# Patient Record
Sex: MALE | Race: WHITE
[De-identification: names, ages, dates, MRNs, and addresses within clinical notes are randomized per-mention and may not be internally consistent; named-entity substitution may affect disease eponyms.]

---

## 2018-04-25 LAB
ANION GAP SERPL CALC-SCNC: 12.7 MMOL/L (ref 8–16)
BUN SERPL-MCNC: 8 MG/DL (ref 7–26)
BUN/CREAT SERPL: 9 (ref 6–25)
CALCIUM SERPL-MCNC: 9.6 MG/DL (ref 8.4–10.2)
CHLORIDE SERPL-SCNC: 102 MMOL/L (ref 98–107)
CO2 SERPL-SCNC: 29 MMOL/L (ref 22–29)
EGFRCR SERPLBLD CKD-EPI 2021: > 60 ML/MIN (ref 60–?)
GLUCOSE SERPLBLD-MCNC: 248 MG/DL (ref 74–118)
POTASSIUM SERPL-SCNC: 4.7 MMOL/L (ref 3.5–5.1)
SODIUM SERPL-SCNC: 139 MMOL/L (ref 136–145)

## 2018-04-26 ENCOUNTER — HOSPITAL ENCOUNTER (OUTPATIENT)
Dept: HOSPITAL 88 - OR | Age: 48
Discharge: HOME | End: 2018-04-26
Attending: SPECIALIST
Payer: COMMERCIAL

## 2018-04-26 DIAGNOSIS — F32.9: ICD-10-CM

## 2018-04-26 DIAGNOSIS — B19.10: ICD-10-CM

## 2018-04-26 DIAGNOSIS — S46.021A: Primary | ICD-10-CM

## 2018-04-26 DIAGNOSIS — M75.21: ICD-10-CM
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
## 2018-04-26 DIAGNOSIS — I10: ICD-10-CM

## 2018-04-26 DIAGNOSIS — Z79.4: ICD-10-CM

## 2018-04-26 DIAGNOSIS — M54.9: ICD-10-CM

## 2018-04-26 DIAGNOSIS — Z01.812: ICD-10-CM

## 2018-04-26 DIAGNOSIS — I49.9: ICD-10-CM

## 2018-04-26 DIAGNOSIS — Z01.810: ICD-10-CM

## 2018-04-26 DIAGNOSIS — N20.0: ICD-10-CM
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
## 2018-04-26 DIAGNOSIS — E11.9: ICD-10-CM

## 2018-04-26 DIAGNOSIS — F41.9: ICD-10-CM

## 2018-04-26 PROCEDURE — 0LQ14ZZ REPAIR RIGHT SHOULDER TENDON, PERCUTANEOUS ENDOSCOPIC APPROACH: ICD-10-PCS | Performed by: SPECIALIST

## 2018-04-26 PROCEDURE — 29827 SHO ARTHRS SRG RT8TR CUF RPR: CPT

## 2018-04-26 PROCEDURE — 93005 ELECTROCARDIOGRAM TRACING: CPT

## 2018-04-26 PROCEDURE — 80048 BASIC METABOLIC PNL TOTAL CA: CPT

## 2018-04-26 PROCEDURE — 36415 COLL VENOUS BLD VENIPUNCTURE: CPT

## 2018-04-26 PROCEDURE — 82948 REAGENT STRIP/BLOOD GLUCOSE: CPT

## 2018-04-26 NOTE — OPERATIVE REPORT
DATE OF PROCEDURE:  April 26, 2018 



ASSISTANT:   Montez Bailey PA-C



The patient was brought to the operating room for induction of anesthesia.  

Throughout this case, my PA's assistance was necessary for retraction of 

soft tissue and positioning of the extremity.  This allows for efficient 

and technically successful execution of the operation and is considered 

medically necessary.



PREOPERATIVE DIAGNOSIS:  Right shoulder rotator cuff tear with biceps 

tendinitis.



PROCEDURE:  Right shoulder arthroscopy subacromial decompression 

compression, release and debridement of biceps tendon, rotator cuff repair.



INDICATIONS:  The patient is a 48-year-old gentleman with a near 30 year 

history of right shoulder pain.  He attributes this to an injury in high 

school  Clinic exam and MRI findings are consistent with a biceps 

tendonitis and a rotator cuff tear.  The findings and options have been 

discussed.  The patient's past history is significant for multiple back and 

neck surgeries and he is on chronic high-dose opioids and is on disability. 

 He understands the added challenges this will present in his postoperative 

recovery and pain management.  He states he understands and wishes to 

proceed.



DESCRIPTION OF PROCEDURE:  The patient was brought to the operating room 

and placed under general anesthetic.  He received a regional block and 

prophylactic antibiotics in the holding area.  He was positioned in the 

beach chair position on the shoulder table.  His right upper extremity was 

prepped and draped in a sterile manner.  A preoperative time out was 

performed.  The shoulder was noted to have full range of motion while 

prepping.  A standard arthroscopy portal was established in the posterior 

soft spot.  The shoulder was insufflated with sterile saline and 

systematically inspected.  The glenohumeral surfaces and labrum were well 

preserved.  The biceps tendon was torn at about 95%.  There was minimal 

fibers exiting the shoulder joint.  This was completely released and 

debrided back down to a stable rim at the supraglenoid tubercle.  The 

supraspinatus and infraspinatus were noted to be completely torn.  The 

scope was placed into the subacromial space.  A lateral working portal had 

been established after needle localization.  A subacromial bursectomy was 

performed with a 4.5 mm electro blade.  A subacromial bone decompression 

was also performed.  This opened up the visibility of the subacromial space 

to allow debridement of the rotator cuff back to more healthy tissue.  The 

greater tuberosity was gently decorticated.  An Arthrex double row speed 

bridge construct was used to repair the tendon down to bleeding cancellous 

bone.  Two bioabsorbable suture anchors pre-loaded with fiber tape were 

placed into a  holes at the articular margin.  There was approximately 

a 2 cm gap between the two suture anchors in the anterior to posterior 

plane.  These were passed through healthy portions of the tendon using an 

Arthrex Scorpion suture passer.  An anterior shuttle portal was 

established.  The fiber tapes were then used to repair the tendon down to 

bleeding cancellous bone using an anterior to posterior and posterior to 

anterior double row construct.  Nice secure fixation was obtained.  The 

arthroscopic instruments were removed.  The portal incisions were closed 

with nylon stitches.  A sterile bandage and an ultra sling was applied.  

The patient was extubated and transported to the recovery room in stable 

condition.



  



DD:  04/26/2018 12:01

DT:  04/26/2018 13:57

Job#:  [id number]

## 2018-05-10 ENCOUNTER — HOSPITAL ENCOUNTER (OUTPATIENT)
Dept: HOSPITAL 88 - RAD | Age: 48
End: 2018-05-10
Attending: UROLOGY
Payer: COMMERCIAL

## 2018-05-10 DIAGNOSIS — N20.0: Primary | ICD-10-CM

## 2018-05-10 PROCEDURE — 74018 RADEX ABDOMEN 1 VIEW: CPT

## 2018-05-10 NOTE — DIAGNOSTIC IMAGING REPORT
PROCEDURE:X-RAY ABDOMEN - KUB

 

COMPARISON:None.

 

INDICATIONS:CALCULUS OF KIDNEY

 

FINDINGS:

There is a non-obstructed bowel-gas pattern. Moderate amount of stool. 

Left upper quadrant surgical clips and bowel sutures. Several punctate 

radiodensity overlying the left renal shadow but adjacent to these 

surgical clips. There are no acute osseous abnormalities. The lung 

bases are clear.

 

CONCLUSION:

Several punctate radiodensity overlying the left renal shadow. However, 

this is adjacent to the surgical clips. This is indeterminate and may 

represent renal stones versus prior postsurgical changes. 

 

Dictated by:  Fawad Heath M.D. on 5/10/2018 at 12:59     

Electronically approved by:  Fawad Heath M.D. on 5/10/2018 at 12:59

## 2018-09-14 ENCOUNTER — HOSPITAL ENCOUNTER (OUTPATIENT)
Dept: HOSPITAL 88 - RAD | Age: 48
End: 2018-09-14
Attending: UROLOGY
Payer: COMMERCIAL

## 2018-09-14 DIAGNOSIS — N20.0: Primary | ICD-10-CM

## 2018-09-14 PROCEDURE — 74018 RADEX ABDOMEN 1 VIEW: CPT

## 2018-09-14 NOTE — DIAGNOSTIC IMAGING REPORT
EXAM: Abdomen 1  Views

INDICATION:     

\S\RENAL CALCULUS

COMPARISON: None



FINDINGS:

The diaphragms and more superior aspect of the abdomen are not included in the

exam.

Mild to moderate amount of stool in the colon. Air-filled loops of bowel

overlying the kidneys.

No dilated loops of small bowel.



No renal calculi. Few tiny radiopacities overlying the right upper quadrant may

represent an artifact.



No abnormal soft tissue masses.



Mild degenerative changes in the lumbar spine and pelvis.



IMPRESSION:

1.  Suboptimal evaluation of the abdomen and pelvis due to overlying bowel gas

and stools.

2.  No abnormal calcifications overlying the visualized renal shadows and

pelvis. If clinical concern for renal stone, consider CT abdomen and pelvis

without contrast renal stone protocol.



Signed by: Dr. Anitra Schmidt M.D. on 9/14/2018 2:30 PM

## 2020-05-16 ENCOUNTER — HOSPITAL ENCOUNTER (EMERGENCY)
Dept: HOSPITAL 88 - ER | Age: 50
LOS: 1 days | Discharge: HOME | End: 2020-05-17
Payer: COMMERCIAL

## 2020-05-16 VITALS — HEIGHT: 71 IN | WEIGHT: 205 LBS | BODY MASS INDEX: 28.7 KG/M2

## 2020-05-16 DIAGNOSIS — Z98.84: ICD-10-CM

## 2020-05-16 DIAGNOSIS — E11.65: ICD-10-CM

## 2020-05-16 DIAGNOSIS — E11.40: ICD-10-CM

## 2020-05-16 DIAGNOSIS — I95.2: Primary | ICD-10-CM

## 2020-05-16 LAB
BASOPHILS # BLD AUTO: 0 10*3/UL (ref 0–0.1)
BASOPHILS NFR BLD AUTO: 0.6 % (ref 0–1)
DEPRECATED NEUTROPHILS # BLD AUTO: 2.3 10*3/UL (ref 2.1–6.9)
EOSINOPHIL # BLD AUTO: 0.1 10*3/UL (ref 0–0.4)
EOSINOPHIL NFR BLD AUTO: 2.7 % (ref 0–6)
ERYTHROCYTE [DISTWIDTH] IN CORD BLOOD: 14.2 % (ref 11.7–14.4)
HCT VFR BLD AUTO: 34.5 % (ref 38.2–49.6)
HGB BLD-MCNC: 11.1 G/DL (ref 14–18)
LYMPHOCYTES # BLD: 2.4 10*3/UL (ref 1–3.2)
LYMPHOCYTES NFR BLD AUTO: 45.2 % (ref 18–39.1)
MCH RBC QN AUTO: 27 PG (ref 28–32)
MCHC RBC AUTO-ENTMCNC: 32.2 G/DL (ref 31–35)
MCV RBC AUTO: 83.9 FL (ref 81–99)
MONOCYTES # BLD AUTO: 0.4 10*3/UL (ref 0.2–0.8)
MONOCYTES NFR BLD AUTO: 6.7 % (ref 4.4–11.3)
NEUTS SEG NFR BLD AUTO: 44.6 % (ref 38.7–80)
PLATELET # BLD AUTO: 192 X10E3/UL (ref 140–360)
RBC # BLD AUTO: 4.11 X10E6/UL (ref 4.3–5.7)

## 2020-05-16 PROCEDURE — 81001 URINALYSIS AUTO W/SCOPE: CPT

## 2020-05-16 PROCEDURE — 99283 EMERGENCY DEPT VISIT LOW MDM: CPT

## 2020-05-16 PROCEDURE — 80307 DRUG TEST PRSMV CHEM ANLYZR: CPT

## 2020-05-16 PROCEDURE — 80053 COMPREHEN METABOLIC PANEL: CPT

## 2020-05-16 PROCEDURE — 85025 COMPLETE CBC W/AUTO DIFF WBC: CPT

## 2020-05-16 PROCEDURE — 82553 CREATINE MB FRACTION: CPT

## 2020-05-16 PROCEDURE — 93005 ELECTROCARDIOGRAM TRACING: CPT

## 2020-05-16 PROCEDURE — 84484 ASSAY OF TROPONIN QUANT: CPT

## 2020-05-16 PROCEDURE — 82550 ASSAY OF CK (CPK): CPT

## 2020-05-16 PROCEDURE — 36415 COLL VENOUS BLD VENIPUNCTURE: CPT

## 2020-05-16 PROCEDURE — 71045 X-RAY EXAM CHEST 1 VIEW: CPT

## 2020-05-16 NOTE — XMS REPORT
Continuity of Care Document

                             Created on: 2020



SHANNA CRUMP JR

External Reference #: [id number]

: 1970

Sex: Male



Demographics





                          Address                   [address] [place] [address] [id number]

[place]  [address]

 

                          Home Phone                [phone]

 

                          Preferred Language        English

 

                          Marital Status            Unknown

 

                          Protestant Affiliation     Unknown

 

                          Race                      Unknown

 

                          Ethnic Group              Unknown





Author





                          Author                    Diane Watters [employer]

 SHANNA Mcclain              [employer]

 Exchange

 

                          Address                   Unknown

 

                          Phone                     Unavailable







Care Team Providers





                    Care Team Member Name Role                Phone

 

                    [facility] [employer] Unavailable         Un

available



                                    



Problems

                    



                    Problem                         Status                      

   Onset Date       

                          Classification                         Date Reported  

         

                          Comments                         Source               

     

 

                          STENOSIS, SPONYLOSIS, DISC DEGENERATION               

          Active          

                    2017                                                  

      

                                                    [facility]     

         

     

 

                          LUM STENOSIS, LUM SPONDYLOSIS, LUM DISC               

          Active          

                    2015                                                  

      

                                                    [facility]     

         

     

 

                    Anxiety (finding)                         Active            

                    

                          Problem                         06/15/2019            

         

                                                    [facility]

[facility] [place]            

       

 

                    Backache (finding)                         Active           

                    

                          Problem                         06/15/2019            

        

                                                    [facility]

[facility] [place]           

        

 

                          Diabetes mellitus (disorder)                         A

ctive                     

                                             Problem                         06/

15/2019          

                                                    [facility]

[facility] [place] 

                  

 

                          Depressive disorder (disorder)                        

 Active                   

                                             Problem                         06/

15/2019        

                                                    [facility]

PID 

[place]                    

 

                          Obstructive sleep apnea syndrome (disorder)           

              Active      

                                             Problem                         

06/15/2019                                                   [facility] [place]                    

 

                    Pain (finding)                         Active               

                    

                          Problem                         06/15/2019            

            

                                                    [facility]

[facility] [place]               

    

 

                          Spinal stenosis in cervical region (disorder)         

                Active    

                                             Problem                         

06/15/2019                                                   [facility] [place]                    

 

                    Final:                                                      

                    

                                             2015                         

                    

                                        [facility]                 

   

 

                    SPIN STEN,LUMBR W YAAKOV                         Active      

                    

                                                                                

                                        [facility]                 

   

 

                    LUMBOSACRAL SPONDYLOSIS                         Active      

                    

                                                                                

                                        [facility]                 

   



                                                                                
                                                                                
                                                                                
     



Medications

                    



                    Medication                         Details                  

       Route        

                          Status                         Patient Instructions   

          

                          Ordering Provider                         Order Date  

               

                                        Source                    

 

                          Lisinopril                         Notes: (Same as: Pr

inivil, Zestril)          

                                             No Longer Active                   

      

                                                    2017                  

   

                                        [facility]                 

   

 

                          lisinopril 20 mg oral tablet                         2

0 mg = 1 tab, PO, Daily, 0

Refill(s)                                                   Active              

 

                                                                      2017

           

                                        [facility]                 

   

 

                                        pneumococcal capsular polysaccharide typ

e 1 vaccine / pneumococcal capsular 

polysaccharide type 10A vaccine / pneumococcal capsular polysaccharide type 11A 
vaccine / pneumococcal capsular polysaccharide type 12F vaccine / pneumococcal 
capsular polysacchar                         Notes: (Same as: Pneumovax 23)  

Refrigerate                                                   Inactive          

 

                                                                      2017

       

                                        [facility]                 

   

 

                          Magnesium Oxide                         Notes: (Same a

s: Mag-Ox 400) Magnesium 

oxide [id number] elemental magnesium Dose=____mg magnesium oxide (___mg 
elemental magnesium)                                                   No Longer

 

Active                                                                          

 

                          2017                         [facility]

nter                    

 

                                        NPH Insulin, Human 70 UNT/ML / Regular I

nsulin, Human 30 UNT/ML Injectable 

Suspension                              Notes: Roll in palms of hands gently; Do

 not 

shake. (Same as: NovoLIN Mix 70/30) Do not hold insulin without contacting 
prescriber  WASTE: F/P - Black; E - Municipal Trash Bin                         

                          No Longer Active                                      

  

                                             2017                         

[facility]                    

 

                          Cefazolin                         Notes: (Same As: Anc

ef, Kefzol)    *** 

MEDICATION WASTE *** Product Size:  1000 mg Product Wasted:  ___ mg             
                                             No Longer Active                   

         

                                                    2017                  

      

                                        [facility]                 

   

 

                          Insulin regular                           60 units)  W

ASTE: F/P - Black; E - 

Municipal Trash Bin  Stable for 28 days at room temperature Expires in ______ 
days from ______________Date                                                   N

o

Longer Active                                                                   

 

                          2017                         [facility]

nter               

    

 

                          Glucagon                         1 mg, Route: IM, Drug

 form: PDR/INJ, PRN, 

Dosing Weight 97.727, kg, PRN Blood Glucose Results, Start date: 17 
16:02:00 CDT, Duration: 30 day, Stop date: 17 16:01:00 CDT                
                                             No Longer Active                   

            

                                             2017                         

[facility]                    

 

                          Dextrose 50% Syringe                         25 gm, 50

 mL, Route: IVP, Drug 

Form: INJ, Dosing Weight 97.727, kg, PRN, PRN Blood Glucose Results, Start date:
17 16:02:00 CDT, Duration: 30 day, Stop date: 17 16:01:00 CDT       
                                                    No Longer Active            

          

                                                                      2017

                  

                                        [facility]                 

   

 

                          gabapentin                         Notes: (Same as: Ne

urontin)                  

                                             No Longer Active                   

              

                                             2017                         

[facility]                    

 

                          Acetaminophen                         Notes: Max aceta

minophen 4000 mg/day (4 

gm/day).  (Same as: Tylenol Extra Strength)                                     

                    No Longer Active                                            

        

                          2017                         [facility]

nter

                   

 

                          Oxycontin                         Notes: Do not crush 

or chew. (Same as: 

OxyContin)                                                   No Longer Active   

 

                                                                      2017

                                        [facility]                 

   

 

                          Oxycodone Hydrochloride 5 MG Oral Tablet              

           Notes: (Same 

as: Roxicodone)                                                   No Longer 

Active                                                                          

 

                          2017                         [facility]

nt                    

 

                          ketOROLAC 30 mg/mL injectable solution                

          4 days.         

                                                    No Longer Active            

            

                                                                      2017

                    

                                        [facility]                 

   

 

                                        24 HR tramadol hydrochloride 100 MG Exte

nded Release Tablet                     

                          Notes: Not to exceed 400mg/day. (Same As: Ultram)     

                       

                     Inactive                                                   

                          2017                         [facility]                    

 

                          tramadol hydrochloride 50 MG Oral Tablet              

           50 mg, Route: 

PO, Drug form: TAB, Q6H, Dosing Weight 97.727, kg, PRN Pain Score 1-3, Start 
date: 17 11:01:00 CDT, Duration: 30 day, Stop date: 17 11:00:00 CDT 
                                                    Inactive                    

    

                                                                      2017

                    

                                        [facility]                 

   

 

                          Hydromorphone                         Notes: Same as: 

Dilaudid                  

                                             Inactive                           

              

                                             2017                         

[facility]                    

 

                          Oxycodone                         Notes: (Same as: Winifred

icodone)                  

                                             Inactive                           

              

                                             2017                         

[facility]                    

 

                          Ondansetron                         Notes: (Same as: JEFE berry)   *** MEDICATION 

WASTE *** Product Size:  4 mg Product Wasted:  ___ mg                           

                      Inactive                                                  

                          2017                         [facility]                    

 

                          neostigmine (ANES)                         Route: IV, 

Drug form: INJ, ONCE, Stop

date: 17 10:53:00 CDT                                                   

Inactive                                                                        

 

                          2017                         [facility]

nter                    

 

                          glycopyrrolate (ANES)                         Route: I

V, Drug form: INJ, ONCE, 

Stop date: 17 10:53:00 CDT                                                

                    Inactive                                                    

                   

                          2017                         [facility]

nter                   



 

                          ondansetron (ANES)                         Route: IV, 

Drug form: INJ, ONCE, Stop

date: 17 10:53:00 CDT                                                   

Inactive                                                                        

 

                          2017                         [facility]

nter                    

 

                    ketOROLAC (ANES)                         IV, ONCE           

                    

                    Inactive                                                    

  

                          2017                         [facility]

nter  

                 

 

                          LR 1000 mL INJ (ANES)                         Route: I

V, Total Volume: 1,000, 

Start date: 17 10:08:00 CDT, Stop date: 17 11:08:00 CDT             
                                             Inactive                           

         

                                             2017                         

[facility]                    

 

                          phenylephrine (ANES)                         Route: IV

, Drug form: INJ, ONCE, 

Stop date: 17 10:07:00 CDT                                                

                    Inactive                                                    

                   

                          2017                         [facility]

nter                   



 

                          dexamethasone (ANES)                         Route: IV

, Drug form: INJ, ONCE, 

Stop date: 17 9:57:00 CDT                                                 

                    Inactive                                                    

                    

                          2017                         [facility]

nter                    

 

                          rocuronium (ANES)                         Route: IV, D

rug form: INJ, ONCE, Stop 

date: 17 9:42:00 CDT                                                   

Inactive                                                                        

 

                          2017                         [facility]

nter                    

 

                          lidocaine (ANES)                         Route: IV, Dr

ug form: INJ, ONCE, Stop 

date: 17 9:42:00 CDT                                                   

Inactive                                                                        

 

                          2017                         [facility]

nter                    

 

                          propofol (ANES)                         Route: IV, Matty

g form: INJ, ONCE, Stop 

date: 17 9:42:00 CDT                                                   

Inactive                                                                        

 

                          2017                         [facility]

nt                    

 

                          fentaNYL (ANES)                         Route: IV, Matty

g form: INJ, ONCE, Stop 

date: 17 9:42:00 CDT                                                   

Inactive                                                                        

 

                          2017                         [facility]

nt                    

 

                          ceFAZolin (ANES)                         Route: IV, Dr

ug form: INJ, ONCE, Stop 

date: 17 9:42:00 CDT                                                   

Inactive                                                                        

 

                          2017                         [facility]

nter                    

 

                          midazolam (ANES)                         Route: IV, Dr

ug form: SOLN, ONCE, Stop 

date: 17 9:37:00 CDT                                                   

Inactive                                                                        

 

                          2017                         [facility]

nter                    

 

                          Protonix                         Notes: For IV push re

constitute with 10 ml 0.9%

sodium chloride and push over 2 minutes. (Same as: Protonix)                    
                                             No Longer Active                   

                

                                             2017                         

[facility]                    

 

                          SUFentanil (ANES) (ANES)                         Route

: IV, Drug form: INJ, 

Start date: 17 9:00:00 CDT, Stop date: 17 10:00:00 CDT              
                                             Inactive                           

          

                                             2017                         

[facility]                    

 

                                        sodium chloride 0.9% 100 ml INJ (ANES) +

 ketAMINE (ANES) (ANES)                 

                                        Route: IV, Drug form: INJ, Start date: 17 9:00:00 CDT, Stop date: 

17 10:00:00 CDT                                                   Inactive

 

                                                                         

2017                              [facility]                 

   

 

                                        sodium chloride 0.9% 100 ml INJ (ANES) +

 magnesium sulfate (ANES) (ANES)        

                                        Route: IV, Drug form: INJ, Start date: 17 9:00:00 CDT, 

Stop date: 17 10:00:00 CDT                                                

                    Inactive                                                    

                   

                          2017                         [facility]

nter                   



 

                                        sodium chloride 0.9% 100 ml INJ (ANES) +

 lidocaine (ANES) (ANES)                

                                        Route: IV, Drug form: INJ, Start date: 17 9:00:00 CDT, Stop date: 

17 10:00:00 CDT                                                   Inactive

 

                                                                         

2017                              [facility]                 

   

 

                          Oxycontin                         40 mg, 2 tab, Route:

 PO, Drug form: ERTAB, 

Q12H, Dosing Weight 97.727, kg, Start date: 17 9:00:00 CDT, Duration: 30 
day, Stop date: 17 21:00:00 CDT                                           

                    Inactive                                                    

              

                          2017                         [facility]

nter              

     

 

                          Dexamethasone                         Notes: Concentra

tion: 4mg/ml              

                                             No Longer Active                   

          

                                             2017                         

[facility]                    

 

                          Docusate Sodium 100 MG Oral Capsule [Colace]          

               Notes: 

(Same as: Colace) (Do Not Crush)                                                

                    No Longer Active                                            

                  

                          2017                         [facility]

nter         

           

 

                          Metformin hydrochloride 500 MG Oral Tablet            

             Notes: (Same 

as: Glucophage)  Take with meal                                                 

                    No Longer Active                                            

                   

                          2017                         [facility]

nter          

          

 

                          ReliOn/NovoLIN 70/30                         30 unit, 

Route: SUB-Q, Daily, 

Dosing Weight 97.727, kg, Start date: 17 9:00:00 CDT, Duration: 30 day, 
Stop date: 17 9:00:00 CDT                                                 

                    Inactive                                                    

                   

                          2017                         [facility]

nter                  

  

 

                          gabapentin 600 MG Oral Tablet                         

Notes: (Same as: 

Neurontin)                                                   Inactive           

 

                                                                      2017

       

                                        [facility]                 

   

 

                          Escitalopram                         Notes: (Same as: 

Lexapro)                  

                                             No Longer Active                   

             

                                             2017                         

[facility]                    

 

                          Zofran                         Notes: (Same as: Zofran

)   *** MEDICATION WASTE 

*** Product Size:  4 mg Product Wasted:  ___ mg                                 

                          No Longer Active                                      

         

                                             2017                         

[facility]                    

 

                          tramadol hydrochloride 50 MG Oral Tablet              

           Notes: Not to 

exceed 400mg/day. (Same As: Ultram)                                             

                    No Longer Active                                            

               

                          2017                         [facility] [place] Ce

nter      

              

 

                          tizanidine                         Notes: (Same As: Za

naflex)                   

                                             No Longer Active                   

              

                                             2017                         

[facility]                    

 

                          sennosides, USP                         Notes: (Same a

s: Senokot)               

                                             No Longer Active                   

          

                                                    2017                  

      

                                        [facility]                 

   

 

                          Phenergan                         Notes: Do not give I

V push.  (Same as: 

Phenergan)                                                   No Longer Active   

 

                                                                       

2017                              [facility]                 

   

 

                          sodium chloride 0.9% 1000 ml INJ 1,000 mL             

            1,000 mL, 

Rate: 75 ml/hr, Infuse over: 13.3 hr, Route: IV, Dosing Weight 97.727 kg, Total 
Volume: 1,000, Start date: 17 8:42:00 CDT, Duration: 30 day, Stop date: 
17 8:41:00 CDT                                                   No Longer

 

Active                                                                          

 

                          2017                         [facility] [place] [place] Ce

nter                    

 

                          Morphine                         Notes: (Same as:MORPh

ine Sulfate)              

                                             Inactive                           

         

                                             2017                         

[facility]                    

 

                          Diphenhydramine                         Notes: (Same a

s: Benadryl)              

                                             No Longer Active                   

         

                                                    2017                  

     

                                        [facility]                 

   

 

                          cyclobenzaprine                         Notes: (Same A

s: Flexeril)              

                                             Inactive                           

         

                                             2017                         

[facility]                    

 

                                        Benzocaine 15 MG / Menthol 3.6 MG Lozeng

e [Cepacol Sore Throat Pain Relief 

15/3.6]                                 Notes: Cepacol lozenges  Dispense 1 box 

= 16 

lozenges  (Same As: Cepacol Lozenges)                                           

                    No Longer Active                                            

              

                          2017                         [facility] [place] Ce

nter      

             

 

                    Ambien                         Notes: (Same As: Ambien)     

                    

                          No Longer Active                                      

  

                                             2017                         

[facility]                    

 

                          Lorazepam                         Notes: (Same as: Christopher villegas)                      

                                             No Longer Active                   

                  

                                             2017                         

[facility]                    

 

                          ReliOn/NovoLIN R                         10 unit, Rout

e: SUB-Q, Drug form: SOLN,

PRN, Dosing Weight 97.727, kg, PRN Blood Glucose Results, Start date: 17 
8:39:00 CDT, Duration: 30 day, Stop date: 17 8:38:00 CDT                  
                                             Inactive                           

              

                                             2017                         

[facility]                    

 

                          Insulin regular                         6 unit, Route:

 SUB-Q, ONCE, Dosing 

Weight 97.727, kg, Start date: 17 8:01:00 CDT, Stop date: 17 8:01:00
CDT                                                   Inactive                  

 

                                                                      2017

               

                                        [facility]                 

   

 

                                        Acetaminophen 325 MG / Hydrocodone Yesenia

trate 10 MG Oral Tablet [Norco 10/325]  

                                        Notes: Do not exceed 4gm/day of acetamin

ophen.  (Same as: 

Norco 325/10)                                                   Inactive        

 

                                                                      2017

     

                                        [facility]                 

   

 

                          gabapentin 300 MG Oral Capsule                        

 Notes: (Same as: 

Neurontin)                                                   Inactive           

 

                                                                      2017

        

                                        [facility]                 

   

 

                          tizanidine 4 mg oral capsule                         4

 mg = 1 cap, PO, TID, As 

needed                                                   Active                 

 

                                                                      2017

              

                                        [facility]                 

   

 

                          Metformin hydrochloride 500 MG Oral Tablet            

             500 mg = 1 

tab, PO, BID                                                   Active           

 

                                                                      2017

        

                                        [facility]                 

   

 

                          gabapentin 600 MG Oral Tablet                         

600 mg = 1 tab, PO, BID   

                                              Active                            

                                                    2017                  

      

                                        [facility]                 

   

 

                          Lorazepam 1 MG Oral Tablet                         1 m

g = 1 tab, PO, PRN        

                                             Active                             

    

                                             2017                         

[facility]                    

 

                                        12 HR Oxycodone Hydrochloride 40 MG Exte

nded Release Tablet [Oxycontin]         

                          40 mg = 1 tab, PO, Q12H                               

          

                    Active                                                      

           

                          2017                         [facility] Ce

nter            

        

 

                          Diclofenac Sodium 0.01 MG/MG Topical Gel              

           TOP            

                                             No Longer Active                   

       

                                                    2017                  

   

                                        [facility]                 

   

 

                                        Regular Insulin, Human 100 UNT/ML Inject

able Solution [Novolin R]               

                    10 unit, SUB-Q, PRN                                         

         No

Longer Active                                                                   

 

                          2017                         [facility] Ce

nter               

    

 

                          ReliOn/NovoLIN 70/30                         30 unit, 

SUB-Q, Daily              

                                             No Longer Active                   

          

                                             2017                         

[facility]                    

 

                          escitalopram 20 mg oral tablet                        

 20 mg = 1 tab, PO, Daily 

                                                Active                          

                                                    2017                  

    

                                        [facility]                 

   

 

                          Lidocaine Hydrochloride 0.03 MG/MG Topical Gel        

                 1 appl, 

TOP                                                   No Longer Active          

 

                                                                      2017

       

                                        [facility]                 

   

 

                          Oxycontin                         Notes: (Same as: Oxy

Contin)                   

                                             No Longer Active                   

               

                                             2015                         

[facility]                    

 

                          Dexamethasone                         Notes: Concentra

tion: 4mg/ml              

                                             No Longer Active                   

          

                                             2015                         

[facility]                    

 

                          Vitamin B-12 500 mcg oral tablet                      

   500 microgram = 1 tab, 

PO, Daily                                                   Active              

 

                                                                      2015

           

                                        [facility]                 

   

 

                          Calcium 600 +D oral tablet                         1 t

ab, PO, Daily             

                                             Active                             

         

                                             2015                         

[facility]                    

 

                    multivitamin                         1 tab, PO, Daily       

                    

                      Active                                                    

                          2015                         [facility] [place] [place] Ce

nter

                   

 

                          ferrous sulfate 325 MG Oral Tablet                    

     325 mg = 1 tab, PO, 

Daily                                                   Active                  

 

                                                                      2015

               

                                        [facility]                 

   

 

                          lisinopril 2.5 mg oral tablet                         

2.5 mg = 1 tab, PO, Daily,

# 30 tab                                                   Active               

 

                                                                      2015

            

                                        [facility]                 

   

 

                          NovoLIN 70/30                         20 - 30 units, S

UB-Q, TID-Before Meals    

                                             Active                             

                                             2015                         

[facility]                    

 

                          ceFAZolin (SCIP) + Sodium Chloride 0.9%  mL     

                    Notes:

(Same As: Ancef, Kefzol)  Cefazolin **FOR IV SET ONLY**   *** MEDICATION WASTE 
*** Product Size:  1000 mg Product Wasted:  ___ mg                              

                          No Longer Active                                      

       

                                             2015                         

[facility]                    

 

                          Ofirmev                         Notes: Infuse over 15 

minutes  Do not exceed 

4gm/day of acetaminophen    *** MEDICATION WASTE *** Product Size:  1000 mg 
Product Wasted:  _0__ mg                                                   No 

Longer Active                                                                   

 

                          2015                         [facility]

nter               

    

 

                          ketOROLAC 30 mg/mL injectable solution                

          4 days   *** 

MEDICATION WASTE *** Product Size:  30 mg Product Wasted:  __0_ mg              
                                             No Longer Active                   

          

                                             2015                         

[facility]                    

 

                          Docusate Sodium 100 MG Oral Capsule [Colace]          

               Notes: 

(Same as: Colace) (Do Not Crush)                                                

                    No Longer Active                                            

                  

                          2015                         [facility]

nter         

           

 

                          benzocaine-menthol topical                         Not

es: Cepacol lozenges  

Dispense 1 box = 16 lozenges  (Same As: Cepacol Lozenges)                       
                                             No Longer Active                   

                  

                                             2015                         

[facility]                    

 

                          Ondansetron                         Notes: (Same as: JEFE berry)   *** MEDICATION 

WASTE *** Product Size:  4 mg Product Wasted:  ___ mg                           

                          Inactive                                              

   

                                             2015                         

[facility]                    

 

                          Naloxone                         Notes: (Same as: Narc

an)                       

                                             Inactive                           

                  

                                             2015                         

[facility]                    

 

                          Flumazenil                         Notes: (Same as: Ro

mazicon)                  

                                             Inactive                           

             

                                             2015                         

[facility]                    

 

                          Meperidine                         Notes: (Same as: Kevin thompson)  "Use Precaution in

 Elderly, Seizure disorders, and Renal impairment"                              

                    Inactive                                                    

                          2015                         [facility]                    

 

                          Hydromorphone                         Notes: Same as: 

Dilaudid                  

                                             Inactive                           

             

                                             2015                         

[facility]                    

 

                          Labetalol                         10 mg, 2 mL, Route: 

IVP, Drug form: INJ, 

Q5Min, Dosing Weight 88.636, kg, PRN Elevated BP, Start date: 04/16/15 13:56:00,
 Duration: 5 doses or times, Stop date: Limited # of times                      
                                             Inactive                           

                 

                                             2015                         

[facility]                    

 

                          Insulin regular                           60 units)  S

table for 28 days at room 

temperature Expires in ______ days from ______________Date                      
                                             No Longer Active                   

                 

                                             2015                         

[facility]                    

 

                          Dextrose 50% Syringe                         12.5 gm, 

25 mL, Route: IVP, Drug 

Form: INJ, Dosing Weight 88.636, kg, PRN, PRN Blood Glucose Results, Start date:
 04/16/15 13:20:00, Duration: 30 day, Stop date: 05/16/15 13:19:00              
                                             No Longer Active                   

         

                                                    2015                  

     

                                        [facility]                 

   

 

                          Glucagon                         1 mg, Route: IM, Drug

 form: PDR/INJ, PRN, 

Dosing Weight 88.636, kg, PRN Blood Glucose Results, Start date: 04/16/15 
13:20:00, Duration: 30 day, Stop date: 05/16/15 13:19:00                        
                                             No Longer Active                   

                   

                                             2015                         

[facility]                    

 

                          Dilaudid                         Notes: Same as: Dilau

did                       

                                             No Longer Active                   

                  

                                             2015                         

[facility]                    

 

                          Dilaudid                         Notes: Same as: Dilau

did                       

                                             No Longer Active                   

                  

                                             2015                         

[facility]                    

 

                          Oxycodone Hydrochloride 5 MG Oral Tablet              

           Notes: (Same 

as: Roxicodone)                                                   No Longer 

Active                                                                          

 

                          2015                         [facility]

nter                    

 

                          Zofran                         Notes: (Same as: Zofran

)   *** MEDICATION WASTE 

*** Product Size:  4 mg Product Wasted:  ___ mg                                 

                          No Longer Active                                      

         

                                             2015                         

[facility]                    

 

                          normal saline 0.9% IV 1,000 mL                        

 1,000 mL, Rate: 75 ml/hr,

 Infuse over: 13.3 hr, Route: IV, Dosing Weight 88.636 kg, Total Volume: 1,000, 
Start date: 04/16/15 13:15:00, Duration: 30 day, Stop date: 05/16/15 13:14:00   
                                                    No Longer Active            

     

                                                                      2015

            

                                        [facility]                 

   

 

                          Phenergan                         Notes: Do not give I

V push.  (Same as: 

Phenergan)                                                   No Longer Active   

 

                                                                       

2015                              [facility]                 

   

 

                    Ambien                         Notes: (Same As: Ambien)     

                    

                          No Longer Active                                      

 

                                             2015                         

[facility]                    

 

                          Cepacol Lozenge                         1 lozenge, Rou

te: MUCOUS MEM, Q2H, Drug 

form: ALEXIS, PRN Sore Throat, Start date: 04/16/15 13:15:00, Duration: 30 day, 
Stop date: 05/16/15 13:14:00                                                   

Inactive                                                                        

 

                          2015                         [facility]

nter                   

 

 

                          Ancef                         2 gm, Route: IVPB, Drug 

form: INJ, ONCE, Dosing 

Weight 88.636, kg, Start date: 04/16/15 13:12:00, Duration: 1 doses or times, 
Stop date: 04/16/15 13:12:00                                                   

Inactive                                                                        

 

                          2015                         [facility]

nter                   

 

 

                          ceFAZolin                         2 gm, 50 mL, Route: 

IVPB, Drug form: INJ, PRE 

OP, Start date: 04/16/15 7:00:00, Duration: 1 day, Stop date: 04/17/15 6:59:00  
                                                    No Longer Active            

    

                                                                      2015

           

                                        [facility]                 

   

 

                          NovoLIN 70/30                         SUB-Q, TID-Befor

e Meals, 0 Refill(s)      

                                                    No Longer Active            

        

                                                                      2015

               

                                        [facility]                 

   

 

                                        Acetaminophen 325 MG / Hydrocodone Yesenia

trate 10 MG Oral Tablet [Norco 10/325]  

                                        1-2 tab, PO, Q4-6H, PRN Pain, # 30 tab, 

0 Refill(s)       

                                             Active                             

   

                                             2015                         

[facility]                    

 

                          Flexeril                         5 mg, PO, TID, PRN Mu

scle Spasm, # 30 tab, 0 

Refill(s)                                                   Active              

 

                                                                      2015

           

                                        [facility]                 

   



                                                                                
                                                                                
                                                                                
                                                                                
                                                                                
                                                                                
                                                                                
                                                                                
                                                                                
                                                                                
                                                                                
                                                                                
                                                                                
                                                                                
                                                                                
                                                                                
                                                                                
                                                                                
                                                                                
                                                                                
                                                                                
                                               



Allergies, Adverse Reactions, Alerts

                    



                    Substance                         Category                  

       Reaction     

                          Severity                         Reaction type        

       

                    Status                         Date Reported                

         

Comments                                Source                    

 

                          No Known Medication Allergies                         

Assertion                 

                                                                      Drug aller

gy           

                                                                                

       

                                         [facility]                    



                                                        



Immunizations

                    



                    Immunization                         Date Given             

            Site    

                          Status                         Last Updated           

      

                          Comments                         Source               

     

 

                          pneumococcal 23-valent vaccine                        

 2017               

                    Left deltoid                         completed              

           

[place]                                                   [facility], [facility]                    



                                            



Results





                    Order Name                         Results                  

       Value        

                          Reference Range                         Date          

          

                    Interpretation                         Comments             

            

Source                    

 

                CHEM PANEL                         Calcium Lvl     6.0          

               8.5 -

10.5                         2017                                         

                                        Result Comment: Critical Result(s) cabral

d to Suzanneapril Ruth at 2017 

06:05 byJw.  Read back OK.                         [facility]      

             

 

                CHEM PANEL                         eGFR            137          

                           

                    2017                                                  

Result 

Comment: The eGFR is calculated using the CKD-EPI formula. In most young, 
healthy individuals the eGFR will be >90 mL/min/1.73m2. The eGFR declines with 
age. An eGFR of 60-89 may be normal in some populations, particularly the 
elderly, for whom the CKD-EPI formula has not been extensively validated. Use of
the eGFR is not recommended in the following populations:<br/><br/>Individuals 
with unstable creatinine concentrations, including pregnant patients and those 
with serious co-morbid conditions.<br/><br/>Patients with extremes in muscle 
mass or diet. <br/><br/>The data above are obtained from the National Kidney 
Disease Education Program (NKDEP) which additionally recommends that when the 
eGFR is used in patients with extremes of body mass index for purposes of drug 
dosing, the eGFR should be multiplied by the estimated BMI.                     
                                        [facility]                 

   

 

                CHEM PANEL                         BUN             12           

              7 - 22        

                    2017                                                  

    

                                        [facility]                 

   

 

                CHEM PANEL                         Glucose Lvl     205          

               70 - 

99                         2017                                           

                                                    [facility]     

               

 

                CHEM PANEL                         Creatinine Lvl  0.43         

                

0.50 - 1.40                         2017                                  

                                                    [facility]     

            

  

 

                CHEM PANEL                         Sodium Lvl      145          

               135 - 

145                         2017                                          

                                                    [facility]     

               

 

                CHEM PANEL                         Potassium Lvl   3.7          

               3.5

- 5.1                         2017                                        

                                                    [facility]     

               

 

                CHEM PANEL                         Chloride Lvl    118          

               95 -

109                         2017                                          

                                                    [facility]     

               

 

                CHEM PANEL                         CO2             21           

              24 - 32       

                    2017                                                  

   

                                        [facility]                 

   

 

                CHEM PANEL                         AGAP            9.7          

               10.0 - 20.0 

                          2017                                            

   

                                                    [facility]     

               

 

                HEMATOLOGY                         Hct             23.8         

                42.0 - 54.0 

                          2017                                            

   

                                                    [facility]     

               

 

                HEMATOLOGY                         Hgb             8.2          

               14.0 - 18.0  

                          2017                                            

    

                                                    [facility]     

               

 

                LIPIDS                         VLDL            17               

                           

                    2017                                                  

             

                                        [facility]                 

   

 

                LIPIDS                         HDL             35               

          >=61 mg/dL        

                    2017                                                  

    

                                        [facility]                 

   

 

                LIPIDS                         Trig            86               

          <=149 mg/dL      

                    2017                                                  

  

                                        [facility]                 

   

 

                LIPIDS                         Chol            118              

           <=199 mg/dL     

                    2017                                                  

 

                                        [facility]                 

   

 

                LIPIDS                         CHD Risk        3.37             

            4.00 - 7.30

                          2017                                            

  

                                                    [facility]     

               

 

                LIPIDS                         LDL (Calculated) 66              

           <=99 

mg/dL                         2017                                        

                                                    [facility]     

               

 

                CHEM PANEL                         Magnesium Lvl   2.1          

               1.8

- 2.4                         2017                                        

                                                    [facility]     

               

 

                ELECTROLYTES                         AGAP            10.1       

                  10.0 - 

20.0                         2017                                         

                                                    [facility]     

               

 

                ELECTROLYTES                         eGFR            109        

                           

                    2017                                                  

Result

Comment: The eGFR is calculated using the CKD-EPI formula. In most young, 
healthy individuals the eGFR will be >90 mL/min/1.73m2. The eGFR declines with 
age. An eGFR of 60-89 may be normal in some populations, particularly the 
elderly, for whom the CKD-EPI formula has not been extensively validated. Use of
the eGFR is not recommended in the following populations:<br/><br/>Individuals 
with unstable creatinine concentrations, including pregnant patients and those 
with serious co-morbid conditions.<br/><br/>Patients with extremes in muscle 
mass or diet. <br/><br/>The data above are obtained from the National Kidney 
Disease Education Program (NKDEP) which additionally recommends that when the 
eGFR is used in patients with extremes of body mass index for purposes of drug 
dosing, the eGFR should be multiplied by the estimated BMI.                     
                                        [facility]                 

   

 

                ELECTROLYTES                         Calcium Lvl     8.8        

                 8.5

- 10.5                         2017                                       

                                                    [facility]     

               

 

                ELECTROLYTES                         Glucose Lvl     95         

                70 -

99                         2017                                           

                                                    [facility]     

               

 

                ELECTROLYTES                         Potassium Lvl   4.1        

                 

3.5 - 5.1                         2017                                    

                                                    [facility]     

              



 

                ELECTROLYTES                         CO2             31         

                24 - 32     

                    2017                                                  

 

                                        [facility]                 

   

 

                ELECTROLYTES                         Chloride Lvl    101        

                 95

- 109                         2017                                        

                                                    [facility]     

               

 

                ELECTROLYTES                         BUN             13         

                7 - 22      

                    2017                                                  

  

                                        [facility]                 

   

 

                ELECTROLYTES                         Sodium Lvl      138        

                 135 

- 145                         2017                                        

                                                    [facility]     

               

 

                    ELECTROLYTES                         Creatinine Lvl      0.7

6                        

                    0.50 - 1.40                         2017              

                    

                                                    [facility]     

            

  

 

                HEMATOLOGY                         Lymphocytes     35.1         

                20.0

- 40.0                         2017                                       

                                                    [facility]     

               

 

                HEMATOLOGY                         Monocytes       7.0          

               2.0 - 

12.0                         2017                                         

                                                    [facility]     

               

 

                HEMATOLOGY                         Eosinophils     1.6          

               0.0 -

4.0                         2017                                          

                                                    [facility]     

               

 

                HEMATOLOGY                         Eosinophils #   0.1          

               0.0

- 0.5                         2017                                        

                                                    [facility]     

               

 

                HEMATOLOGY                         Monocytes #     0.5          

               0.0 -

0.8                         2017                                          

                                                    [facility]     

               

 

                HEMATOLOGY                         Basophils       0.6          

               0.0 - 

1.0                         2017                                          

                                                    [facility]     

               

 

                HEMATOLOGY                         Segs-Bands #    3.6          

               1.5 

- 8.1                         2017                                        

                                                    [facility]     

               

 

                HEMATOLOGY                         Lymphocytes #   2.3          

               1.0

- 5.5                         2017                                        

                                                    [facility]     

               

 

                HEMATOLOGY                         Segs            55.7         

                45.0 - 75.0

                          2017                                            

  

                                                    [facility]     

               

 

                HEMATOLOGY                         MPV             9.2          

               7.4 - 10.4   

                          2017                                            

     

                                                    [facility]     

               

 

                HEMATOLOGY                         RDW             13.4         

                11.5 - 14.5 

                          2017                                            

   

                                                    [facility]     

               

 

                HEMATOLOGY                         Platelet        205          

               133 - 

450                         2017                                          

                                                    [facility]     

               

 

                HEMATOLOGY                         MCH             30.4         

                27.0 - 31.0 

                          2017                                            

   

                                                    [facility]     

               

 

                HEMATOLOGY                         MCHC            34.3         

                32.0 - 36.0

                          2017                                            

  

                                                    [facility]     

               

 

                HEMATOLOGY                         Hct             38.3         

                42.0 - 54.0 

                          2017                                            

   

                                                    [facility]     

               

 

                HEMATOLOGY                         MCV             88.6         

                80.0 - 94.0 

                          2017                                            

   

                                                    [facility]     

               

 

                HEMATOLOGY                         Hgb             13.1         

                14.0 - 18.0 

                          2017                                            

   

                                                    [facility]     

               

 

                HEMATOLOGY                         RBC             4.32         

                4.70 - 6.10 

                          2017                                            

   

                                                    [facility]     

               

 

                HEMATOLOGY                         WBC             6.5          

               3.7 - 10.4   

                          2017                                            

     

                                                    [facility]     

               

 

                SPECIAL CHEMISTRY                         Hgb A1C         9.4   

                      

<=5.6 %                         2017                                      

                                                    [facility]     

               

 

                    BLOOD BANK RESULTS                         Antibody Scrn    

   Negative 

                    (4/16/15 12:00 PM)                                          

        2015  

                                                                        [facility]                    

 

                BLOOD BANK RESULTS                         ABO/Rh          O POS

                         

                          2015                                            

  

                                                    [facility]     

               

 

                CHEM PANEL                         eGFR            115          

                           

                    2015                                                  

<sup>1</sup>Result Comment: The eGFR is calculated using the CKD-EPI formula. In
most young, healthy individuals the eGFR will be >90 mL/min/1.73m2. The eGFR 
declines with age. An eGFR of 60-89 may be normal in some populations, 
particularly the elderly, for whom the CKD-EPI formula has not been extensively 
validated. Use of the eGFR is not recommended in the following populations:&
lt;br/><br/>Individuals with unstable creatinine concentrations, including 
pregnant patients and those with serious co-morbid conditions.<br/><br/>Patients
with extremes in muscle mass or diet. <br/><br/>The data above are obtained from
the National Kidney Disease Education Program (NKDEP) which additionally 
recommends that when the eGFR is used in patients with extremes of body mass 
index for purposes of drug dosing, the eGFR should be multiplied by the 
estimated BMI.                          [facility]                 

 

 

 

                CHEM PANEL                         Chloride Lvl    101          

               95 -

109                         2015                                          

                                                    [facility]     

               

 

                CHEM PANEL                         Calcium Lvl     9.2          

               8.5 -

10.5                         2015                                         

                                                    [facility]     

               

 

                CHEM PANEL                         CO2             30           

              24 - 32       

                    2015                                                  

   

                                        [facility]                 

   

 

                CHEM PANEL                         Potassium Lvl   3.9          

               3.5

- 5.1                         2015                                        

                                                    [facility]     

               

 

                CHEM PANEL                         BUN             15           

              7 - 22        

                    2015                                                  

    

                                        [facility]                 

   

 

                CHEM PANEL                         Creatinine Lvl  0.7          

               

0.5 - 1.4                         2015                                    

                                                    [facility]     

              



 

                CHEM PANEL                         Sodium Lvl      139          

               135 - 

145                         2015                                          

                                                    [facility]     

               

 

                CHEM PANEL                         Glucose Lvl     146          

               70 - 

99                         2015                                           

                                        <sup>2</sup>Interpretive Data: Adult ref

erence range values reflect the 

clinical guidelines<br/>of the [employer].                   
                                        [facility]                 

   

 

                CHEM PANEL                         AGAP            11.9         

                10.0 - 20.0

                          2015                                            

  

                                                    [facility]     

               

 

                HEMATOLOGY                         Monocytes #     0.6          

               0.0 -

0.8                         2015                                          

                                                    [facility]     

               

 

                HEMATOLOGY                         Eosinophils #   0.2          

               0.0

- 0.5                         2015                                        

                                                    [facility]     

               

 

                HEMATOLOGY                         Lymphocytes #   2.2          

               1.0

- 5.5                         2015                                        

                                                    [facility]     

               

 

                HEMATOLOGY                         Eosinophils     2.2          

               0.0 -

4.0                         2015                                          

                                                    [facility]     

               

 

                HEMATOLOGY                         Segs-Bands #    5.1          

               1.5 

- 8.1                         2015                                        

                                                    [facility]     

               

 

                HEMATOLOGY                         Segs            62.7         

                45.0 - 75.0

                          2015                                            

  

                                                    [facility]     

               

 

                HEMATOLOGY                         Lymphocytes     27.4         

                20.0

- 40.0                         2015                                       

                                                    [facility]     

               

 

                HEMATOLOGY                         Monocytes       7.3          

               2.0 - 

12.0                         2015                                         

                                                    [facility]     

               

 

                HEMATOLOGY                         Basophils       0.4          

               0.0 - 

1.0                         2015                                          

                                                    [facility]     

               

 

                HEMATOLOGY                         MPV             9.8          

               7.4 - 10.4   

                          2015                                            

     

                                                    [facility]     

               

 

                HEMATOLOGY                         RBC             4.54         

                4.70 - 6.10 

                          2015                                            

   

                                                    [facility]     

               

 

                HEMATOLOGY                         WBC             8.1          

               3.7 - 10.4   

                          2015                                            

     

                                                    [facility]     

               

 

                HEMATOLOGY                         Hgb             14.1         

                14.0 - 18.0 

                          2015                                            

   

                                                    [facility]     

               

 

                HEMATOLOGY                         Hct             40.7         

                42.0 - 54.0 

                          2015                                            

   

                                                    [facility]     

               

 

                HEMATOLOGY                         MCV             89.7         

                80.0 - 94.0 

                          2015                                            

   

                                                    [facility]     

               

 

                HEMATOLOGY                         Platelet        142          

               133 - 

450                         2015                                          

                                                    [facility]     

               

 

                HEMATOLOGY                         MCHC            34.5         

                32.0 - 36.0

                          2015                                            

  

                                                    [facility]     

               

 

                HEMATOLOGY                         RDW             12.8         

                11.5 - 14.5 

                          2015                                            

   

                                                    [facility]     

               

 

                HEMATOLOGY                         MCH             31.0         

                27.0 - 31.0 

                          2015                                            

   

                                                    [facility]     

               

 

                HEMATOLOGY                         INR             1.02         

                0.85 - 1.17 

                          2015                                            

   

                                        <sup>3</sup>Interpretive Data: RECOMMEND

ED RANGES FOR PROTIME INR:<br/>   2.0-

3.0 for most medical and surgical thromboembolic states.<br/>   2.5-3.5 for 
artificial heart valves and recurrent embolism.<br/><br/>INR SHOULD BE USED ONLY
FOR PATIENTS ON STABLE ANTICOAGULANT THERAPY.                         [facility]                    

 

                HEMATOLOGY                         PTT             37.1         

                22.9 - 35.8 

                          2015                                            

   

                                        <sup>4</sup>Interpretive Data: Heparin T

herapeutic Range:  57 - 92 Seconds    

                                        [facility]                 

   

 

                HEMATOLOGY                         PT              13.4         

                12.0 - 14.7  

                          2015                                            

    

                                                    [facility]     

               



                                                                                
                                                                                
                                                                                
                                                                                
                                                                                
                                                                                
                                                                                
                                                                                
                                                                                
                              



Pathology Reports





                                        No Data Provided for This Section       

             



                                                



Diagnostic Reports

                    



                    Report                         Value                        

 Date               

                                        Source                    

 

                          Abdomen AP DX                         EXAM: Abdomen AP

 DX

DATE: 2019 14:10 CDT.

INDICATION: N20.0 Calculus of kidney.

COMPARISON: None available.

TECHNIQUE: AP view of the abdomen. A total of 2 images were obtained.

FINDINGS:

Lines, Tubes and Hardware: Multiple surgical clips project over the left upper 
quadrant.

Lower Thorax: Unremarkable where visible.

Abdomen and Bowel: Nonobstructive bowel gas pattern. A moderate to large amount 
of formed stool is seen from the right colon to the rectum.

Calcifications: There is a questionable ovoid density which projects at the 
expected level of the lower pole of the right kidney. It measures approximately 
1.4 cm craniocaudal.

Bones and Soft Tissues: No acute abnormality. 

IMPRESSION:

                                        1.  1.4 cm calcification projects at the

 expected level of the lower pole of the

RIGHT kidney. The exact location is unclear. This could be within the transverse
colon at the hepatic flexure and could represent ingested, calcified material. 
Alternatively, this could represent a renal stone. Consider further evaluation 
with a renal US and/or renal stone CT.

                                        2.  Nonobstructive bowel gas pattern.

                                        3.  Moderate to large stool burden.

                          2019                          GABRIELA Restrepo   

 

               



                                                                    



Consultation Notes

                    



                                        No Data Provided for This Section       

             



                                                            



Discharge Summaries

                    



                                        No Data Provided for This Section       

             



                                                            



History and Physicals

                    



                                        No Data Provided for This Section       

             



                                                                



Vital Signs

                     



                    Vital Sign                         Value                    

     Date           

                          Comments                         Source               

     

 

                    Temperature Oral (F)                         98.0 F         

                

2017                                                   [facility]                    

 

                    Respitory Rate                         17                   

       2017   

                                                    [facility]     

      

        

 

                    Heart Rate                         86                       

   2017       

                                                    [facility]     

          

    

 

                    Systolic (mm Hg)                         108                

          2017

                                                    [facility]     

   

           

 

                    Diastolic (mm Hg)                         64                

          2017

                                                    [facility]     

   

           

 

                    Temperature Oral (F)                         98.1 F         

                

2017                                                   [facility]                    

 

                    Heart Rate                         109                      

    2017      

                                                    [facility]     

         

     

 

                    Temperature Oral (F)                         98.3 F         

                

2017                                                   [facility]                    

 

                    Systolic (mm Hg)                         168                

          2017

                                                    [facility]     

   

           

 

                    Diastolic (mm Hg)                         77                

          2017

                                                    [facility]     

   

           

 

                    Respitory Rate                         18                   

       2017   

                                                    [facility]     

      

        

 

                    Heart Rate                         84                       

   2017       

                                                    [facility]     

          

    

 

                    Systolic (mm Hg)                         143                

          2017

                                                    [facility] [place] [place] [facility]     

   

           

 

                    Diastolic (mm Hg)                         78                

          2017

                                                    [facility] [place] [place] [facility]     

   

           

 

                    Respitory Rate                         17                   

       2017   

                                                    [facility]     

      

        

 

                    Height                         177.8 cm                     

    2017      

                                                    [facility]     

         

     

 

                    Weight                         97.727                       

   2017       

                                                    [facility]     

          

    

 

                    BMI Calculated                         30.91                

          2017

                                                    [facility]     

   

           

 

                    BMI Calculated                         31.06                

          2017

                                                    [facility]     

   

           

 

                    Weight                         98.182                       

   2017       

                                                    [facility]     

          

    

 

                    Height                         177.8 cm                     

    2017      

                                                    [facility]     

         

     

 

                    Respitory Rate                         20                   

       2015   

                                                    [facility]     

      

        

 

                    Heart Rate                         76                       

   2015       

                                                    [facility]     

          

    

 

                    Temperature Oral (F)                         98.1 F         

                

2015                                                   [facility] [place] [place] 

[facility]                    

 

                    Systolic (mm Hg)                         105                

          2015

                                                    [facility] [place] [place] [facility]     

   

           

 

                    Diastolic (mm Hg)                         67                

          2015

                                                    [facility]     

   

           

 

                    Respitory Rate                         18                   

       2015   

                                                    [facility]     

      

        

 

                    Heart Rate                         77                       

   2015       

                                                    [facility]     

          

    

 

                    Temperature Oral (F)                         98.4 F         

                

2015                                                   [facility] [place] [place] 

[facility]                    

 

                    Systolic (mm Hg)                         105                

          2015

                                                    [facility] [place] [place] [facility]     

   

           

 

                    Diastolic (mm Hg)                         68                

          2015

                                                    [facility] [place] [place] [facility]     

   

           

 

                    Respitory Rate                         18                   

       2015   

                                                    [facility]     

      

        

 

                    Temperature Oral (F)                         98.2 F         

                

2015                                                   [facility] [place] [place] 

[facility]                    

 

                    Systolic (mm Hg)                         123                

          2015

                                                    [facility] [place] [place] [facility]     

   

           

 

                    Diastolic (mm Hg)                         72                

          2015

                                                    [facility]     

   

           

 

                    Heart Rate                         89                       

   2015       

                                                    [facility]     

          

    

 

                    Weight                         88.636                       

   2015       

                                                    [facility]     

          

    

 

                    BMI Calculated                         28.04                

          2015

                                                    [facility]     

   

           

 

                    Weight                         88.636                       

   2015       

                                                    [facility]     

          

    

 

                    Height                         177.8 cm                     

    2015      

                                                    [facility]     

         

     

 

                    Weight                         90.909                       

   2015       

                                                    [facility]     

          

    

 

                    BMI Calculated                         28.76                

          2015

                                                    [facility]     

   

           

 

                    Height                         177.8 cm                     

    2015      

                                                    [facility]     

         

     



                                                                                
                                                                                
                                                                                
                                                                                
                                                                                
                                                                                
                                                                                
                                                                                
                                                                                
                               



Encounters

                    



                    Location                         Location Details           

              

Encounter Type                         Encounter Number                         

Reason For Visit                         Attending Provider                     

                    ADM Date                         DC Date                    

     Status      

                                        Source                    

 

                    [facility]                                   

               

Inpatient                         [id number]                                  

                          Jose Watt                          2015                                                  

[facility]                                   

               

Bedded Outpatient                         [id number]                          

                          Jose Watt                          2017                                                  

[facility] [place]                    

                          

                          Outpt Diag Services                         [id number]

00                     

                                             Beatrice Eubanks                     

     2019                                            

    

                                         [facility] [place]                    



                                                                                
               



Procedures

                    



                    Procedure                         Code                      

   Date             

                    Perfomer                         Comments                   

      

Source                    

 

                    Gastric bypass                         [id number]            

                    

                                                                      [facility], [facility] [place]                    

 

                    Operation                         [id number]                 

                    

                                                                      [facility], [facility] [place]                    

 

                          Tonsillectomy and adenoidectomy                       

  [id number]                

                                                                                

            

                                        [facility], [facility] [place]

                    



                                                                                
               



Assessment and Plan

                    



                    Assessment and Plan                         Date            

             Source 

                  

 

                                        Extracted from:Title: Clinical Document

Author: Matt Valdes PA-C

Date: 17

Discharge Summary



Patient Name: Shanna Crump

FIN: [id number]

Admission Date: 17

Discharge Date: 17

Attending Physician: Jose Watt DO/NAYA

Diagnosis:  cervical spinal stenosis

HPI/Hospital Course: Patient admitted for an elective posterior cervical 
decompression.  Surgery was without complication.  Pain management and 
hospitalist followed patient throughout admission.  POD#1 he met all discharge 
criteria and was discharged to home.

Procedures: 17- Dr. Watt- bilateral posterior C3-C7 decompression

Discharge Medications: see med rec

Discharge Condition: good

Patient/Family Discharge Instructions: discharged to home on carb controlled 
diet with written activity and wound care instructions.

Follow up Appointments: Dr. Watt- 2 weeks



Extracted from:Title: Clinical Document

Author: Matt Valdes PA-C

Date: 17

NSERVIN

POD#1 posterior cervical decompression

doing well.  pain controlled.  ready for discharge.

strength 5/5 throughout

incision c/d/i

d/c drain

d/c home



Extracted from:Title: [facility] Consult Note

Author: Carroll Hodgson DO

Date: 17



Patient:   SHANNA CRUMP JR            MRN: [id number]            FIN: 
[id number]

Age:   47 years     Sex:  Male     :  1970

Associated Diagnoses:   None

Author:   Carroll Hodgson DO

Basic Information

Referral source:  Jose Watt DO.

Reason for consultation: Complex Acute Pain

Chief Complaint

post op pain control

History of Present Illness

          The patient presents with Location: cervical area

Quality: sharp, throbbing

Severity: 9/10

Timing: since surgery

Modifying factors: pain med with mild relief, moving makes it worse

 .

                                        48 yo M with pmhx of chronic cervical sp

inal stenosis from multiple previous 

traumas, chronic low back pain, DM, depression, NOEMI who received B/L C3-C7 
laminotomy on . [facility] has been consulted for post op acute pain control. The 
patient reports that the medications he has received post operatively in PACU 
have not been helping him to alleviate his pain. The pain is described as sharp 
and throbbing and graded 9/10. Due to his chronic spine pain, he takes oxycontin
40mg bid.



Histories

Past Medical History:

Active

Diabetes mellitus (123555878)

Back pain (0899571036)

Cervical stenosis of spine (362106033)

Anxiety (6828222040)

Depression (8540681232)

NOEMI (obstructive sleep apnea) (770927253)

Family History:

Cancer

Grandparent

Lung

Mother

Type 2 diabetes mellitus

Grandparent

Procedure history:

Operation (2905367520).

Gastric bypass ([id number]).

Operation ([id number]).

Tonsillectomy and adenoidectomy (083932731).

Social History

Social and Psychosocial Habits

Tobacco

                                        2017  Use: Never smoker

  Exposure to Tobacco Smoke None

  Cigarette Smoking Last 365 Days No

  Reg Smoking Cessation Counseling No

                                        .

Health Status

Allergies:

Allergic Reactions (Selected)

Severity Not Documented

NKDA- No reactions were documented.,

Allergies (1) Active        Reaction

NKDA        None Documented

Current medications:  (Selected)

Inpatient Medications

Ordered

Ambien: 10 mg, 2 tab, PO, Bedtime, PRN: Insomnia

Cepacol Sore Throat Cherry Sugar Free 15 mg-3.6 mg mucous membrane lozenge: 1 
lozenge, PO, Q2H, PRN: as needed for sore throat

Colace 100 mg oral capsule: 100 mg, 1 cap, PO, BID

Dextrose 50% Syringe: 12.5 gm, 25 mL, IVP, PRN, PRN: Blood Glucose Results

Dextrose 50% Syringe: 25 gm, 50 mL, IVP, PRN, PRN: Blood Glucose Results

Insulin regular: 10 unit, 0.1 mL, SUB-Q, TID-Before Meals, PRN: Blood Glucose 
Results

Insulin regular: 2 unit, 0.02 mL, SUB-Q, TID-Before Meals, PRN: Blood Glucose 
Results

Insulin regular: 4 unit, 0.04 mL, SUB-Q, TID-Before Meals, PRN: Blood Glucose 
Results

Insulin regular: 6 unit, 0.06 mL, SUB-Q, TID-Before Meals, PRN: Blood Glucose 
Results

Insulin regular: 8 unit, 0.08 mL, SUB-Q, TID-Before Meals, PRN: Blood Glucose 
Results

LORazepam: 1 mg, 1 tab, PO, PRN, PRN: as needed for anxiety

Phenergan: 12.5 mg, 0.5 mL, IVPB, Q4H, PRN: Nausea and Vomiting

Protonix: 40 mg, IVP, Daily

Zofran: 4 mg, 2 mL, IV, Q4H, PRN: Nausea

acetaminophen: 1,000 mg, 2 tab, PO, Q6Hnow

ceFAZolin (SCIP) + sodium chloride 0.9%  mL: 2 gm, 200 ml/hr, IVPB, 
ABXQ8H

dexamethasone: 4 mg, 1 mL, IVP, Q12H

diphenhydrAMINE: 25 mg, 1 cap, PO, TID, PRN: Itching

escitalopram: 20 mg, 2 tab, PO, Daily

gabapentin: 600 mg, 2 cap, PO, Q8Hnow

glucagon: 1 mg, IM, PRN, PRN: Blood Glucose Results

insulin isophane-insulin regular 70/30: 20 unit, 0.2 mL, SUB-Q, QAM and PM

ketOROLAC 30 mg/mL injectable solution: 15 mg, 1 mL, IV, Q6H

magnesium oxide: 400 mg, 1 tab, PO, Daily

metFORMIN 500 mg oral tablet: 500 mg, 1 tab, PO, BID

oxyCODONE 5 mg immediate release: 10 mg, 2 tab, PO, Q6H, PRN: Pain Score 7-10

oxyCODONE 5 mg immediate release: 5 mg, 1 tab, PO, Q6H, PRN: Pain Score 4-6

oxyCONTIN: 40 mg, 2 tab, PO, [id number]

pneumococcal 23-valent vaccine: 0.5 mL, IM, Daily

senna: 8.6 mg, 1 tab, PO, Daily, PRN: Constipation

sodium chloride 0.9% 1000 ml INJ 1,000 mL: 75 ml/hr, IV, Stop: 17 8:41:00 
CDT

tizanidine: 4 mg, 1 tab, PO, Q8H, PRN: as needed for muscle spasm

tramadol 50 mg oral tablet: 50 mg, 1 tab, PO, Q4H, PRN: Pain Score 1-3

Documented Medications

Suspended

Calcium 600 +D oral tablet: 1 tab, PO, Daily

LORazepam 1 mg oral tablet: 1 mg, 1 tab, PO, PRN

Norco 10/325 oral tablet: 1 tab, PO, TID, PRN: Pain, 0 Refill(s)

diclofenac topical 1% gel: TOP

escitalopram 20 mg oral tablet: 20 mg, 1 tab, PO, Daily

gabapentin 600 mg oral tablet: 600 mg, 1 tab, PO, BID

lidocaine 3% topical gel: 1 appl, TOP

metFORMIN 500 mg oral tablet: 500 mg, 1 tab, PO, BID

multivitamin: 1 tab, PO, Daily

oxyCONTIN 40 mg oral tablet, extended release: 40 mg, 1 tab, PO, Q12H

tizanidine 4 mg oral capsule: 4 mg, 1 cap, PO, TID, As needed,

Medications (33) Active

Scheduled: (13)

acetaminophen 500 mg TAB  1,000 mg 2 tab, PO, Q6Hnow

ceFAZolin 1 gm VL INJ + sodium chloride 0.9%  mL  2 gm, IVPB, ABXQ8H

dexamethasone 4mg/ml INJ  4 mg 1 mL, IVP, Q12H

docusate sodium 100 mg CAP  100 mg 1 cap, PO, BID

escitalopram 10 mg TAB  20 mg 2 tab, PO, Daily

gabapentin 300 mg CAP  600 mg 2 cap, PO, Q8Hnow

insulin NPH-REG (mix) 70/30 INJ 10ml  20 unit 0.2 mL, SUB-Q, QAM and PM

ketOROLAC 15 mg/1 ml INJ VL  15 mg 1 mL, IV, Q6H

magnesium oxide (242 mg elemental) tab  400 mg 1 tab, PO, Daily

metFORMIN 500 mg TAB  500 mg 1 tab, PO, BID

oxyCODONE 20 mg ERT  40 mg 2 tab, PO, [id number]

pantoprazole 40 mg INJ  40 mg, IVP, Daily

pneumococcal 23-valent vaccine  0.5 mL, IM, Daily

Continuous: (1)

sodium chloride 0.9% 1000 ml INJ 1,000 mL  1,000 mL, IV, 75 ml/hr

PRN: (19)

benzocaine-menthol 15 mg-3.6 mg Lozenges 16's  1 lozenge, PO, Q2H

Dextrose 50% 50 ml INJ syringe  12.5 gm 25 mL, IVP, PRN

Dextrose 50% 50 ml INJ syringe  25 gm 50 mL, IVP, PRN

diphenhydrAMINE 25 mg CAP  25 mg 1 cap, PO, TID

glucagon recombinant 1 mg PDR  1 mg, IM, PRN

insulin reg human rec 100 unit/ml INJ 3 ml Vial  2 unit 0.02 mL, SUB-Q, TID-
Before Meals

insulin reg human rec 100 unit/ml INJ 3 ml Vial  4 unit 0.04 mL, SUB-Q, TID-
Before Meals

insulin reg human rec 100 unit/ml INJ 3 ml Vial  6 unit 0.06 mL, SUB-Q, TID-
Before Meals

insulin reg human rec 100 unit/ml INJ 3 ml Vial  8 unit 0.08 mL, SUB-Q, TID-
Before Meals

insulin reg human rec 100 unit/ml INJ 3 ml Vial  10 unit 0.1 mL, SUB-Q, TID-
Before Meals

LORazepam 1 mg TAB  1 mg 1 tab, PO, PRN

ondansetron 4 mg/2ml INJ VL  4 mg 2 mL, IV, Q4H

oxyCODONE IR 5 mg TAB  5 mg 1 tab, PO, Q6H

oxyCODONE IR 5 mg TAB  10 mg 2 tab, PO, Q6H

promethazine 25 mg/1 ml INJ  12.5 mg 0.5 mL, IVPB, Q4H

senna 8.6 mg TAB  8.6 mg 1 tab, PO, Daily

tizanidine 4 mg TAB  4 mg 1 tab, PO, Q8H

traMADol 50 mg TAB  50 mg 1 tab, PO, Q4H

zolpidem 5 mg TAB  10 mg 2 tab, PO, Bedtime

Problem list:

Active Problems (7)

Anxiety 

Back pain 

Cervical stenosis of spine 

Depression 

Diabetes mellitus 

NOEMI (obstructive sleep apnea) 

Pain 



Review of Systems

Constitutional:  Negative.

Respiratory:  Negative.

Cardiovascular:  Negative.

Abdomen: Negative

Musculoskeletal: back pain, muscle pain

Neurologic:  Negative.

All other systems are negative



Physical Examination

VS/Measurements

Measurements from flowsheet : Measurements

                                        2017 07:18

Heparin Dosing Weight (kg)

                                        82.89

                                        2017 07:18

Height

                                        177.8 cm

Height Collection Method

Stated

Weight

                                        97.727 kg

Dosing Weight Difference Percent

                                        -0.463 %

Dosing Weight Collection Method

Estimated

Body Surface Area

                                        2.197 m2

Body Mass Index

                                        30.91 m2

,

Vital Signs (last 24 hrs)_____        Last Charted___________

Temp Oral        97.6 DegF  (:)

Heart Rate Peripheral        79 bpm  (:)

Resp Rate            16 BRMIN  ()

SBP        H 147mmHg  ()

DBP        87 mmHg  ()

SpO2        94 %  ()

Weight        97.727 kg  ()

Height        177.8 cm  ()

BMI        30.91  ()

PE

General:  Alert and oriented, moderate distress with pain

Respiratory:  Respirations are non-labored.

Cardiovascular:  RRR

Abdomen: soft, ND, NT

Integumentary:  Warm.

Neurologic:  Alert

Cognition and Speech:  Oriented.

Psychiatric:  Cooperative.



Review / Management

Results review:

Labs (Last four charted values)

WBC                     6.5    ()

Hgb                     L 13.1    ()

Hct                     L 38.3    ()

Plt                     205    ()

Na                      138    ()

K                       4.1    ()

CO2                     31    ()

Cl                      101    ()

Cr                      0.76    ()

BUN                     13    ()

Glucose Random          95    ()

Mg                      2.1    ()

Ca                      8.8    ()    .

Impression and Plan

Diagnosis

Orders

                                        48 yo M with pmhx of chronic cervical sp

inal stenosis from multiple previous 

traumas, chronic low back pain, DM, depression, NOEMI who received B/L C3-C7 
laminotomy on . [facility] has been consulted for post op acute pain control.

                                        - Resuming his home med of oxycontin 40m

g q12h

                                        - Adding oxycodone 5/10mg q6h prn for br

eak-through

                                        - Adding gabapentin 600mg q8h and 1g ace

taminophone q6h

                                        - Mg level at 2.1. adding Magnesium oxid

e 400mg qD for next 3 days

                                        - Thank you for the opportunity to parti

cipate in this patient's care. [facility] will

continue to follow

Education and Follow-up:       Counseled: Patient, Regarding treatment, 
Regarding medications.

Approximate patient care time: 45 min

Greater than 50% of this time was spent on counselling and coordination of care 
on the matters mentioned above in assessment and recommendations

Addendum by Toni Villalobos MD on 2017 13:37

TEACHING PHYSICIAN ADDENDUM: I saw and personally examined this patient and  
discussed the plan of care with this resident. I have reviewed the note below 
and agree with the history, examination findings and the plan of care.



Extracted from:Title: Surgical procedure note

Author: Jose Watt DO

Date: 17

Procedure:

                                        1) Bilateral C3-C4 laminotomy, partial f

acetectomy, with facet undercutting and 

C4 root foraminotomies (01170-02)

                                        2) Bilateral C4-C5 laminotomy, partial f

ectectomy, with facet undercutting and 

C5 root foraminotomies (51392-79)

                                        3) Bilateral C5-C6 laminotomy, partial f

acetectomy, with facet undercutting and 

C6 root foraminotomies (29802-05)

                                        4) Bilateral C6-C7 laminotomy, partial f

acetectomy, with facet undercutting and 

C7 root foraminotomies (86032-55)



Preop Diagnosis:

                                        1) Spinal stenosis, cervical region (M48

.02)

                                        2) Other cervical disc degeneration, hig

h cervical region (M50.31)

                                        3) Other cervical disc degeneration, C4-

C5 interspace level (M50.321)

                                        4) Other cervical disc degeneration, C5-

C6 interspace level (M50.322)

                                        5) Other cervical disc degeneration, C6-

C7 interspace level (M50.323)

                                        6) Other spondylosis with myelopathy, ce

rvical region  (M47.12)

Postop Diagnosis:

                                        1) Spinal stenosis, cervical region (M48

.02)

                                        2) Other cervical disc degeneration, hig

h cervical region (M50.31)

                                        3) Other cervical disc degeneration, C4-

C5 interspace level (M50.321)

                                        4) Other cervical disc degeneration, C5-

C6 interspace level (M50.322)

                                        5) Other cervical disc degeneration, C6-

C7 interspace level (M50.323)

                                        6) Other spondylosis with myelopathy, ce

rvical region  (M47.12)

Surgeon:

Jose Watt DO, FACOS

Assistant:

Matt Valdes PA-C

EBL:

                                        150 cc

Blood given:

None

Drain:

                                        10 French Hemovac drain placed

Complications:

None

Dosposition:

Recovery in stable condition

Procedure:

Following obtainment of informed consent the following procedure was performed. 
The patient was brought to the operating room on the transport cart in the 
supine position.  Smooth induction of anesthesia was achieved with adequate 
venous line access secured.  The patient was then turned prone upon a Alfie 
frame with the arms tucked and appropriate points padded.

                                        10 Minute Betadine soap solution scrub p

rep was carried out directed at the 

posterior cervical region.  Sterile draping was then applied.  A midline 
incision was made centered from C3 thru C7 posteriorly.  Elevation of the fascia
and musculature tissues off the osseus spine was carried out to expose the 
spinous processes and laminae of C3 thru C7 bilaterally.

Micro drill in combination with 1 mm., 2 mm., and 3 mm. Kerrison punches were 
used to create laminotomies beginning on the left side.  Facet undercutting 
along with expansion of the foramina using micro curette to complete 
foraminotomies were carried on.  Confirmation of root freedom was achieved using
micro nerve hook probe.  Hemostasis was achieved.

With this completed on the left side, in a similar fashion, laminotomies, 
partial facetectomies, and foraminotomies were achieved in sequence on the right
side.  Again, confirmation of root freedom was achieved on the right side as 
well.  Hemostasis was achieved and confirmed.

Copious amounts of antibiotic irrigation was used within the wound.  Hemostasis 
was confirmed.  A 10 French Hemovac Drain was placed deep within the wound and 
brought out through a separate stab point.

The wound was closed using 0 Neurolon on the deep fascial layers in an 
interrupted fashion.  2-0 Vicryl was used on the superficial fascial layers and 
staples used on the skin surface.

The final sponge and needles counts were noted to be correct times two.  There 
were no identified intraoperative anesthetic or surgical complications.

Jose Watt DO, FACOS

                          2017                         [facility]                    

 

                                        Extracted from:Title: POD #1

Author: Lourdes Hawthorne

Date: 4/17/15

Doing well, resting comfortably in bed. Pain minimal, well controlled. Up, 
ambulating. No HA, N/V, dizziness. Voiding, no BM.

A&Ox3

VSS, afebrile

Wound flat, no EED, steri strips intact, cleansed/redressed

Motor 4+/5 left ankle dorsiflexion, otherwise 5/5

Pedal pulses 2+ b/l

A/P

D/W Shukri

Elevated BGM overnight 400's - due to dextrose in abx fluid, corrected with 
sliding scale

Home today

Disussed activity/wound care

He has his postop RX

Lourdes Hawthorne PA-C

                          2015                         [facility]                    



                                                             



Plan of Care





                                        No Data Provided for This Section       

             



                                                                



Social History

                    



                    Social History                         Date                 

        Source      

             

 

                                        Social History TypeResponse

Smoking Status

Never smoker; Exposure to Tobacco Smoke None; Cigarette Smoking Last 365 Days 
No; Reg Smoking Cessation Counseling No

entered on: 2017                          [facility]   

 

               

 

                                        Social History TypeResponse

Smoking Status

Never smoker; Exposure to Tobacco Smoke None; Cigarette Smoking Last 365 Days 
No; Reg Smoking Cessation Counseling No

                          2017                         [facility]                    



                                                                                
   



Family History

                    



                                        No Data Provided for This Section       

             



                                                            



Advance Directives

                    



                                        No Data Provided for This Section       

             



                                                            



Functional Status

                    



                                        No Data Provided for This Section

## 2020-05-16 NOTE — XMS REPORT
Clinical Summary

                             Created on: 2020



Norman Epperson Jr.

External Reference #: [id number]

: 1970

Sex: Male



Demographics





                          Address                   [address] [place] [address] [address]

[place]  [address]

 

                          Home Phone                [phone]

 

                          Preferred Language        English

 

                          Marital Status            

 

                          Sikhism Affiliation     Unknown

 

                          Race                      White

 

                          Ethnic Group              Non-





Author





                          Author                    [facility]

 

                          Organization              [facility]

 

                          Address                   Unknown

 

                          Phone                     Unavailable







Support





                Name            Relationship    Address         Phone

 

                Amy Epperson     ECON            Unknown         [phone]







Care Team Providers





                    Care Team Member Name Role                Phone

 

                    Jayson Caal DO PCP                 [phone]







Allergies

No Known Allergies



Medications





                          End Date                  Status



              Medication   Sig          Dispensed    Refills      Start  



                                         Date  

 

                                                    Active



                 escitalopram (LEXAPRO) 20  TK 2 TS PO      2               

3/201  



                     MG tablet           QAM                 8  

 

                                                    Active



                 oxyCODone (OxyCONTIN) 40  TAKE ONE (1)    0                 



                     mg tablet,oral      TABLET(S) BY        9  



                           only,ext.rel.12 hr ER     MOUTH TWICE A     



                           tablet                    DAY.     

 

                                                    Active



                     HYDROcodone-acetaminophen    0                    

 



                           (NORCO)  mg per     9  



                                         tablet      

 

                                                    Active



                     gabapentin (NEURONTIN)    0                     



                           600 mg tablet             9  

 

                                                    Active



                     TiZANidine (ZANAFLEX) 4    1                   12/10/201  



                           MG capsule                8  

 

                                                    Active



                     hydroCHLOROthiazide    1                     



                           (HYDRODIURIL) 25 MG       8  



                                         tablet      

 

                                                    Active



                     lisinopril          1                     



                           (PRINIVIL,ZESTRIL) 20 mg     8  



                                         tablet      

 

                                                    Active



                 zolpidem (AMBIEN) 10 mg  TK 1 T PO       2               01/10/

201  



                     tablet              QHS FOR             9  



                                         INSOMNIA     

 

                                                    Active



                     NOVOLIN 70/30 U-100    1                     



                           INSULIN 100 unit/mL       8  



                                         (70-30) injection      

 

                                                    Active



                     cyanocobalamin (VITAMIN  Take 1,000          0   



                           B-12) 1000 MCG tablet     mcg by mouth     



                                         daily.     

 

                                                    Active



                     multivit-minerals/ferrous  Take by             0   



                           fum (MULTI VITAMIN ORAL)  mouth.     

 

                                                    Active



                     potassium gluconate 595  Take by             0   



                           mg (99 mg) tablet         mouth.     

 

                                                    Active



                     calcium carb/vit    Take by             0   



                           D3/minerals               mouth.     



                                         (CALCIUM-VITAMIN D ORAL)      

 

                                                    Active



                     cholecalciferol, vitamin  Take 2,000          0   



                           D3, (VITAMIN D3) 2,000    Units by     



                           unit capsule capsule      mouth daily.     

 

                                                    Active



                     ginkgo biloba (GINKGO  Take by             0   



                           ORAL)                     mouth.     

 

                          2020                



              acetaminophen-codeine  Take 1-2     15 tablet    0              



                     (TYLENOL WITH CODEINE #3)  tablets by          0  



                           300-30 mg per             mouth every 6     



                           tabletIndications: acute  (six) hours     



                           pain                      as needed for     



                                         moderate pain     



                                         for up to 15     



                                         days .acute     



                                         pain.     







Active Problems





Not on file



Encounters





                          Care Team                 Description



                     Date                Type                Specialty  

 

                                        



Radames Del Cid MD                    Lumbar radiculopathy (Primary Dx); 

Lumbar sprain, initial encounter



                     2020          Emergency           Emergency Medicine 

 



after 2019



Family History





   



                 Medical History  Relation        Name            Comments

 

   



                           Hypertension              Father  

 

   



                           Kidney disease            Father  

 

   



                           Scoliosis                 Father  

 

   



                           Hypertension              Mother  

 

   



                           Lung cancer               Mother  

 

   



                           Thyroid disease           Mother  







   



                 Relation        Name            Status          Comments

 

   



                                         Father   

 

   



                                         Mother   







Social History





                                        Date



                 Tobacco Use     Types           Packs/Day       Years Used 

 

                                         



                                         Never Smoker    

 

    



                                         Smokeless Tobacco: Never   



                                         Used   







                    Drinks/Week         oz/Week             Comments



                                         Alcohol Use   

 

                                                             



                                         No   







  



                     Alcohol Habits      Answer              Date Recorded

 

  



                     How often do you have a drink containing alcohol?  Never   

            2019

 

  



                           How many drinks containing alcohol do you have on  No

t asked 



                                         a typical day when you are drinking?  

 

  



                           How often do you have six or more drinks on one  Not 

asked 



                                         occasion?  







 



                           Sex Assigned at Birth     Date Recorded

 

 



                                         Not on file 







                                        Industry



                           Job Start Date            Occupation 

 

                                        Not on file



                           Not on file               Not on file 







                                        Travel End



                           Travel History            Travel Start 

 





                                         No recent travel history available.







Last Filed Vital Signs





                    Reading             Time Taken          Comments



                                         Vital Sign   

 

                    145/88              2020  6:15 PM CST  



                                         Blood Pressure   

 

                    60                  2020  6:15 PM CST  



                                         Pulse   

 

                    37 C (98.6 F)   2020  2:52 PM CST  



                                         Temperature   

 

                    18                  2020  6:15 PM CST  



                                         Respiratory Rate   

 

                    99%                 2020  6:15 PM CST  



                                         Oxygen Saturation   

 

                    -                   -                    



                                         Inhaled Oxygen   



                                         Concentration   

 

                    83.9 kg (185 lb)    2020  2:52 PM CST  



                                         Weight   

 

                    177.8 cm (5' 10")   2020  2:52 PM CST  



                                         Height   

 

                    26.54               2020  2:52 PM CST  



                                         Body Mass Index   







Plan of Treatment





   



                 Health Maintenance  Due Date        Last Done       Comments

 

   



                           DIABETIC RETINAL EYE EXAM  1970  

 

   



                           DIABETIC FOOT EXAM        04/15/1980  

 

   



                           URINE MICROALBUMIN        04/15/1980  

 

   



                           COLONOSCOPY SCREENING     04/15/2020  

 

   



                           SHINGLES VACCINES (#1)    04/15/2020  

 

   



                           INFLUENZA VACCINE         2020  







Procedures





                                        Comments



                 Procedure Name  Priority        Date/Time       Associated Diag

nosis 

 

                                        



Results for this procedure are in the results section.



                     XR HIP 2-3 VIEWS LEFT  STAT                2020  



                                         5:14 PM CST  

 

                                        



Results for this procedure are in the results section.



                     CT LUMBAR SPINE WO  STAT                2020  



                           CONTRAST                  4:11 PM CST  



after 2019



Results

* XR Hip 2-3 View Left (2020  5:14 PM CST)







                                         Specimen

 









 



                           Narrative                 Performed At

 

 



                           EXAMINATION: XR HIP 2-3 VIEWS LEFT  [facility]



                                         CLINICAL HISTORY: s p fall 



                                         TECHNIQUE:  3 views of left hip o

btained. 



                                         COMPARISON: None. 



                                         IMPRESSION: 



                                         Mild to moderate OA. An osseous bump at

 the femoral head neck junction; 

clinical 



                                         evidence of femoral acetabular impingem

ent. No acute fracture or dislocation 



                                         identified. 



                                         [id number] 







                                        Procedure Note

 

                                        



Hm Interface, Radiology Results Incoming - 2020  5:19 PM CST



EXAMINATION:  XR HIP 2-3 VIEWS LEFT



CLINICAL HISTORY:  s p fall



TECHNIQUE:   3  views of left hip  obtained.



COMPARISON:  None.



IMPRESSION:

Mild to moderate OA. An osseous bump at the femoral head neck junction; clinical
evidence of femoral acetabular impingement. No acute fracture or dislocation 
identified. 







[facility]-[id number]











   



                 Performing Organization  Address         City/State/[facility]  Ph

one Number

 

   



                     [facility]          [address] [place] [address] 





* CT Lumbar Spine Wo Contrast (2020  4:11 PM CST)







                                         Specimen

 









 



                           Narrative                 Performed At

 

 



                           EXAMINATION: CT LUMBAR SPINE WO CONTRAST  [facility]

T



                                         CLINICAL HISTORY: s p fall, 



                                         COMPARISON: None. 



                                         Technique: Multiple axial CT images of 

the lumbar spine are obtained without 

the 



                                         use of intravenous contrast. Coronal an

d sagittal 3-D reconstructions are 



                                         obtained. 



                                         CT scans are performed using radiation 

dose reduction techniques. Technical 



                                         factors are evaluated and adjusted to e

nsure appropriate moderation of 

exposure. 



                                         Automated dose management technology 

is applied to adjust radiation exposure 



                                         while achieving a 



                                         diagnostic quality image. 



                                         FINDINGS: 



                                         The visualized portions of the retroper

itoneum are unremarkable. The abdominal 



                                         aorta has no aneurysmal dilatation. The

re is no free fluid seen within the 



                                         pelvis. 



                                         The vertebral bodies maintain normal he

ight and alignment. There is no acute 



                                         fracture or subluxation. 



                                         Degenerative changes are present within

 the cervical spine. There are small 



                                         osteophytes seen posteriorly at level L

4-L5. There is minimal annular bulging 



                                         seen at level L4-L5. There is no narrow

ing of the spinal canal. 



                                         The SI joints are unremarkable. The sac

rum demonstrates no abnormality. 



                                         IMPRESSION: 



                                         1. There is no acute fracture or sublux

ation. 



                                         2. There are minimal degenerative lara

es present consistent with 



                                         osteoarthritis. 



                                         3. There are no osseous lesions present

. 







                                        Procedure Note

 

                                        



 Interface, Radiology Results Incoming - 2020  4:20 PM CST



EXAMINATION:  CT LUMBAR SPINE WO CONTRAST



CLINICAL HISTORY:  s p fall, 



COMPARISON:  None.



Technique: Multiple axial CT images of the lumbar spine are obtained without the
use of intravenous contrast. Coronal and sagittal 3-D reconstructions are 
obtained.



CT scans are performed using radiation dose reduction techniques.  Technical 
factors are evaluated and adjusted to ensure appropriate moderation of exposure.
 Automated dose management technology is applied to adjust radiation exposure 
while achieving a 

diagnostic quality image.









FINDINGS:



The visualized portions of the retroperitoneum are unremarkable. The abdominal 
aorta has no aneurysmal dilatation. There is no free fluid seen within the 
pelvis.



The vertebral bodies maintain normal height and alignment. There is no acute 
fracture or subluxation. 



Degenerative changes are present within the cervical spine. There are small 
osteophytes seen posteriorly at level L4-L5. There is minimal annular bulging 
seen at level L4-L5. There is no narrowing of the spinal canal.



The SI joints are unremarkable. The sacrum demonstrates no abnormality.





IMPRESSION:



                                        1. There is no acute fracture or subluxa

tion.

                                        2. There are minimal degenerative change

s present consistent with 

osteoarthritis.

                                        3. There are no osseous lesions present.









   



                 Performing Organization  Address         City/State[facility]  Ph

one Number

 

   



                      [facility]          [address] [place] [address] 





after 2019



Insurance





                                        Type



            Payer      Benefit    Subscriber ID  Effective  Phone      Address 



                           Plan /                    Dates   



                                         Group     

 

                                        Medicare



              MEDICARE     MEDICARE     xxxxxxxxxxx  3/1/2014-P   [place] 



                     [place]          [place]              [place] 



                                         B     

 

                                        PPO



                 BCBS            BCBS            xxxxxxxxxxxx    2018-P   



                           CHOICE                    resent   



                                         PPO/[employer]IE JAMES PPO     







     



            Guarantor Name  Account    Relation to  Date of    Phone      Oanh sanders Address



                     Type                Patient             Birth  

 

     



            Norman Epperson DANI Cesar.  Personal/F  Self       1970  [phone] [id number]



                     amily               (Home)              [place] [address]







Advance Directives





For more information, please contact: [phone]







                          Patient Representative    Explanation



                           Type                      Date Recorded  

 

                                                     



                           Advance Directives,       2020  6:26 PM  



                                         Living Will and   



                                         Medical Power of

## 2020-05-16 NOTE — XMS REPORT
Summary of Care: 17 - 17

                             Created on: 10/08/2049



SHANNA CRUMP JR

External Reference #: [id number]

: 1970

Sex: Male



Demographics





                          Address                   [address] [place] [address]  [id number]

 

                          Home Phone                [phone]

 

                          Preferred Language        English

 

                          Marital Status            

 

                          Buddhist Affiliation     None

 

                          Race                      White/

 

                          Ethnic Group              Unknown





Author





                          Author                    [facility]

 

                          Organization              [facility]

 

                          Address                   Unknown

 

                          Phone                     Unavailable







Encounter





GLENN Renee(FIN) [id number] Date(s): 17 - 17

[facility] [address] [facility] provided by         
  [employer]       [place] [place] [address]-    
[phone]

Discharge Disposition: Home or Self Care

Attending Physician: Jose Watt DO

Referring Physician: Jose Watt DO





Vital Signs





                    1                   2                   3



                                         Most recent to   



                                         oldest [Reference   



                                         Range]:   

 

                                        177.8 cm 

                          (17 7:18 AM)          177.8 cm 

                          (17 12:30 PM)         



                                         Height   

 

                                        98.0 DegF 

                          (17 4:55 PM)          98.1 DegF 

                          (17 4:21 PM)          98.3 DegF 

                                        (17 1:54 PM)



                                         Temperature Oral   



                                         [96.4-99.1 DegF]   

 

                                        108/64 mmHg 

                          (17 4:21 PM)          168/77 mmHg 

*HI*

                          (17 1:54 PM)          143/78 mmHg 

*HI*

                                        (17 8:02 AM)



                                         Blood Pressure   



                                         [/60-90 mmHg]   

 

                                        17 BRMIN 

                          (17 4:21 PM)          18 BRMIN 

                          (17 1:54 PM)          17 BRMIN 

                                        (17 8:02 AM)



                                         Respiratory Rate   



                                         [14-20 BRMIN]   

 

                                        86 bpm 

                          (17 4:21 PM)          109 bpm 

*HI*

                          (17 1:54 PM)          84 bpm 

                                        (17 8:02 AM)



                                         Peripheral Pulse   



                                         Rate [ bpm]   

 

                                        97.727 kg 

                          (17 7:18 AM)          98.182 kg 

                          (17 12:30 PM)         



                                         Weight   

 

                                        30.91 m2 

                          (17 7:18 AM)          31.06 m2 

                          (17 12:30 PM)         



                                         Body Mass Index   







Problem List





    



              Condition    Effective Dates  Status       Health Status  Informan

t

 

    



                           Anxiety(Confirmed)        Active  

 

    



                           Back pain(Confirmed)      Active  

 

    



                           Diabetes                  Active  



                                         mellitus(Confirmed)    

 

    



                           Depression(Confirmed      Active  



                                         )    

 

    



                           NOEMI (obstructive          Active  



                                         sleep    



                                         apnea)(Confirmed)    

 

    



                           Pain(Confirmed)           Active  

 

    



                           Cervical stenosis of      Active  



                                         spine(Confirmed)    







Allergies, Adverse Reactions, Alerts





   



                 Substance       Reaction        Severity        Status

 

   



                           NKDA                      Active







Medications





acetaminophen

1,000 mg, 2 tab, Route: PO, Drug form: TAB, Q6Hnow, Dosing Weight 97.727, kg, St
art date: 17 14:00:00 CDT, Duration: 30 day, Stop date: 17 8:00:00 C
DT

Notes: Max acetaminophen 4000 mg/day (4 gm/day).  (Same as: Tylenol Extra Streng
th)

Start Date: 17

Stop Date: 17

Status: Discontinued



Ambien

10 mg, 2 tab, Route: PO, Drug form: TAB, Bedtime, Dosing Weight 97.727, kg, PRN 
Insomnia, Start date: 17 8:41:00 CDT, Duration: 30 day, Stop date: 
7 8:40:00 CDT

Notes: (Same As: Ambien)

Start Date: 17

Stop Date: 17

Status: Discontinued



ANES HYDROmorphone

0.5 mg, 0.25 mL, Route: IVP, Drug form: INJ, Q5Min, Dosing Weight 97.727, kg, VT
N Pain Score 7-10, Start date: 17 11:00:00 CDT, Duration: 4 doses or times
, Stop date: Limited # of times

Notes: Same as: Dilaudid

Start Date: 17

Stop Date: 17

Status: Completed



ANES ondansetron

4 mg, 2 mL, Route: IVP, Drug form: INJ, ONCE, Dosing Weight 97.727, kg, PRN Naus
ea & Vomiting, Start date: 17 11:00:00 CDT

Notes: (Same as: Zofran)  *** MEDICATION WASTE ***Product Size:  4 mgProduct Was
annie:  ___ mg

Start Date: 17

Stop Date: 17

Status: Discontinued



ANES oxyCODONE

10 mg, 2 tab, Route: PO, Drug form: TAB, Q4H, Dosing Weight 97.727, kg, PRN Pain
Score 7-10, Start date: 17 11:00:00 CDT, Duration: 30 day, Stop date:  10:59:00 CDT

Notes: (Same as: Roxicodone)

Start Date: 17

Stop Date: 17

Status: Discontinued



ceFAZolin (ANES)

Route: IV, Drug form: INJ, ONCE, Stop date: 17 9:42:00 CDT

Start Date: 17

Stop Date: 17

Status: Completed



ceFAZolin (SCIP) + sodium chloride 0.9%  mL

2 gm, Route: IVPB, ABXQ8H, Dosing Weight 97.727, kg, Start date: 17 17:00:
00 CDT, Duration: 24 hr, Stop date: 17 9:00:00 CDT, ABX Indication: Surgic
al Prophylaxis

Notes: (Same As: Jackie Pillai)  *** MEDICATION WASTE ***Product Size:  1000 mgP
roduct Wasted:  ___ mg

Start Date: 17

Stop Date: 17

Status: Completed



Cepacol Sore Throat Cherry Sugar Free 15 mg-3.6 mg mucous membrane lozenge

1 lozenge, Route: PO, Drug Form: ALEXIS, Dosing Weight 97.727, kg, Q2H, PRN as need
ed for sore throat, Start date: 17 8:41:00 CDT, Duration: 30 day, Stop franklin
e: 17 8:40:00 CDT

Notes: Cepacol lozengesDispense 1 box = 16 lozenges  (Same As: Cepacol Lozenges)

Start Date: 17

Stop Date: 17

Status: Discontinued



Colace 100 mg oral capsule

100 mg, 1 cap, Route: PO, Drug form: CAP, BID, Dosing Weight 97.727, kg, Start d
ate: 17 9:00:00 CDT, Duration: 30 day, Stop date: 17 17:00:00 CDT

Notes: (Same as: Colace) (Do Not Crush)

Start Date: 17

Stop Date: 17

Status: Discontinued



cyclobenzaprine

10 mg, 1 tab, Route: PO, Drug form: TAB, TID, Dosing Weight 97.727, kg, PRN Spas
m, Start date: 17 8:41:00 CDT, Duration: 30 day, Stop date: 17 8:40:
00 CDT

Notes: (Same As: Flexeril)

Start Date: 17

Stop Date: 17

Status: Discontinued



dexamethasone

4 mg, 1 mL, Route: IVP, Drug form: INJ, Q12H, Dosing Weight 97.727, kg, Start da
te: 17 9:00:00 CDT, Duration: 30 day, Stop date: 17 21:00:00 CDT

Notes: Concentration: 4mg/ml

Start Date: 17

Stop Date: 17

Status: Discontinued



dexamethasone (ANES)

Route: IV, Drug form: INJ, ONCE, Stop date: 17 9:57:00 CDT

Start Date: 17

Stop Date: 17

Status: Completed



Dextrose 50% Syringe

25 gm, 50 mL, Route: IVP, Drug Form: INJ, Dosing Weight 97.727, kg, PRN, PRN Blo
od Glucose Results, Start date: 17 16:02:00 CDT, Duration: 30 day, Stop da
te: 17 16:01:00 CDT

Start Date: 17

Stop Date: 17

Status: Discontinued



Dextrose 50% Syringe

12.5 gm, 25 mL, Route: IVP, Drug Form: INJ, Dosing Weight 97.727, kg, PRN, PRN B
lood Glucose Results, Start date: 17 16:02:00 CDT, Duration: 30 day, Stop 
date: 17 16:01:00 CDT

Start Date: 17

Stop Date: 17

Status: Discontinued



diclofenac topical 1% gel

TOP

Start Date: 17

Stop Date: 17

Status: Discontinued



diphenhydrAMINE

25 mg, 1 cap, Route: PO, Drug form: CAP, TID, Dosing Weight 97.727, kg, PRN Itch
ing, Start date: 17 8:41:00 CDT, Duration: 30 day, Stop date: 17 8:4
0:00 CDT

Notes: (Same as: Benadryl)

Start Date: 17

Stop Date: 17

Status: Discontinued



escitalopram

20 mg, 2 tab, Route: PO, Drug form: TAB, Daily, Dosing Weight 97.727, kg, Start 
date: 17 9:00:00 CDT, Duration: 30 day, Stop date: 17 9:00:00 CDT

Notes: (Same as: Lexapro)

Start Date: 17

Stop Date: 17

Status: Discontinued



escitalopram 20 mg oral tablet

20 mg = 1 tab, PO, Daily

Start Date: 17

Status: Ordered



fentaNYL (ANES)

Route: IV, Drug form: INJ, ONCE, Stop date: 17 9:42:00 CDT

Start Date: 17

Stop Date: 17

Status: Completed



gabapentin

600 mg, 2 cap, Route: PO, Drug form: CAP, Q8Hnow, Dosing Weight 97.727, kg, Star
t date: 17 14:00:00 CDT, Duration: 30 day, Stop date: 17 6:00:00 CDT

Notes: (Same as: Neurontin)

Start Date: 17

Stop Date: 17

Status: Discontinued



gabapentin

600 mg, 2 cap, Route: PO, Drug form: CAP, BID, Dosing Weight 97.727, kg, Start d
ate: 17 9:00:00 CDT, Duration: 30 day, Stop date: 17 17:00:00 CDT

Notes: (Same as: Neurontin)

Start Date: 17

Stop Date: 17

Status: Discontinued



gabapentin 300 mg oral capsule

300 mg, 1 cap, Route: PO, Drug form: CAP, ONCE, Dosing Weight 98.182, kg, Priori
ty: STAT, Start date: 17 7:03:00 CDT, Stop date: 17 7:03:00 CDT

Notes: (Same as: Neurontin)

Start Date: 17

Stop Date: 17

Status: Completed



gabapentin 600 mg oral tablet

600 mg = 1 tab, PO, BID

Start Date: 17

Status: Ordered



glucagon

1 mg, Route: IM, Drug form: PDR/INJ, PRN, Dosing Weight 97.727, kg, PRN Blood Gl
ucose Results, Start date: 17 16:02:00 CDT, Duration: 30 day, Stop date: 0
17 16:01:00 CDT

Start Date: 17

Stop Date: 17

Status: Discontinued



glycopyrrolate (ANES)

Route: IV, Drug form: INJ, ONCE, Stop date: 17 10:53:00 CDT

Start Date: 17

Stop Date: 17

Status: Completed



insulin isophane-insulin regular 70/30

20 unit, 0.2 mL, Route: SUB-Q, Drug form: INJ, QAM & PM, Dosing Weight 97.727, 
kg, Start date: 17 18:00:00 CDT, Duration: 30 day, Stop date: 17 
17:00:00 CDT

Notes: Roll in palms of hands gently; Do not shake. (Same as: NovoLIN Mix 70/30)
Do not hold insulin without contacting prescriberWASTE: F/P - Black; E - Municip
al Trash Bin

Start Date: 17

Stop Date: 17

Status: Discontinued



Insulin regular

2 unit, 0.02 mL, Route: SUB-Q, Drug form: SOLN, TID-Before Meals, Dosing Weight 
97.727, kg, PRN Blood Glucose Results, Start date: 17 16:02:00 CDT, Durati
on: 30 day, Stop date: 17 16:01:00 CDT

Notes: (Same as: Humulin R) Roll in palms of hands gently;  Do not shake vigorou
sly. "single patient use only"(Restricted to patients requiring a dose >  60 
units)WASTE: F/P - Black; E - Municipal Trash Bin  Stable for 28 days at room 
temperatureExpires in ______ days from ______________Date

Start Date: 17

Stop Date: 17

Status: Discontinued



Insulin regular

8 unit, 0.08 mL, Route: SUB-Q, Drug form: SOLN, TID-Before Meals, Dosing Weight 
97.727, kg, PRN Blood Glucose Results, Start date: 17 16:02:00 CDT, Durati
on: 30 day, Stop date: 17 16:01:00 CDT

Notes: (Same as: Humulin R) Roll in palms of hands gently;  Do not shake vigorou
sly. "single patient use only"(Restricted to patients requiring a dose >  60 
units)WASTE: F/P - Black; E - Municipal Trash Bin  Stable for 28 days at room 
temperatureExpires in ______ days from ______________Date

Start Date: 17

Stop Date: 17

Status: Discontinued



Insulin regular

4 unit, 0.04 mL, Route: SUB-Q, Drug form: SOLN, TID-Before Meals, Dosing Weight 
97.727, kg, PRN Blood Glucose Results, Start date: 17 16:02:00 CDT, Durati
on: 30 day, Stop date: 17 16:01:00 CDT

Notes: (Same as: Humulin R) Roll in palms of hands gently;  Do not shake vigorou
sly. "single patient use only"(Restricted to patients requiring a dose >  60 
units)WASTE: F/P - Black; E - Municipal Trash Bin  Stable for 28 days at room 
temperatureExpires in ______ days from ______________Date

Start Date: 17

Stop Date: 17

Status: Discontinued



Insulin regular

6 unit, 0.06 mL, Route: SUB-Q, Drug form: SOLN, TID-Before Meals, Dosing Weight 
97.727, kg, PRN Blood Glucose Results, Start date: 17 16:02:00 CDT, Durati
on: 30 day, Stop date: 17 16:01:00 CDT

Notes: (Same as: Humulin R) Roll in palms of hands gently;  Do not shake vigorou
sly. "single patient use only"(Restricted to patients requiring a dose >  60 
units)WASTE: F/P - Black; E - Municipal Trash Bin  Stable for 28 days at room 
temperatureExpires in ______ days from ______________Date

Start Date: 17

Stop Date: 17

Status: Discontinued



Insulin regular

10 unit, 0.1 mL, Route: SUB-Q, Drug form: SOLN, TID-Before Meals, Dosing Weight 
97.727, kg, PRN Blood Glucose Results, Start date: 17 16:02:00 CDT, Durati
on: 30 day, Stop date: 17 16:01:00 CDT

Notes: (Same as: Humulin R) Roll in palms of hands gently;  Do not shake vigorou
sly. "single patient use only"(Restricted to patients requiring a dose >  60 
units)WASTE: F/P - Black; E - Municipal Trash Bin  Stable for 28 days at room 
temperatureExpires in ______ days from ______________Date

Start Date: 17

Stop Date: 17

Status: Discontinued



Insulin regular

6 unit, Route: SUB-Q, ONCE, Dosing Weight 97.727, kg, Start date: 17 8:01:
00 CDT, Stop date: 17 8:01:00 CDT

Start Date: 17

Stop Date: 17

Status: Completed



ketOROLAC (ANES)

IV, ONCE

Start Date: 17

Stop Date: 17

Status: Completed



ketOROLAC 30 mg/mL injectable solution

15 mg, 1 mL, Route: IV, Drug form: INJ, Q6H, Dosing Weight 97.727, kg, Start franklin
e: 17 12:00:00 CDT, Duration: 1 day, Stop date: 17 6:00:00 CDT

Notes: (Same as:Toradol)  IV bolus must be given >15 seconds. Give IM 
administration slowly and deeply into the muscle.  Not for use > 4 days.

Start Date: 17

Stop Date: 17

Status: Completed



lidocaine (ANES)

Route: IV, Drug form: INJ, ONCE, Stop date: 17 9:42:00 CDT

Start Date: 17

Stop Date: 17

Status: Completed



lidocaine 3% topical gel

1 appl, TOP

Start Date: 17

Stop Date: 17

Status: Discontinued



lisinopril

20 mg, 1 tab, Route: PO, Drug form: TAB, Daily, Dosing Weight 97.727, kg, Start 
date: 17 9:00:00 CDT, Duration: 30 day, Stop date: 17 9:00:00 CDT

Notes: (Same as: Prinivil, Zestril)

Start Date: 17

Stop Date: 17

Status: Canceled



lisinopril 20 mg oral tablet

20 mg = 1 tab, PO, Daily, 0 Refill(s)

Start Date: 17

Status: Ordered



LORazepam

1 mg, 1 tab, Route: PO, Drug form: TAB, PRN, Dosing Weight 97.727, kg, PRN as ne
eded for anxiety, Start date: 17 8:39:00 CDT, Duration: 30 day, Stop date:
17 8:38:00 CDT

Notes: (Same as: Ativan)

Start Date: 17

Stop Date: 17

Status: Discontinued



LORazepam 1 mg oral tablet

1 mg = 1 tab, PO, PRN

Start Date: 17

Status: Ordered



LR 1000 mL INJ (ANES)

Route: IV, Total Volume: 1,000, Start date: 17 10:08:00 CDT, Stop date:  11:08:00 CDT

Start Date: 17

Stop Date: 17

Status: Completed



magnesium oxide

400 mg, 1 tab, Route: PO, Drug form: TAB, Daily, Dosing Weight 97.727, kg, Start
date: 17 19:14:00 CDT, Duration: 3 day, Stop date: 17 9:00:00 CDT

Notes: (Same as: Mag-Ox 400)Magnesium oxide [id number] elemental magnesiumDose=
____mg magnesium oxide (___mg elemental magnesium)

Start Date: 17

Stop Date: 17

Status: Discontinued



metFORMIN 500 mg oral tablet

500 mg = 1 tab, PO, BID

Start Date: 17

Status: Ordered



metFORMIN 500 mg oral tablet

500 mg, 1 tab, Route: PO, Drug form: TAB, BID, Dosing Weight 97.727, kg, Start d
ate: 17 9:00:00 CDT, Duration: 30 day, Stop date: 17 17:00:00 CDT

Notes: (Same as: Glucophage)  Take with meal

Start Date: 17

Stop Date: 17

Status: Discontinued



midazolam (ANES)

Route: IV, Drug form: SOLN, ONCE, Stop date: 17 9:37:00 CDT

Start Date: 17

Stop Date: 17

Status: Completed



morphine Sulfate

2 mg, 1 mL, Route: IVP, Drug form: INJ, Q4H, Dosing Weight 97.727, kg, PRN Pain 
Score 4-6, Start date: 17 8:42:00 CDT, Duration: 30 day, Stop date:  8:41:00 CDT

Notes: (Same as:MORPhine Sulfate)

Start Date: 17

Stop Date: 17

Status: Discontinued



morphine Sulfate

4 mg, 1 mL, Route: IVP, Drug form: INJ, Q4H, Dosing Weight 97.727, kg, PRN Pain 
Score 7-10, Start date: 17 8:42:00 CDT, Duration: 30 day, Stop date:  8:41:00 CDT

Notes: (Same as:MORPhine Sulfate)

Start Date: 17

Stop Date: 17

Status: Discontinued



neostigmine (ANES)

Route: IV, Drug form: INJ, ONCE, Stop date: 17 10:53:00 CDT

Start Date: 17

Stop Date: 17

Status: Completed



Norco 10/325 oral tablet

1 tab, Route: PO, Drug Form: TAB, Dosing Weight 98.182, kg, ONCE, STAT, Start da
te: 17 7:03:00 CDT, Stop date: 17 7:03:00 CDT

Notes: Do not exceed 4gm/day of acetaminophen.  (Same as: Norco 325/10)

Start Date: 17

Stop Date: 17

Status: Completed



ondansetron (ANES)

Route: IV, Drug form: INJ, ONCE, Stop date: 17 10:53:00 CDT

Start Date: 17

Stop Date: 17

Status: Completed



oxyCODONE 5 mg immediate release

5 mg, 1 tab, Route: PO, Drug form: TAB, Q6H, Dosing Weight 97.727, kg, PRN Pain 
Score 4-6, Start date: 17 13:30:00 CDT, Duration: 30 day, Stop date:  13:29:00 CDT

Notes: (Same as: Roxicodone)

Start Date: 17

Stop Date: 17

Status: Discontinued



oxyCODONE 5 mg immediate release

10 mg, 2 tab, Route: PO, Drug form: TAB, Q6H, Dosing Weight 97.727, kg, PRN Pain
Score 7-10, Start date: 17 13:30:00 CDT, Duration: 30 day, Stop date:  13:29:00 CDT

Notes: (Same as: Roxicodone)

Start Date: 17

Stop Date: 17

Status: Discontinued



oxyCONTIN

40 mg, 2 tab, Route: PO, Drug form: ERTAB, X86Tdmr, Dosing Weight 97.727, kg, St
art date: 17 14:00:00 CDT, Duration: 30 day, Stop date: 17 2:00:00 C
DT

Notes: Do not crush or chew.(Same as: OxyContin)

Start Date: 17

Stop Date: 17

Status: Discontinued



oxyCONTIN

40 mg, 2 tab, Route: PO, Drug form: ERTAB, Q12H, Dosing Weight 97.727, kg, Start
date: 17 9:00:00 CDT, Duration: 30 day, Stop date: 17 21:00:00 CDT

Start Date: 17

Stop Date: 17

Status: Discontinued



oxyCONTIN 40 mg oral tablet, extended release

40 mg = 1 tab, PO, Q12H

Start Date: 17

Status: Ordered



Phenergan

12.5 mg, 0.5 mL, Route: IVPB, Drug form: INJ, Q4H, Dosing Weight 97.727, kg, PRN
Nausea & Vomiting, Start date: 17 8:42:00 CDT, Duration: 30 day, Stop 
date: 17 8:41:00 CDT

Notes: Do not give IV push.  (Same as: Phenergan)

Start Date: 17

Stop Date: 17

Status: Discontinued



phenylephrine (ANES)

Route: IV, Drug form: INJ, ONCE, Stop date: 17 10:07:00 CDT

Start Date: 17

Stop Date: 17

Status: Completed



pneumococcal 23-valent vaccine

0.5 mL, Route: IM, Drug Form: INJ, Daily, Start date: 17 9:00:00 CDT, Dura
tion: 1 doses or times, Stop date: 17 9:00:00 CDT

Notes: (Same as: Pneumovax 23)  Refrigerate

Start Date: 17

Stop Date: 17

Status: Completed



propofol (ANES)

Route: IV, Drug form: INJ, ONCE, Stop date: 17 9:42:00 CDT

Start Date: 17

Stop Date: 17

Status: Completed



Protonix

40 mg, Route: IVP, Drug form: INJ, Daily, Dosing Weight 97.727, kg, Start date: 
17 9:00:00 CDT, Duration: 30 day, Stop date: 17 9:00:00 CDT

Notes: For IV push reconstitute with 10 ml 0.9% sodium chloride and push over 2 
minutes. (Same as: Protonix)

Start Date: 17

Stop Date: 17

Status: Discontinued



ReliOn/NovoLIN 70/30

30 unit, Route: SUB-Q, Daily, Dosing Weight 97.727, kg, Start date: 17 9:0
0:00 CDT, Duration: 30 day, Stop date: 17 9:00:00 CDT

Start Date: 17

Stop Date: 17

Status: Discontinued



ReliOn/NovoLIN 70/30

30 unit, SUB-Q, Daily

Start Date: 17

Stop Date: 17

Status: Discontinued



ReliOn/NovoLIN R

10 unit, Route: SUB-Q, Drug form: SOLN, PRN, Dosing Weight 97.727, kg, PRN Blood
Glucose Results, Start date: 17 8:39:00 CDT, Duration: 30 day, Stop date: 
17 8:38:00 CDT

Start Date: 17

Stop Date: 17

Status: Discontinued



ReliOn/NovoLIN R 100 units/mL injectable solution

10 unit, SUB-Q, PRN

Start Date: 17

Stop Date: 17

Status: Discontinued



rocuronium (ANES)

Route: IV, Drug form: INJ, ONCE, Stop date: 17 9:42:00 CDT

Start Date: 17

Stop Date: 17

Status: Completed



senna

8.6 mg, 1 tab, Route: PO, Drug Form: TAB, Dosing Weight 97.727, kg, Daily, PRN C
onstipation, Start date: 17 8:42:00 CDT, Duration: 30 day, Stop date:  8:41:00 CDT

Notes: (Same as: Senokot)

Start Date: 17

Stop Date: 17

Status: Discontinued



sodium chloride 0.9% 100 ml INJ (ANES) + ketAMINE (ANES) (ANES)

Route: IV, Drug form: INJ, Start date: 17 9:00:00 CDT, Stop date: 17
10:00:00 CDT

Start Date: 17

Stop Date: 17

Status: Completed



sodium chloride 0.9% 100 ml INJ (ANES) + lidocaine (ANES) (ANES)

Route: IV, Drug form: INJ, Start date: 17 9:00:00 CDT, Stop date: 17
10:00:00 CDT

Start Date: 17

Stop Date: 17

Status: Completed



sodium chloride 0.9% 100 ml INJ (ANES) + magnesium sulfate (ANES) (ANES)

Route: IV, Drug form: INJ, Start date: 17 9:00:00 CDT, Stop date: 17
10:00:00 CDT

Start Date: 17

Stop Date: 17

Status: Completed



sodium chloride 0.9% 1000 ml INJ 1,000 mL

1,000 mL, Rate: 75 ml/hr, Infuse over: 13.3 hr, Route: IV, Dosing Weight 97.727 
kg, Total Volume: 1,000, Start date: 17 8:42:00 CDT, Duration: 30 day, Sto
p date: 17 8:41:00 CDT

Start Date: 17

Stop Date: 17

Status: Discontinued



SUFentanil (ANES) (ANES)

Route: IV, Drug form: INJ, Start date: 17 9:00:00 CDT, Stop date: 17
10:00:00 CDT

Start Date: 17

Stop Date: 17

Status: Completed



tizanidine

4 mg, 1 tab, Route: PO, Drug form: TAB, Q8H, Dosing Weight 97.727, kg, PRN as ne
eded for muscle spasm, Start date: 17 8:42:00 CDT, Duration: 30 day, Stop 
date: 17 8:41:00 CDT

Notes: (Same As: Zanaflex)

Start Date: 17

Stop Date: 17

Status: Discontinued



tizanidine 4 mg oral capsule

4 mg = 1 cap, PO, TID, As needed

Start Date: 17

Status: Ordered



tramadol 100 mg oral tablet, extended release

100 mg, 2 tab, Route: PO, Drug form: TAB, ONCE, Start date: 17 11:05:00 CD
T, Stop date: 17 11:05:00 CDT

Notes: Not to exceed 400mg/day. (Same As: Ultram)

Start Date: 17

Stop Date: 17

Status: Completed



tramadol 50 mg oral tablet

50 mg, Route: PO, Drug form: TAB, Q6H, Dosing Weight 97.727, kg, PRN Pain Score 
1-3, Start date: 17 11:01:00 CDT, Duration: 30 day, Stop date: 17 11
:00:00 CDT

Start Date: 17

Stop Date: 17

Status: Discontinued



tramadol 50 mg oral tablet

50 mg, 1 tab, Route: PO, Drug form: TAB, Q4H, Dosing Weight 97.727, kg, PRN Pain
Score 1-3, Start date: 17 8:42:00 CDT, Duration: 30 day, Stop date:  8:41:00 CDT

Notes: Not to exceed 400mg/day. (Same As: Ultram)

Start Date: 17

Stop Date: 17

Status: Discontinued



Zofran

4 mg, 2 mL, Route: IV, Drug form: INJ, Q4H, Dosing Weight 97.727, kg, PRN Nausea
, Start date: 17 8:42:00 CDT, Duration: 30 day, Stop date: 17 8:41:0
0 CDT

Notes: (Same as: Zofran)  *** MEDICATION WASTE ***Product Size:  4 mgProduct Was
annie:  ___ mg

Start Date: 17

Stop Date: 17

Status: Discontinued



Results





ELECTROLYTES



  



                     Most recent to      1                   2



                                         oldest [Reference  



                                         Range]:  

 

  



                     Sodium Lvl [135-145  145 mEq/L           138 mEq/L



                     mEq/L]              (17 4:43 AM)    (17 4:05 PM)

 

  



                     Potassium Lvl       3.7 mEq/L           4.1 mEq/L



                     [3.5-5.1 mEq/L]     (17 4:43 AM)    (17 4:05 PM)

 

  



                     Chloride Lvl [  118 mEq/L           101 mEq/L



                     mEq/L]              *HI*                (17 4:05 PM)



                                         (17 4:43 AM) 

 

  



                     CO2 [24-32 mEq/L]   21 mEq/L            31 mEq/L



                           *LOW*                     (17 4:05 PM)



                                         (17 4:43 AM) 

 

  



                     AGAP [10.0-20.0     9.7 mEq/L           10.1 mEq/L



                     mEq/L]              *LOW*               (17 4:05 PM)



                                         (17 4:43 AM) 







CHEM PANEL



  



                     Most recent to      1                   2



                                         oldest [Reference  



                                         Range]:  

 

  



                     Creatinine Lvl      0.43 mg/dL          0.76 mg/dL



                     [0.50-1.40 mg/dL]   *LOW*               (17 4:05 PM)



                                         (17 4:43 AM) 

 

  



                     eGFR                137 mL/min/1.73m2 1  109 mL/min/1.73m2 

2



                           *NA*                      *NA*



                           (17 4:43 AM)          (17 4:05 PM)

 

  



                     BUN [7-22 mg/dL]    12 mg/dL            13 mg/dL



                           (17 4:43 AM)          (17 4:05 PM)

 

  



                     Glucose Lvl [70-99  205 mg/dL           95 mg/dL



                     mg/dL]              *HI*                (17 4:05 PM)



                                         (17 4:43 AM) 

 

  



                     Calcium Lvl         6.0 mg/dL 3         8.8 mg/dL



                     [8.5-10.5 mg/dL]    *CRIT*              (17 4:05 PM)



                                         (17 4:43 AM) 

 

  



                           Magnesium Lvl             2.1 mg/dL 



                           [1.8-2.4 mg/dL]           (17 2:22 PM) 







1Result Comment: The eGFR is calculated using the CKD-EPI formula. In most 
young, healthy individuals the eGFR will be >90 mL/min/1.73m2. The eGFR declines
with age. An eGFR of 60-89 may be normal in some populations, particularly the 
elderly, for whom the CKD-EPI formula has not been extensively validated. Use of
the eGFR is not recommended in the following populations:



Individuals with unstable creatinine concentrations, including pregnant patients
and those with serious co-morbid conditions.



Patients with extremes in muscle mass or diet. 



The data above are obtained from the National Kidney Disease Education Program (
NKDEP) which additionally recommends that when the eGFR is used in patients with
extremes of body mass index for purposes of drug dosing, the eGFR should be mul
tiplied by the estimated BMI.



2Result Comment: The eGFR is calculated using the CKD-EPI formula. In most 
young, healthy individuals the eGFR will be >90 mL/min/1.73m2. The eGFR declines
with age. An eGFR of 60-89 may be normal in some populations, particularly the 
elderly, for whom the CKD-EPI formula has not been extensively validated. Use of
the eGFR is not recommended in the following populations:



Individuals with unstable creatinine concentrations, including pregnant patients
and those with serious co-morbid conditions.



Patients with extremes in muscle mass or diet. 



The data above are obtained from the National Kidney Disease Education Program (
NKDEP) which additionally recommends that when the eGFR is used in patients with
extremes of body mass index for purposes of drug dosing, the eGFR should be mul
tiplied by the estimated BMI.



3Result Comment: Critical Result(s) called to Sveta Ruth at 2017 06:05 
byJw.  Read back OK.



LIPIDS



  



                     Most recent to      1                   2



                                         oldest [Reference  



                                         Range]:  

 

  



                           CHD Risk [4.00-7.30]      3.37 



                                         *LOW* 



                                         (17 4:43 AM) 

 

  



                           Chol [<=199 mg/dL]        118 mg/dL 



                                         (17 4:43 AM) 

 

  



                           Trig [<=149 mg/dL]        86 mg/dL 



                                         (17 4:43 AM) 

 

  



                           HDL [>=61 mg/dL]          35 mg/dL 



                                         *LOW* 



                                         (17 4:43 AM) 

 

  



                           LDL (Calculated)          66 mg/dL 



                           [<=99 mg/dL]              (17 4:43 AM) 

 

  



                           VLDL                      17 



                                         *NA* 



                                         (17 4:43 AM) 







SPECIAL CHEMISTRY



  



                     Most recent to      1                   2



                                         oldest [Reference  



                                         Range]:  

 

  



                           Hgb A1C [<=5.6 %]         9.4 % 



                                         *HI* 



                                         (17 4:05 PM) 







HEMATOLOGY



  



                     Most recent to      1                   2



                                         oldest [Reference  



                                         Range]:  

 

  



                           WBC [3.7-10.4 K/CMM]      6.5 K/CMM 



                                         (17 4:05 PM) 

 

  



                           RBC [4.70-6.10            4.32 M/CMM 



                           M/CMM]                    *LOW* 



                                         (17 4:05 PM) 

 

  



                     Hgb [14.0-18.0 g/dL]  8.2 g/dL            13.1 g/dL



                           *LOW*                     *LOW*



                           (17 4:43 AM)          (17 4:05 PM)

 

  



                     Hct [42.0-54.0 %]   23.8 %              38.3 %



                           *LOW*                     *LOW*



                           (17 4:43 AM)          (17 4:05 PM)

 

  



                           MCV [80.0-94.0 fL]        88.6 fL 



                                         (17 4:05 PM) 

 

  



                           MCH [27.0-31.0 pg]        30.4 pg 



                                         (17 4:05 PM) 

 

  



                           MCHC [32.0-36.0           34.3 g/dL 



                           g/dL]                     (17 4:05 PM) 

 

  



                           RDW [11.5-14.5 %]         13.4 % 



                                         (17 4:05 PM) 

 

  



                           Platelet [133-450         205 K/CMM 



                           K/CMM]                    (17 4:05 PM) 

 

  



                           MPV [7.4-10.4 fL]         9.2 fL 



                                         (17 4:05 PM) 

 

  



                           Segs [45.0-75.0 %]        55.7 % 



                                         (17 4:05 PM) 

 

  



                           Lymphocytes               35.1 % 



                           [20.0-40.0 %]             (17 4:05 PM) 

 

  



                           Monocytes [2.0-12.0       7.0 % 



                           %]                        (17 4:05 PM) 

 

  



                           Eosinophils [0.0-4.0      1.6 % 



                           %]                        (17 4:05 PM) 

 

  



                           Basophils [0.0-1.0        0.6 % 



                           %]                        (17 4:05 PM) 

 

  



                           Segs-Bands #              3.6 K/CMM 



                           [1.5-8.1 K/CMM]           (17 4:05 PM) 

 

  



                           Lymphocytes #             2.3 K/CMM 



                           [1.0-5.5 K/CMM]           (17 4:05 PM) 

 

  



                           Monocytes # [0.0-0.8      0.5 K/CMM 



                           K/CMM]                    (17 4:05 PM) 

 

  



                           Eosinophils #             0.1 K/CMM 



                           [0.0-0.5 K/CMM]           (17 4:05 PM) 







Immunizations





Given and Recorded



   



                 Vaccine         Date            Status          Refusal Reason

 

   



                     pneumococcal 23-valent vaccine  17              Given 







Procedures





   



                 Procedure       Date            Related Diagnosis  Body Site

 

   



                                         Gastric bypass   

 

   



                                         Operation   

 

   



                                         Operation   

 

   



                                         Tonsillectomy and adenoidectomy   







Social History





 



                           Social History Type       Response

 

 



                           Smoking Status            Never smoker; Exposure to T

obacco Smoke None; Cigarette Smoking

Last 365



                                         Days No; Reg Smoking Cessation Counseli

ng No







Assessment and Plan

Extracted from:



  



                     Title: Clinical Document  Author: Matt Valdes PA-C

e: 17







                                        Discharge Summary







Patient Name: Shanna Crump

FIN: [id number]

Admission Date: 17

Discharge Date: 17

Attending Physician: Jose Watt DO/NAYA

Diagnosis:  cervical spinal stenosis

HPI/Hospital Course: Patient admitted for an elective posterior cervical 
decompression.  Surgery was without complication.  Pain management and 
hospitalist followed patient throughout admission.  POD#1 he met all discharge 
criteria and was discharged to home.

Procedures: 17- Dr. Watt- bilateral posterior C3-C7 decompression

Discharge Medications: see med rec

Discharge Condition: good

Patient/Family Discharge Instructions: discharged to home on carb controlled 
diet with written activity and wound care instructions.

Follow up Appointments: Dr. Watt- 2 weeks





Extracted from:



  



                     Title: Clinical Document  Author: Matt Valdes PA-C

e: 17







                                        NSGY

POD#1 posterior cervical decompression



doing well.  pain controlled.  ready for discharge.



strength 5/5 throughout

incision c/d/i



d/c drain

d/c home





Extracted from:



  



                     Title: [facility] Consult Note  Author: Carroll Hodgson DO  Date:

 17









Patient:   SHANNA CRUMP JR            MRN: [id number]            FIN: 
[id number]

Age:   47 years     Sex:  Male     :  1970

Associated Diagnoses:   None

Author:   Carroll Hodgson DO



Basic Information

Referral source:  Jose Watt DO.

Reason for consultation: Complex Acute Pain



Chief Complaint

post op pain control



History of Present Illness

          The patient presents with Location: cervical area

Quality: sharp, throbbing

Severity: 9/10

Timing: since surgery

Modifying factors: pain med with mild relief, moving makes it worse

 .

                                        48 yo M with pmhx of chronic cervical sp

inal stenosis from multiple previous 

traumas, chronic low back pain, DM, depression, NOEMI who received B/L C3-C7 
laminotomy on . [facility] has been consulted for post op acute pain control. The 
patient reports that the medications he has received post operatively in PACU 
have not been helping him to alleviate his pain. The pain is described as sharp 
and throbbing and graded 9/10. Due to his chronic spine pain, he takes oxycontin
40mg bid.





Histories

Past Medical History:

Active

Diabetes mellitus (960943285)

Back pain (1627355528)

Cervical stenosis of spine (328425019)

Anxiety ([id number])

Depression (9020771414)

NOEMI (obstructive sleep apnea) ([id number])

Family History:

Cancer

Grandparent

Lung

Mother

Type 2 diabetes mellitus

Grandparent



Procedure history:

Operation ([id number]).

Gastric bypass ([id number]).

Operation ([id number]).

Tonsillectomy and adenoidectomy ([id number]).

Social History



Social & Psychosocial Habits



Tobacco

                                        2017  Use: Never smoker

  Exposure to Tobacco Smoke None

  Cigarette Smoking Last 365 Days No

  Reg Smoking Cessation Counseling No

                                        .



Health Status

Allergies:

Allergic Reactions (Selected)

Severity Not Documented

NKDA- No reactions were documented.,

Allergies (1) ActiveReaction

NKDANone Documented



Current medications:  (Selected)

Inpatient Medications

Ordered

Ambien: 10 mg, 2 tab, PO, Bedtime, PRN: Insomnia

Cepacol Sore Throat Cherry Sugar Free 15 mg-3.6 mg mucous membrane lozenge: 1 
lozenge, PO, Q2H, PRN: as needed for sore throat

Colace 100 mg oral capsule: 100 mg, 1 cap, PO, BID

Dextrose 50% Syringe: 12.5 gm, 25 mL, IVP, PRN, PRN: Blood Glucose Results

Dextrose 50% Syringe: 25 gm, 50 mL, IVP, PRN, PRN: Blood Glucose Results

Insulin regular: 10 unit, 0.1 mL, SUB-Q, TID-Before Meals, PRN: Blood Glucose 
Results

Insulin regular: 2 unit, 0.02 mL, SUB-Q, TID-Before Meals, PRN: Blood Glucose 
Results

Insulin regular: 4 unit, 0.04 mL, SUB-Q, TID-Before Meals, PRN: Blood Glucose 
Results

Insulin regular: 6 unit, 0.06 mL, SUB-Q, TID-Before Meals, PRN: Blood Glucose 
Results

Insulin regular: 8 unit, 0.08 mL, SUB-Q, TID-Before Meals, PRN: Blood Glucose 
Results

LORazepam: 1 mg, 1 tab, PO, PRN, PRN: as needed for anxiety

Phenergan: 12.5 mg, 0.5 mL, IVPB, Q4H, PRN: Nausea & Vomiting

Protonix: 40 mg, IVP, Daily

Zofran: 4 mg, 2 mL, IV, Q4H, PRN: Nausea

acetaminophen: 1,000 mg, 2 tab, PO, Q6Hnow

ceFAZolin (SCIP) + sodium chloride 0.9%  mL: 2 gm, 200 ml/hr, IVPB, 
ABXQ8H

dexamethasone: 4 mg, 1 mL, IVP, Q12H

diphenhydrAMINE: 25 mg, 1 cap, PO, TID, PRN: Itching

escitalopram: 20 mg, 2 tab, PO, Daily

gabapentin: 600 mg, 2 cap, PO, Q8Hnow

glucagon: 1 mg, IM, PRN, PRN: Blood Glucose Results

insulin isophane-insulin regular 70/30: 20 unit, 0.2 mL, SUB-Q, QAM & PM

ketOROLAC 30 mg/mL injectable solution: 15 mg, 1 mL, IV, Q6H

magnesium oxide: 400 mg, 1 tab, PO, Daily

metFORMIN 500 mg oral tablet: 500 mg, 1 tab, PO, BID

oxyCODONE 5 mg immediate release: 10 mg, 2 tab, PO, Q6H, PRN: Pain Score 7-10

oxyCODONE 5 mg immediate release: 5 mg, 1 tab, PO, Q6H, PRN: Pain Score 4-6

oxyCONTIN: 40 mg, 2 tab, PO, [id number]

pneumococcal 23-valent vaccine: 0.5 mL, IM, Daily

senna: 8.6 mg, 1 tab, PO, Daily, PRN: Constipation

sodium chloride 0.9% 1000 ml INJ 1,000 mL: 75 ml/hr, IV, Stop: 17 8:41:00 
CDT

tizanidine: 4 mg, 1 tab, PO, Q8H, PRN: as needed for muscle spasm

tramadol 50 mg oral tablet: 50 mg, 1 tab, PO, Q4H, PRN: Pain Score 1-3

Documented Medications

Suspended

Calcium 600 +D oral tablet: 1 tab, PO, Daily

LORazepam 1 mg oral tablet: 1 mg, 1 tab, PO, PRN

Norco 10/325 oral tablet: 1 tab, PO, TID, PRN: Pain, 0 Refill(s)

diclofenac topical 1% gel: TOP

escitalopram 20 mg oral tablet: 20 mg, 1 tab, PO, Daily

gabapentin 600 mg oral tablet: 600 mg, 1 tab, PO, BID

lidocaine 3% topical gel: 1 appl, TOP

metFORMIN 500 mg oral tablet: 500 mg, 1 tab, PO, BID

multivitamin: 1 tab, PO, Daily

oxyCONTIN 40 mg oral tablet, extended release: 40 mg, 1 tab, PO, Q12H

tizanidine 4 mg oral capsule: 4 mg, 1 cap, PO, TID, As needed,

Medications (33) Active

Scheduled: (13)

acetaminophen 500 mg TAB  1,000 mg 2 tab, PO, Q6Hnow

ceFAZolin 1 gm VL INJ + sodium chloride 0.9%  mL  2 gm, IVPB, ABXQ8H

dexamethasone 4mg/ml INJ  4 mg 1 mL, IVP, Q12H

docusate sodium 100 mg CAP  100 mg 1 cap, PO, BID

escitalopram 10 mg TAB  20 mg 2 tab, PO, Daily

gabapentin 300 mg CAP  600 mg 2 cap, PO, Q8Hnow

insulin NPH-REG (mix) 70/30 INJ 10ml  20 unit 0.2 mL, SUB-Q, QAM & PM

ketOROLAC 15 mg/1 ml INJ VL  15 mg 1 mL, IV, Q6H

magnesium oxide (242 mg elemental) tab  400 mg 1 tab, PO, Daily

metFORMIN 500 mg TAB  500 mg 1 tab, PO, BID

oxyCODONE 20 mg ERT  40 mg 2 tab, PO, [id number]

pantoprazole 40 mg INJ  40 mg, IVP, Daily

pneumococcal 23-valent vaccine  0.5 mL, IM, Daily

Continuous: (1)

sodium chloride 0.9% 1000 ml INJ 1,000 mL  1,000 mL, IV, 75 ml/hr

PRN: (19)

benzocaine-menthol 15 mg-3.6 mg Lozenges 16's  1 lozenge, PO, Q2H

Dextrose 50% 50 ml INJ syringe  12.5 gm 25 mL, IVP, PRN

Dextrose 50% 50 ml INJ syringe  25 gm 50 mL, IVP, PRN

diphenhydrAMINE 25 mg CAP  25 mg 1 cap, PO, TID

glucagon recombinant 1 mg PDR  1 mg, IM, PRN

insulin reg human rec 100 unit/ml INJ 3 ml Vial  2 unit 0.02 mL, SUB-Q, TID-
Before Meals

insulin reg human rec 100 unit/ml INJ 3 ml Vial  4 unit 0.04 mL, SUB-Q, TID-
Before Meals

insulin reg human rec 100 unit/ml INJ 3 ml Vial  6 unit 0.06 mL, SUB-Q, TID-
Before Meals

insulin reg human rec 100 unit/ml INJ 3 ml Vial  8 unit 0.08 mL, SUB-Q, TID-
Before Meals

insulin reg human rec 100 unit/ml INJ 3 ml Vial  10 unit 0.1 mL, SUB-Q, TID-
Before Meals

LORazepam 1 mg TAB  1 mg 1 tab, PO, PRN

ondansetron 4 mg/2ml INJ VL  4 mg 2 mL, IV, Q4H

oxyCODONE IR 5 mg TAB  5 mg 1 tab, PO, Q6H

oxyCODONE IR 5 mg TAB  10 mg 2 tab, PO, Q6H

promethazine 25 mg/1 ml INJ  12.5 mg 0.5 mL, IVPB, Q4H

senna 8.6 mg TAB  8.6 mg 1 tab, PO, Daily

tizanidine 4 mg TAB  4 mg 1 tab, PO, Q8H

traMADol 50 mg TAB  50 mg 1 tab, PO, Q4H

zolpidem 5 mg TAB  10 mg 2 tab, PO, Bedtime



Problem list:

Active Problems (7)

Anxiety 

Back pain 

Cervical stenosis of spine 

Depression 

Diabetes mellitus 

NOEMI (obstructive sleep apnea) 

Pain 





Review of Systems

Constitutional:  Negative.

Respiratory:  Negative.

Cardiovascular:  Negative.

Abdomen: Negative

Musculoskeletal: back pain, muscle pain

Neurologic:  Negative.

All other systems are negative





Physical Examination

VS/Measurements

Measurements from flowsheet : Measurements

                                        2017 07:18

Heparin Dosing Weight (kg)

                                        82.89

                                        2017 07:18

Height

                                        177.8 cm



Height Collection Method

Stated



Weight

                                        97.727 kg



Dosing Weight Difference Percent

                                        -0.463 %



Dosing Weight Collection Method

Estimated



Body Surface Area

                                        2.197 m2



Body Mass Index

                                        30.91 m2



,

Vital Signs (last 24 hrs)_____Last Charted___________

Temp Oral97.6 DegF  (:)

Heart Rate Ojtkknthkx49 bpm  ()

Resp Rate    16 BRMIN  ()

SBPH 147mmHg  ()

DBP87 mmHg  (:)

UyF233 %  ()

Wpekdh48.727 kg  ()

Upewqz517.8 cm  ()

BMI30.91  ()



PE

General:  Alert and oriented, moderate distress with pain

Respiratory:  Respirations are non-labored.

Cardiovascular:  RRR

Abdomen: soft, ND, NT

Integumentary:  Warm.

Neurologic:  Alert

Cognition and Speech:  Oriented.

Psychiatric:  Cooperative.





Review / Management

Results review:

Labs (Last four charted values)

WBC                 6.5()

Hgb                 L 13.1()

Hct                 L 38.3()

Plt                 205()

Na                  138()

K                   4.1()

CO2                 31()

Cl                  101()

Cr                  0.76()

BUN                 13()

Glucose Random      95()

Mg                  2.1()

Ca                  8.8().



Impression and Plan

Diagnosis

Orders



                                        48 yo M with pmhx of chronic cervical sp

inal stenosis from multiple previous 

traumas, chronic low back pain, DM, depression, NOEMI who received B/L C3-C7 
laminotomy on . [facility] has been consulted for post op acute pain control.



                                        - Resuming his home med of oxycontin 40m

g q12h

                                        - Adding oxycodone 5/10mg q6h prn for br

eak-through

                                        - Adding gabapentin 600mg q8h and 1g ace

taminophone q6h

                                        - Mg level at 2.1. adding Magnesium oxid

e 400mg qD for next 3 days

                                        - Thank you for the opportunity to parti

cipate in this patient's care. [facility] will

continue to follow



Education and Follow-up:       Counseled: Patient, Regarding treatment, 
Regarding medications.



Approximate patient care time: 45 min



Greater than 50% of this time was spent on counselling and coordination of care 
on the matters mentioned above in assessment and recommendations







 



                           Addendum                  TEACHING PHYSICIAN ADDENDUM

: I saw and personally examined this 

patient and  discussed the



                           by                        plan of care with this resi

dent. I have reviewed the note below and agree 

with the



                           [place],                history, examination findin

gs and the plan of care.



                                         Toni GONZALEZ MD 



                                         on 



                                         2017 



                                         13:37 





Extracted from:



  



                     Title: Surgical procedure note  Author: Jose Watt DO  Date: 17







                                        Procedure:

                                        1) Bilateral C3-C4 laminotomy, partial f

acetectomy, with facet undercutting and 

C4 root foraminotomies (11041-93)

                                        2) Bilateral C4-C5 laminotomy, partial f

ectectomy, with facet undercutting and 

C5 root foraminotomies (29545-98)

                                        3) Bilateral C5-C6 laminotomy, partial f

acetectomy, with facet undercutting and 

C6 root foraminotomies (79193-82)

                                        4) Bilateral C6-C7 laminotomy, partial f

acetectomy, with facet undercutting and 

C7 root foraminotomies (61958-00)







Preop Diagnosis:



                                        1) Spinal stenosis, cervical region (M48

.02)

                                        2) Other cervical disc degeneration, hig

h cervical region (M50.31)

                                        3) Other cervical disc degeneration, C4-

C5 interspace level (M50.321)

                                        4) Other cervical disc degeneration, C5-

C6 interspace level (M50.322)

                                        5) Other cervical disc degeneration, C6-

C7 interspace level (M50.323)

                                        6) Other spondylosis with myelopathy, ce

rvical region  (M47.12)



Postop Diagnosis:



                                        1) Spinal stenosis, cervical region (M48

.02)

                                        2) Other cervical disc degeneration, hig

h cervical region (M50.31)

                                        3) Other cervical disc degeneration, C4-

C5 interspace level (M50.321)

                                        4) Other cervical disc degeneration, C5-

C6 interspace level (M50.322)

                                        5) Other cervical disc degeneration, C6-

C7 interspace level (M50.323)

                                        6) Other spondylosis with myelopathy, ce

rvical region  (M47.12)



Surgeon:

Jose Watt DO, FACOS



Assistant:

Matt Valdes PA-C



EBL:

                                        150 cc



Blood given:

None



Drain:

                                        10 French Hemovac drain placed



Complications:

None



Dosposition:

Recovery in stable condition



Procedure:



Following obtainment of informed consent the following procedure was performed. 
The patient was brought to the operating room on the transport cart in the 
supine position.  Smooth induction of anesthesia was achieved with adequate 
venous line access secured.  The patient was then turned prone upon a Alfie 
frame with the arms tucked and appropriate points padded.



                                        10 Minute Betadine soap solution scrub p

rep was carried out directed at the 

posterior cervical region.  Sterile draping was then applied.  A midline 
incision was made centered from C3 thru C7 posteriorly.  Elevation of the fascia
and musculature tissues off the osseus spine was carried out to expose the 
spinous processes and laminae of C3 thru C7 bilaterally.



Micro drill in combination with 1 mm., 2 mm., and 3 mm. Kerrison punches were 
used to create laminotomies beginning on the left side.  Facet undercutting 
along with expansion of the foramina using micro curette to complete 
foraminotomies were carried on.  Confirmation of root freedom was achieved using
micro nerve hook probe.  Hemostasis was achieved.



With this completed on the left side, in a similar fashion, laminotomies, 
partial facetectomies, and foraminotomies were achieved in sequence on the right
side.  Again, confirmation of root freedom was achieved on the right side as 
well.  Hemostasis was achieved and confirmed.



Copious amounts of antibiotic irrigation was used within the wound.  Hemostasis 
was confirmed.  A 10 French Hemovac Drain was placed deep within the wound and 
brought out through a separate stab point.



The wound was closed using 0 Neurolon on the deep fascial layers in an 
interrupted fashion.  2-0 Vicryl was used on the superficial fascial layers and 
staples used on the skin surface.



The final sponge and needles counts were noted to be correct times two.  There 
were no identified intraoperative anesthetic or surgical complications.



Jose Watt DO, FACOS

## 2020-05-16 NOTE — XMS REPORT
Patient Summary Document

                             Created on: 2020



SHANNA CRUMP JR

External Reference #: [id number]

: 1970

Sex: Male



Demographics





                          Address                   [address] 

[place]  [address]

 

                          Home Phone                [phone]

 

                          Preferred Language        English

 

                          Marital Status            Unknown

 

                          Mormonism Affiliation     Unknown

 

                          Race                      Unknown

 

                                        Additional Race(s) 

White

White/





 

                          Ethnic Group              Non-





Author





                          Author                    [facility]

t

 

                          Organization              [facility]

 

                          Address                   [address]

[place]  [address]



 

                          Phone                     Unavailable







Support





                Name            Relationship    Address         Phone

 

                    SHANNA CRUMP      PRS                 UNKNOWN

[place], [place]  [id number] [phone]

 

                    AFSANEH CRUMP       ECON                [address] [facility]

[id number]

[place], [place]  [id number] [phone]







Care Team Providers





                    Care Team Member Name Role                Phone

 

                    Jayson Caal DO PCP                 [phone]

 

                    Nehemias MULTANI,  Radames Attphys             [phone]

 

                    Hampel,  Nehemia    Attphys             [phone]

 

                    HAMPEL,  NEHEMIA    Attphys             Unavailable

 

                    HAMPEL,  PEDRO        Attphys             Unavailable

 

                    Keagan Watt Attphys             [phone]

 

                    Keagan Watt Admphys             [phone]







Payers





           Payer Name Policy Type Policy Number Effective Date Expiration Date S

mervin

 

                    MEDICAREMEDICARE PART A AND [id number]

[facility] [place]                     

xxxxxxxxxxx         2014 00:00:00                     Abdi Palmer

 

                    BCBSBCBS CHOICE PPO/FEDERAL EMPL PPOxxxxxxxxxxxx1/2018-Pre

sentPPO                     

xxxxxxxxxxxx        2018 00:00:00                     Abdi Palmer







Problems

This patient has no known problems.



Allergies, Adverse Reactions, Alerts





        Allergy Name Allergy Type Status  Severity Reaction(s) Onset Date Inacti

ve Date 

Treating Clinician        Comments                  Source

 

       No Known Allergies DA     Active U             2017 00:00:00       

               [facility]







Family History





           Family Member Diagnosis  Comments   Start Date Stop Date  Source

 

           Natural father Hypertension                                  Abdi Palmer

 

           Natural father Kidney disease                                  [place]

n Mormon

 

           Natural father Scoliosis                                   Patton Me

thodist

 

           Natural mother Hypertension                                  Abdi Palmer

 

           Natural mother Lung cancer                                  Abdi GU

ethodist

 

           Natural mother Thyroid disease                                  Roddy

on Mormon







Social History





           Social Habit Start Date Stop Date  Quantity   Comments   Source

 

           History SDOH Alcohol Std Drinks                                      

       Abdi Palmer

 

           History SDOH Alcohol Binge                                           

  Abdi Palmer

 

           Sex Assigned At Birth                                             Reahn yates Mormon

 

                Alcohol intake  2020 00:00:00 2020 00:00:00 Current 

non-drinker of 

alcohol (finding)                                   Abdi Palmer

 

           History SDOH Alcohol Frequency 2019 00:00:00 2019 00:00:0

0 1                     

Abdi Palmer







                Smoking Status  Start Date      Stop Date       Source

 

                Never smoker                                    Abdi Fernandez

t







Medications





             Ordered Medication Name Filled Medication Name Start Date   Stop Da

te    Current 

Medication? Ordering Clinician Indication Dosage     Frequency  Signature (SIG) 

Comments                  Components                Source

 

                    acetaminophen-codeine (TYLENOL WITH CODEINE #3) 300-30 mg pe

r tablet                     

2020 00:00:00 2020 05:59:00 No                  [address]19     1{tbl}    

Q6H       Take 1-2 tablets 

by mouth every 6 (six) hours as needed for moderate pain for up to 15 days 
.acute pain.                                                Abdi Palmer

 

        cyanocobalamin (VITAMIN B-12) 1000 MCG tablet         2019 16:58:4

7         Yes                     

1000ug       QD           Take 1,000 mcg by mouth daily.                        

   Abdi Palmer

 

           multivit-minerals/ferrous fum (MULTI VITAMIN ORAL)            2019 16:58:47            Yes         

                                        Take by mouth.                     Roddy Palmer

 

       potassium gluconate 595 mg (99 mg) tablet        2019 16:58:47     

   Yes                                

Take by mouth.                                              Abdi Palmer

 

             calcium carb/vit D3/minerals (CALCIUM-VITAMIN D ORAL)              

2019 16:58:47              

Yes                                     Take by mouth.                 Abdi berg

 

                    cholecalciferol, vitamin D3, (VITAMIN D3) 2,000 unit capsule

 capsule                     

2019 16:58:47        Yes                  2000U  QD     Take 2,000 Units b

y mouth daily.               

Abdi Palmer

 

      ginkgo biloba (GINKGO ORAL)       2019 16:58:47       Yes           

                Take by mouth.  

                                                    Abdi Palmer

 

                    oxyCODone (OxyCONTIN) 40 mg tablet,oral only,ext.rel.12 hr E

R tablet                     

2019 00:00:00         Yes                                     TAKE ONE (1)

 TABLET(S) BY MOUTH TWICE A DAY.  

                                                    Abdi Palmer

 

       zolpidem (AMBIEN) 10 mg tablet        2019-01-10 00:00:00        Yes     

                           TK 1 T PO  

QHS FOR INSOMNIA                                            Abdi Palmer

 

             HYDROcodone-acetaminophen (NORCO)  mg per tablet             

 2019 00:00:00              

Yes                                                             Abdi manuel

 

     gabapentin (NEURONTIN) 600 mg tablet      2019 00:00:00      Yes     

                                

Abdi Palmer

 

                NOVOLIN 70/30 U-100 INSULIN 100 unit/mL (70-30) injection       

          2018 00:00:00 

        Yes                                                             Abdi Palmer

 

     TiZANidine (ZANAFLEX) 4 MG capsule      2018-12-10 00:00:00      Yes       

                              

Abdi Palmer

 

        hydroCHLOROthiazide (HYDRODIURIL) 25 MG tablet         2018 00:00:

00         Yes                      

                                                                 Abdi Laughlin

st

 

       lisinopril (PRINIVIL,ZESTRIL) 20 mg tablet        2018 00:00:00    

    Yes                                 

                                                            Abdi Palmer

 

       escitalopram (LEXAPRO) 20 MG tablet        2018 00:00:00        Yes

                                TK 2 TS 

PO QAM                                                      Abdi Palmer







Vital Signs





             Vital Name   Observation Time Observation Value Comments     Source

 

             Systolic blood pressure 2020 00:15:00 145 mm[Hg]             

   Abdi Palmer

 

             Diastolic blood pressure 2020 00:15:00 88 mm[Hg]             

    Abdi Palmer

 

             Heart rate   2020 00:15:00 60 /min                   Abdi Palmer

 

             Respiratory rate 2020 00:15:00 18 /min                   Eduin Palmer

 

                    Oxygen saturation in Arterial blood by Pulse oximetry  00:15:00 99 

/min                                                Abdi Palmer

 

             Body temperature 2020 20:52:00 37 Adrienne                    Eduin Palmer

 

             Body height  2020 20:52:00 177.8 cm                  Abdi Palmer

 

             Body weight  2020 20:52:00 83.915 kg                 Abdi Palmer

 

             BMI          2020 20:52:00 26.54 kg/m2               Abdi Palmer







Procedures





                Procedure       Date / Time Performed Performing Clinician Sour

e

 

                XR HIP 2-3 VIEWS LEFT 2020 23:14:07 Radames Del Cid

 

                CT LUMBAR SPINE WO CONTRAST 2020 22:11:06 Radames Del Cid







Plan of Care





             Planned Activity Planned Date Details      Comments     Source

 

                    Future Scheduled Test 2020 00:00:00 INFLUENZA VACCINE 

[code = INFLUENZA 

VACCINE]                                            [facility]

 

                    Future Scheduled Test 2020-04-15 00:00:00 COLONOSCOPY SCREEN

ING [code = 

COLONOSCOPY SCREENING]                              [facility]

 

                    Future Scheduled Test 2020-04-15 00:00:00 SHINGLES VACCINES 

(#1) [code = 

SHINGLES VACCINES (#1)]                             [facility]

 

                    Future Scheduled Test 1980-04-15 00:00:00 DIABETIC FOOT EXAM

 [code = DIABETIC 

FOOT EXAM]                                          [facility]

 

                    Future Scheduled Test 1980-04-15 00:00:00 URINE MICROALBUMIN

 [code = URINE 

MICROALBUMIN]                                       [facility]

 

                    Future Scheduled Test 1970 00:00:00 DIABETIC RETINAL E

YE EXAM [code = 

DIABETIC RETINAL EYE EXAM]                           [facility]







Encounters





             Start Date/Time End Date/Time Encounter Type Admission Type Attendi

[facility]   Care Department Encounter ID    Source

 

        2020 00:00:00 2020 00:00:00 Emergency                 [place] [address]     [id number] 

[facility]

 

          2019 13:12:00 2019 23:59:00 Outpatient           Hampel, N

ehemia [facility]                    [id number]              [facility] Outpatient 

[facility] [place]

 

           2017 11:05:00 2017 17:55:00 Outpatient            Jose Carolina 

[facility]               [id number]        [facility]

 

           2015 13:52:00 2015 11:25:00 Outpatient            Jose Carolina 

[facility]             [id number]         







Results





           Test Description Test Time  Test Comments Results    Result Comments 

Source

 

                - CT C-SPINE W/O CONTRAST 2020 17:11:00                   

Name: SHANNA CRUMP JR  

         [facility]     : 1970 Age/S: 49  / M      
  [address] [facility]           Unit #: [id number]     Loc:               
[place] [address]                Phys: Holly Sanchez MD                       
                           Acct: [id number]  Dis Date:               Status: 
REG ER                                  PHONE #: [phone]     Exam Date: 
2020  1631                     FAX #: [phone]      Reason: fall, 
headache, neck pain, left shoulder            EXAMS:                            
                  CPT CODE:      [id number] CT C-SPINE W/O CONTRAST              
     [address]                    HISTORY:  fall, headache, neck pain, left shoulder
              TECHNIQUE: Noncontrast 2.5 mm axial CT of the head and cervical 
spine.       Examination acquired within 24 hours of arrival. Automated exposure
      control for dose reduction.               COMPARISON: CT scan of the brain
and cervical spine 2020               FINDINGS:               No 
lacerations or contusions of the scalp or facial soft tissues.        Calvarium 
and skull base are intact.               No acute hemorrhage. No intracranial 
mass, mass effect, or midline       shift. No effacement of the sulci or grey-
white matter interface.                No cortical atrophy.  No signs of white 
matter small-vessel disease.               No hydrocephalus.. No extra-axial 
fluid collection.                Visualized paranasal sinuses are clear.        
Mastoid air cells and middle ear cavities are clear.        Orbital contents are
unremarkable.                No acute fracture of the cervical spine. No 
subluxation.       Craniocervical and cervicothoracic articulations are 
appropriate.                There is height loss of the C4 vertebral body as 
well as the C4-C5 and       C5-C6 disc spaces. Prominent vertebral body 
osteophytes are also       present on C4 and C5.       There is moderate right-
sided and severe left-sided foraminal       narrowing at C4-C5.               No
prevertebral or paraspinal soft tissue abnormality.        Lung apices are 
clear.                  IMPRESSION:                   Negative CT head.         
Degenerative changes are present in the cervical spine, similar to the         
prior examination. However no fracture or malalignment is appreciated.          
        Location: RR       PAGE  1                       Signed Report          
         (CONTINUED)   Name: SHANNA CRUMP JR            [facility]     : 1970 Age/S: 49  / M         [address] 
         Unit #: [id number]     Loc:               [place] [address]           
    Phys: Holly Sanchez MD                                                   
Acct: [id number]  Dis Date:               Status: REG ER                      
           PHONE #: [phone]     Exam Date: 2020  1631                
    FAX #: [phone]      Reason: fall, headache, neck pain, left shoulder   
        EXAMS:                                               CPT CODE:      
[id number] CT C-SPINE W/O CONTRAST                    [address]88               
<Continued>      ** Electronically Signed by Robert Hatfield MD on 2020 at 
1711 **                      Reported and signed by: Robert Hatfield MD             
                         CC: Holly Sanchez MD; Jayson Caal               
                                                                                
   Technologist:Joanna Santiago                     CTDI:        DLP:        Trnscb 
Date/Time: 2020 (1711) [email or web address].[email or web address]                       Orig Print D/T: S:
2020 (3554)      PAGE  2                       Signed Report              
                                                     

 

                - CT HEAD/BRAIN W/O CONT 2020 17:11:00                   N

josé luis: SHANNA CRUMP [facility]     : 1970 Age/S: 49  / M       
 [address]           Unit #: [id number]     Loc:               
[place] [address]                Phys: Holly Sanchez MD                       
                           Acct: [id number]  Dis Date:               Status: 
REG ER                                  PHONE #: [phone]     Exam Date: 
2020  1627                     FAX #: [phone]      Reason: fall, 
headache, neck pain, left shoulder            EXAMS:                            
                  CPT CODE:      [id number] CT HEAD/BRAIN W/O CONT               
     [address]                    HISTORY:  fall, headache, neck pain, left shoulder
              TECHNIQUE: Noncontrast 2.5 mm axial CT of the head and cervical 
spine.       Examination acquired within 24 hours of arrival. Automated exposure
      control for dose reduction.               COMPARISON: CT scan of the brain
and cervical spine 2020               FINDINGS:               No 
lacerations or contusions of the scalp or facial soft tissues.        Calvarium 
and skull base are intact.               No acute hemorrhage. No intracranial 
mass, mass effect, or midline       shift. No effacement of the sulci or grey-
white matter interface.                No cortical atrophy.  No signs of white 
matter small-vessel disease.               No hydrocephalus.. No extra-axial 
fluid collection.                Visualized paranasal sinuses are clear.        
Mastoid air cells and middle ear cavities are clear.        Orbital contents are
unremarkable.                No acute fracture of the cervical spine. No 
subluxation.       Craniocervical and cervicothoracic articulations are 
appropriate.                There is height loss of the C4 vertebral body as 
well as the C4-C5 and       C5-C6 disc spaces. Prominent vertebral body 
osteophytes are also       present on C4 and C5.       There is moderate right-
sided and severe left-sided foraminal       narrowing at C4-C5.               No
prevertebral or paraspinal soft tissue abnormality.        Lung apices are 
clear.                  IMPRESSION:                   Negative CT head.         
Degenerative changes are present in the cervical spine, similar to the         
prior examination. However no fracture or malalignment is appreciated.          
        Location:        PAGE  1                       Signed Report          
         (CONTINUED)   Name: SHANNA CRUMP JR            [facility]     : 1970 Age/S: 49  / M         [address] [place] [address] 
         Unit #: [id number]     Loc:               [place] [address]           
    Phys: Holly Sanchez MD                                                   
Acct: [id number]  Dis Date:               Status: REG ER                      
           PHONE #: [phone]     Exam Date: 2020  1627                
    FAX #: [phone]      Reason: fall, headache, neck pain, left shoulder   
        EXAMS:                                               CPT CODE:      
[id number] CT HEAD/BRAIN W/O CONT                     [address]               
<Continued>      ** Electronically Signed by Robert Hatfield MD on 2020 at 
1711 **                      Reported and signed by: Robert Hatfield MD             
                         CC: Holly Sanchez MD; Jayson Caal               
                                                                                
   Technologist:Joanna Santiago                     CTDI:        DLP:        Trnscb 
Date/Time: 2020 (171) t.[email or web address].RR31                       Orig Print D/T: S:
2020 (1714)      PAGE  2                       Signed Report              
                                                     

 

                - XR SHOULDER 2 + V LT 2020 17:05:00                   Nam

e: SHANNA CRUMP JR     

       [facility]    : 1970 Age/S:49  /M           
[address] [place] [address]           Unit#:[id number]     Loc: [place]          
[place] [address]               Phys: Holly Sanchez MD                        
                         Acct#: [id number] Dis Date:                   PHONE 
#: [phone]      Status: REG ER                                   FAX #: 
[phone]      Exam Date: 2020           Reason: fall, headache, neck 
pain, left shoulder            EXAMS:                                           
   CPT CODE:      [id number] XR SHOULDER 2 + V LT                       [address]678    
               HISTORY:  fall, headache, neck pain, left shoulder               
TECHNIQUE: Internal/external rotation AP and scapular Y-views of the       left 
shoulder.               FINDINGS:                 No acute fracture.            
   Glenohumeral and acromioclavicular joints are not dislocated.                
Articulating surfaces appear preserved.               Regional soft tissues are 
unremarkable.               Visualized thorax is within normal limits.          
              IMPRESSION: Negative radiographic examination of the left 
shoulder.                   Location: RR          ** Electronically Signed by 
Robert Hatfield MD on 2020 at 1705 **                      Reported and signed
by: Robert Hatfield MD                   CC: Holly Sanchez MD; Jayson Caal   
                                                                                
               Technologist: Joanna Santiago                                        
   Trnscrpt Data: 2020 (1705) t.[email or web address]                         Orig 
Print D/T: S: 2020 (8260)                         PAGE  1                 
     Signed Report                                                           

 

                    BASIC METABOLIC PANEL 2020 22:55:00   

 

                                        Test Item

 

             SODIUM (test code = NA) 140 mmol/L   136-145      N             

 

             POTASSIUM (test code = K) 3.5 mmol/L   3.5-5.1      N             

 

             CHLORIDE (test code = CL) 108.0 mmol/L        H             

 

             CARBON DIOXIDE (test code = CO2) 27.0 mmol/L  21-32        N       

      

 

             ANION GAP (test code = GAP) 8.5          10-20        L            

 

 

             GLUCOSE (test code = GLU) 198 mg/dL           H             

 

             BLOOD UREA NITROGEN (test code = BUN) 8 mg/dL      7-18         N  

           

 

             GLOMERULAR FILTRATION RATE (test code = GFR) > 60 mL/min  >=60     

                 Estimated GFR by

using Modified MDRD formula.Chronic kidney disease is defined as either kidney 
damageor GFR <60 mL/min/1.73 m2 for >3 months.

 

             CREATININE (test code = CREAT) 0.80 mg/dL   0.7-1.3      N         

    

 

             BUN/CREATININE RATIO (test code = BUN/CREA) 10.0         10-20     

   N             

 

             CALCIUM (test code = CA) 9.2 mg/dL    8.5-10.1     N             





BASIC METABOLIC [id number] 22:42:00* 



             Test Item    Value        Reference Range Interpretation Comments

 

             SODIUM (test code = NA) 140 mmol/L   136-145      N             

 

             POTASSIUM (test code = K) 3.5 mmol/L   3.5-5.1      N             

 

             CHLORIDE (test code = CL) 108.0 mmol/L        H             

 

             CARBON DIOXIDE (test code = CO2)  mmol/L      21-32                

      

 

             ANION GAP (test code = GAP)              10-20                     

 

 

             GLUCOSE (test code = GLU)  mg/dL                            

 

             BLOOD UREA NITROGEN (test code = BUN)  mg/dL       7-18            

           

 

             GLOMERULAR FILTRATION RATE (test code = GFR)  mL/min      >=60     

                  

 

             CREATININE (test code = CREAT)  mg/dL       0.7-1.3                

    

 

             BUN/CREATININE RATIO (test code = BUN/CREA)              10-20     

                 

 

             CALCIUM (test code = CA)  mg/dL       8.5-10.1                   





- CT L-SPINE W/O [id number] 22:39:00  Name: THERONSHANNA JAZMÍN VARGAS      
     [facility]                     : 1970 Age/S: 49  / M         
[address] [facility]                Unit #: [id number]     Loc:               
[place]  [address]              Phys: Blas Lester MD                          
                           Acct: [id number]  Dis Date:               Status: 
REG ER                                  PHONE #: [phone]     Exam Date: 
2020                     FAX #: [phone]      Reason: trauma, 
back pain, neuro Sx                         EXAMS:                              
                CPT CODE:      [id number] CT L-SPINE W/O CONTRAST                
   [address]                    EXAM:  - CT T-SPINE W/O CONTRAST,  - CT L-SPINE W/O 
CONTRAST               HISTORY: Fall.               TECHNIQUE: Axial tomograms 
through thoracic and lumbar spine were       obtained without intravenous 
contrast. Sagittal and coronal       reformatted images are provided.       This
exam was performed according to our departmental       dose-optimization 
program, which includes automated exposure control,       adjustment of the mA 
and/or kV according to patient size and/or use of       iterative reconstruction
technique.               COMPARISON: None available time of interpretation.     
         FINDINGS:               Vertebral heights and alignment are maintained.
No acute fracture.       There is no subluxation or spondylolisthesis.        
The soft tissues are within normal limits.        Degenerative disc disease and 
facet arthropathy is present at multiple       levels particularly in lower 
lumbar spine at L4-L5 level associated       with endplate changes and posterior
osteophytes.                 IMPRESSION:                   No acute osseous 
abnormality with other findings as above.           ** Electronically Signed by 
Armani Medel MD on 2020 at 2239 **                      Reported and 
signed by: Armani Medel MD             CC: Jayson Caal; Blas Lester MD  
                                                                                
                   Technologist:SHANNA MCNEAL, RT(R)      CT    CTDI:        
DLP:        Trnscb Date/Time: 2020 (2239) [email or web address]                      
Orig Print D/T: S: 2020 (9336)      PAGE  1                       Signed 
Report                               - CT T-SPINE W/O [id number] 
22:39:00  Name: SHANNA CRUMP JR            [facility]                
    : 1970 Age/S: 49  / M         [address]                Unit 
#: [id number]     Loc:               [place]  [address]              Phys: 
Blas Lester MD                                                      Acct: 
[id number]  Dis Date:               Status: REG ER                            
     PHONE #: [phone]     Exam Date: 2020                     
FAX #: [phone]      Reason: trauma, back pain, neuro Sx                    
    EXAMS:                                               CPT CODE:      
[id number] CT T-SPINE W/O CONTRAST                    [id number]                    
EXAM:  - CT T-SPINE W/O CONTRAST,  - CT L-SPINE W/O CONTRAST               
HISTORY: Fall.               TECHNIQUE: Axial tomograms through thoracic and 
lumbar spine were       obtained without intravenous contrast. Sagittal and 
coronal       reformatted images are provided.       This exam was performed 
according to our departmental       dose-optimization program, which includes 
automated exposure control,       adjustment of the mA and/or kV according to 
patient size and/or use of       iterative reconstruction technique.            
  COMPARISON: None available time of interpretation.               FINDINGS:    
          Vertebral heights and alignment are maintained. No acute fracture.    
  There is no subluxation or spondylolisthesis.        The soft tissues are 
within normal limits.        Degenerative disc disease and facet arthropathy is 
present at multiple       levels particularly in lower lumbar spine at L4-L5 
level associated       with endplate changes and posterior osteophytes.         
       IMPRESSION:                   No acute osseous abnormality with other 
findings as above.           ** Electronically Signed by Armani Medel MD on 
2020 at 2239 **                      Reported and signed by: 
Armani Medel MD             CC: Jayson Caal Evan MD             
                                                                                
        Technologist:SHANNA MCNEAL, RT(R)      CT    CTDI:        DLP:        
Trnscb Date/Time: 2020 () [email or web address]                       Orig Print 
D/T: S: 2020 (1571)      PAGE  1                       Signed Report      
                        CBC W/O [id number] 22:34:00* 



             Test Item    Value        Reference Range Interpretation Comments

 

             WHITE BLOOD CELL (test code = WBC) 5.1 K/mm3    4.5-12.5     N     

        

 

             RED BLOOD CELL (test code = RBC) 4.35 mill/mm3 4.0-5.8      N      

       

 

             HEMOGLOBIN (test code = HGB) 11.8 gram/dL 13.0-17.5    L           

  

 

             HEMATOCRIT (test code = HCT) 35.7 %       42.0-52.0    L           

  

 

             MEAN CELL VOLUME (test code = MCV) 82.1 fL      80-98        N     

        

 

             MEAN CELL HGB (test code = MCH) 27.1 picogram 27.0-33.0    N       

      

 

             MEAN CELL HGB CONCETRATION (test code = MCHC) 33.1 gram/dL 33.0-36.

0    N             

 

             RED CELL DISTRIBUTION WIDTH (test code = RDW) 12.8 %       11.6-16.

2    N             

 

             PLATELET COUNT (test code = PLT) 214 K/mm3    150-450      N       

      

 

             MEAN PLATELET VOLUME (test code = MPV) 10.9 fL      6.7-11.0     N 

            





- CT C-SPINE W/O [id number] 22:20:00  Name: SHANNA CRUMP [facility]                     : 1970 Age/S: 49  / M         
[address]                Unit #: [id number]     Loc:               
[place]  [address]              Phys: Blas Lester MD                          
                           Acct: [id number]  Dis Date:               Status: 
REG ER                                  PHONE #: [phone]     Exam Date: 
20204                     FAX #: [phone]      Reason: Neck Pain 
                                         EXAMS:                                 
             CPT CODE:      [id number] CT C-SPINE W/O CONTRAST                   
[address]                    EXAM:  - CT C-SPINE W/O CONTRAST               HISTORY:
Neck pain, fall.               TECHNIQUE: Axial tomograms through the cervical 
spine were obtained       without intravenous contrast. Sagittal and coronal 
reformatted images       are provided.       This exam was performed according 
to our departmental       dose-optimization program, which includes automated 
exposure control,       adjustment of the mA and/or kV according to patient size
and/or use of       iterative reconstruction technique.               
COMPARISON: 2017.               FINDINGS:               Vertebral 
heights and alignment are maintained. No acute fracture or       subluxation. 
The prevertebral soft tissues are within normal limits.       The visualized 
soft tissues of the neck show no significant       abnormalities. Degenerative 
disc disease and facet arthropathy is       present at multiple levels.         
       IMPRESSION:                   No acute osseous abnormality with other 
findings as above.           ** Electronically Signed by Armani Medel MD on 
2020 at 2220 **                      Reported and signed by: BRITTANIE Medel MD              CC: Jayson Caal; Blas Lester MD                     
                                                                                
Technologist:SHANNA MCNEAL RT(R)      CT    CTDI:        DLP:        Trnscb D
ate/Time: 2020 () t.[email or web address]                       Orig Print D/T: S: 
2020 (4799)      PAGE  1                       Signed Report              
                - XR PELVIS 1/2 [id number] 22:11:00 FAX: Jayson Salazar [phone]    [place]:    St: REG FAX:         
Blas Lester MD                      ----------------------------------------
---------------------------------------  Name:   CRUMPSHANNA JAZMÍN [facility]                     : 1970  Age/S: 49/M           [address] [facility]                Unit #: [id number]      Loc: [place]  [address]              Phys: Blas Lester MD                                     
                Acct: [id number] Dis Date:               Status: REG ER       
                         PHONE #: [phone]     Exam Date:     2020                   FAX #: [phone]     Reason: PELVIC PAIN              
                         EXAMS:                                               
CPT CODE:      [id number] XR PELVIS 1/2 VIEWS                        19056       
            EXAM:  - XR PELVIS 1/2 VIEWS               HISTORY: Pain. Fall.     
         COMPARISON: 2017..               FINDINGS:       Single AP
view of the pelvis is provided.       No acute fracture, dislocation, or other 
acute osseous abnormality is       demonstrated.       The femoral heads are 
located.                 IMPRESSION:                   No fracture or other ac
[place] osseous abnormality identified.          ** Electronically Signed by Armani Medel MD on 2020 at 2211 **                      Reported and signed by:
Armani Medel MD                        CC: Jayson Caal; Blas Lester MD  
                                                                                
                   Technologist: RANDELL RILEY; Satinder Barton, RT(R  
       Trnscrd Date/Time/By: 2020 (376) : By: [email or web address]          Orig 
Print D/T: S: 2020 (8139)                         PAGE  1                 
     Signed Report                               - XR CHEST 1 -15-87 
22:09:00 FAX: Jayson Salazar [phone]    [place]:    St: REG 
FAX:         Blas Lester MD                      
------------------------------------------------------------------------------- 
Name:   CRUMPSHANNA EMANUELUR [facility]                     :
1970  Age/S: 49/M           [address]                Unit #: 
[id number]      Loc: [place]  [address]              Phys: 
Blas Lester MD                                                      Acct: 
[id number] Dis Date:               Status: REG ER                             
   PHONE #: [phone]     Exam Date:     2020                 
 FAX #: [phone]     Reason: CHEST PAIN                                     
   EXAMS:                                               CPT CODE:      [id number]
XR CHEST 1 V                               [address]                    EXAM:  - XR 
CHEST 1 V               HISTORY: Chest pain.               COMPARISON: 2019.               FINDINGS:               Single AP view of the chest is 
provided.       Heart size and vascularity are within normal limits.        The 
lungs are clear of focal consolidation. No effusion,        pneumothorax, or 
acute osseous abnormality.                         IMPRESSION:         No 
radiographic evidence of acute cardiopulmonary process.          ** 
Electronically Signed by Armani Medel MD on 2020 at 2202 **             
        Reported and signed by: Armani Medel MD                       CC: 
Jayson Caal; Blas Lester MD                                               
                                                       Technologist: RANDELL AKBAR; Satinder Barton, RT(R          Trnscrd Date/Time/By: 2020 (2
209) : By: [email or web address]          Orig Print D/T: S: 2020 ()             
           PAGE  1                       Signed Report                          
    - CT HEAD/BRAIN W/O [id number] 22:07:00  Name: SHANNA CRUMP JR   
        [facility]                     : 1970 Age/S: 49  / M       
 [address]                Unit #: [id number]     Loc:               
[place]  [address]              Phys: Blas Lester MD                          
                           Acct: [id number]  Dis Date:               Status: 
REG ER                                  PHONE #: [phone]     Exam Date: 
2020                     FAX #: [phone]      Reason: HEADACHE  
                                         EXAMS:                                 
             CPT CODE:      [id number] CT HEAD/BRAIN W/O CONT                    
[address]                    EXAM:  - CT HEAD/BRAIN W/O CONT               HISTORY: 
Fall.               TECHNIQUE: Axial tomograms through the brain were obtained 
without       intravenous contrast.       This exam was performed according to 
our departmental       dose-optimization program, which includes automated 
exposure control,       adjustment of the mA and/or kV according to patient size
and/or use of       iterative reconstruction technique.               FINDINGS: 
             There is no intracranial hemorrhage, mass, or mass effect. The     
 ventricular system and sulci are age-appropriate. There is no evidence       of
acute infarction.               The osseous structures and orbits, show no 
significant abnormalities.        The visualized sinuses are relatively clear.  
     The soft tissues are unremarkable.                 IMPRESSION:             
     No acute intracranial abnormality with no evidence of intracranial         
hemorrhage.                    ** Electronically Signed by Armani Medel MD on 
2020 at 2207 **                      Reported and signed by: 
Armani Medel MD             CC: Jayson Caal Evan MD             
                                                                                
        Technologist:SHANNA MCNEAL, RT(R)      CT    CTDI:        DLP:        
Trnscb Date/Time: 2020 () Ericka.[email or web address]                       Orig Print 
D/T: S: 2020 (0)      PAGE  1                       Signed Report      
                        XR Hip 2-3 View [id number] 23:16:28Hm Interface, 
Radiology Results 2020  5:19 PM CSTEXAMINATION:  XR HIP 2-3 
VIEWS LEFTCLINICAL HISTORY:  s p fallTECHNIQUE:   3  views of left hip  
obtained.COMPARISON:  None.IMPRESSION:Mild to moderate OA. An osseous bump at 
the femoral head neck junction; clinical evidence of femoral acetabular impinge
ment. No acute fracture or dislocation identified. [facility]-[id number] 
[facility] Lumbar Spine Wo [id number] 22:17:50Hm Interface, Radiology 
Results 2020  4:20 PM CSTEXAMINATION:  CT LUMBAR SPINE WO 
CONTRASTCLINICAL HISTORY:  s p fall, COMPARISON:  None.Technique: Multiple axial
CT images of the lumbar spine are obtained without the use of intravenous 
contrast. Coronal and sagittal 3-D reconstructions are obtained.CT scans are 
performed using radiation dose reduction techniques.  Technical factors are 
evaluated and adjusted to ensure appropriate moderation of exposure.  Automated 
dose management technology is applied to adjust radiation exposure while 
achieving a diagnostic quality image.FINDINGS:The visualized portions of the ret
roperitoneum are unremarkable. The abdominal aorta has no aneurysmal dilatation.
There is no free fluid seen within the pelvis.The vertebral bodies maintain nor
mal height and alignment. There is no acute fracture or subluxation. Degenerativ
e changes are present within the cervical spine. There are small osteophytes see
n posteriorly at level L4-L5. There is minimal annular bulging seen at level L4-
L5. There is no narrowing of the spinal canal.The SI joints are unremarkable. Th
e sacrum demonstrates no abnormality.IMPRESSION:1. There is no acute fracture or
subluxation.2. There are minimal degenerative changes present consistent with o
steoarthritis.3. There are no osseous lesions present.Abdi Palmer
[id number] 11:38:00
--------------------------------------------------------------------------------
------------RUN DATE: 19                         [facility]           
              PAGE 1   RUN TIME: 1139                            Specimen Inqui
ry                    RUN USER: INTERFACE                                       
                   ------------------------------------------------------------
--------------------------------PATIENT: SHANNA CRUMP JR        ACCT #: [id number]
[id number] LOC:  [place]      U #: [id number]                                    
  AGE/SX: 49/M         ROOM:            RE19REG DR:  Jose De Leon MD   
           :    [birth year]     BED:             DIS:                           
                    STATUS: DEP [facility]      TLOC:           --------------------
------------------------------------------------------------------------ SPEC #:
[id number]     RECD:      STATUS:  CHIKI           [facility] #: [id number]
[id number]                           MARK: 19-         [facility] DR: Jose De Leon MD  
             ENTERED:      SP TYPE: GALLBLADD      DANIELLE DR: Jayson Seo od, DO         ORDERED:  GROSS                                         
                                    COPIES TO:   Jayson Caal DO   [address]   [place] [address] [phone]    Jose De Leon MD   [address]   [place] [address]   [phone] MARKERS: ABNORMAL TISSUE, GALLANANDE
R PROCEDURES: GROSS () TISSUES:           GALLBLADDER, NOS         
CLINICAL HISTORY    COLLECTION DATE: 19       CHOLECYSTITIS, CHOLELITHIASIS 
       FINAL DIAGNOSIS    Gallbladder, cholecystectomy:        CHRONIC CHOLECYS
TITIS        CHOLELITHIASIS        NEGATIVE FOR MALIGNANCY           FA/sm   A  
[address]           MACROSCOPIC    The specimen is received in formalin labeled with
the patient's name,   "gallbladder" and consists of an intact gallbladder with a
smooth green serosal   surface.  The specimen measures 7 cm in length with di
ameter up to 2.5 cm.  A    1.3 cm segment of duct is clamped.  No lymph node is 
identified.  The lumen                                ** CONTINUED ON NEXT PAGE 
** -----------------------------------------------------------------------------
---------------RUN DATE: 19                         [facility]        
                 PAGE 2   RUN TIME: 1139                            Specimen In
quiry                    RUN USER: INTERFACE                                    
                      ---------------------------------------------------------
-----------------------------------SPEC #: [id number]    PATIENT: KAROLINE CRUMP JR         #[id number]  (Continued)---------------------------------
-----------------------------------------------------------           MACROSCOPI
C             (Continued)    contains viscous green bile with several black pigm
ent-type stones ranging from   0.1 to 0.3 cm in diameter.  The mucosal surface i
s dark green and velvety.  The   gallbladder wall measures up to 0.2 cm in thick
ness.  No nodules or masses are   identified.  Representative tissue is submitte
d in a single cassette.       GROSS PERFORMED AT [facility]   [address] [place] [address] (p)[phone]           MICROSCOPIC    All of the stains, including any contr
ols performed, stain   appropriately.       MICROSCOPIC PERFORMED AT [facility]   [address] [place] 
[address] (p)[phone]         PERFORMING SITE    Diagnosis performed at:      
 [facility]     
  [address] [place] [address]        [phone]---------
--------------------------------------------------------------------------------
--- Signed SIGNATURE ON FILE                        Shahnaz Garcia MD 19 11
38     -------------------------------------------------------------------------
-------------------                                                 ** END OF RE
PORT ** [id number] 14:10:00* 



             Test Item    Value        Reference Range Interpretation Comments

 

             GLUBED (test code = GLUBED) 143 mg/dL           H            

Performed by certified  

at [facility]





COMPREHENSIVE METABOLIC [id number] 15:17:00* 



             Test Item    Value        Reference Range Interpretation Comments

 

             SODIUM (test code = NA) 141 mmol/L   136-145      N             

 

             POTASSIUM (test code = K) 4.1 mmol/L   3.5-5.1      N             

 

             CHLORIDE (test code = CL) 107.0 mmol/L        N             

 

             CARBON DIOXIDE (test code = CO2) 26.0 mmol/L  21-32        N       

      

 

             ANION GAP (test code = GAP) 12.1         10-20        N            

 

 

             GLUCOSE (test code = GLU) 284 mg/dL           H             

 

             BLOOD UREA NITROGEN (test code = BUN) 8 mg/dL      7-18         N  

           

 

             GLOMERULAR FILTRATION RATE (test code = GFR) > 60 mL/min  >=60     

                 Estimated GFR by

using Modified MDRD formula.Chronic kidney disease is defined as either kidney 
damageor GFR <60 mL/min/1.73 m2 for >3 months.

 

             CREATININE (test code = CREAT) 0.80 mg/dL   0.7-1.3      N         

    

 

             BUN/CREATININE RATIO (test code = BUN/CREA) 10.0         10-20     

   N             

 

             TOTAL PROTEIN (test code = PROT) 7.2 gram/dL  6.4-8.2      N       

      

 

             ALBUMIN (test code = ALB) 3.7 g/dL     3.4-5.0      N             

 

             GLOBULIN (test code = GLOB) 3.5 gram/dL  2.7-4.2      N            

 

 

             ALBUMIN/GLOBULIN RATIO (test code = A/G) 1.1          0.75-1.50    

N             

 

             CALCIUM (test code = CA) 9.3 mg/dL    8.5-10.1     N             

 

             BILIRUBIN TOTAL (test code = BILT) 0.20 mg/dL   0.0-1.0      N     

        

 

             SGOT/AST (test code = AST) 17 IUnit/L   15-37        N             

 

             SGPT/ALT (test code = ALT) 32 IUnit/L   12-78        N             

 

             ALKALINE PHOSPHATASE TOTAL (test code = ALKP) 89 IUnit/L     

     N            **Note change 

in reference range due to change in reagent.**





COMPREHENSIVE METABOLIC [id number] 15:03:00* 



             Test Item    Value        Reference Range Interpretation Comments

 

             SODIUM (test code = NA) 141 mmol/L   136-145      N             

 

             POTASSIUM (test code = K) 4.1 mmol/L   3.5-5.1      N             

 

             CHLORIDE (test code = CL) 107.0 mmol/L        N             

 

             CARBON DIOXIDE (test code = CO2)  mmol/L      21-32                

      

 

             ANION GAP (test code = GAP)              10-20                     

 

 

             GLUCOSE (test code = GLU)  mg/dL                            

 

             BLOOD UREA NITROGEN (test code = BUN)  mg/dL       7-18            

           

 

             GLOMERULAR FILTRATION RATE (test code = GFR)  mL/min      >=60     

                  

 

             CREATININE (test code = CREAT)  mg/dL       0.7-1.3                

    

 

             BUN/CREATININE RATIO (test code = BUN/CREA)              10-20     

                 

 

             TOTAL PROTEIN (test code = PROT)  gram/dL     6.4-8.2              

      

 

             ALBUMIN (test code = ALB)  g/dL        3.4-5.0                    

 

             GLOBULIN (test code = GLOB)  gram/dL     2.7-4.2                   

 

 

             ALBUMIN/GLOBULIN RATIO (test code = A/G)              0.75-1.50    

              

 

             CALCIUM (test code = CA)  mg/dL       8.5-10.1                   

 

             BILIRUBIN TOTAL (test code = BILT)  mg/dL       0.0-1.0            

        

 

             SGOT/AST (test code = AST)  IUnit/L     15-37                      

 

             SGPT/ALT (test code = ALT)  IUnit/L     12-78                      

 

             ALKALINE PHOSPHATASE TOTAL (test code = ALKP)  IUnit/L       

                   





CBC W/AUTO [id number] 14:29:00* 



             Test Item    Value        Reference Range Interpretation Comments

 

             WHITE BLOOD CELL (test code = WBC) 5.2 K/mm3    4.5-12.5     N     

        

 

             RED BLOOD CELL (test code = RBC) 4.50 mill/mm3 4.0-5.8      N      

       

 

             HEMOGLOBIN (test code = HGB) 11.3 gram/dL 13.0-17.5    L           

  

 

             HEMATOCRIT (test code = HCT) 37.1 %       42.0-52.0    L           

  

 

             MEAN CELL VOLUME (test code = MCV) 82.4 fL      80-98        N     

        

 

             MEAN CELL HGB (test code = MCH) 25.1 picogram 27.0-33.0    L       

      

 

             MEAN CELL HGB CONCETRATION (test code = MCHC) 30.5 gram/dL 33.0-36.

0    L             

 

             RED CELL DISTRIBUTION WIDTH (test code = RDW) 15.2 %       11.6-16.

2    N             

 

             RED CELL DISTRIBUTION WIDTH SD (test code = RDW-SD) 45.9 fL      37

.0-51.0    N             

 

             PLATELET COUNT (test code = PLT) 189 K/mm3    150-450      N       

      

 

             MEAN PLATELET VOLUME (test code = MPV) 11.0 fL      6.7-11.0     N 

            

 

             NEUTROPHIL % (test code = NT%) 59.0 %       39.0-69.0    N         

    

 

             IMMATURE GRANULOCYTE % (test code = IG%) 0.4 %        0.0-5.0      

N             

 

             LYMPHOCYTE % (test code = LY%) 31.5 %       25.0-55.0    N         

    

 

             MONOCYTE % (test code = MO%) 6.4 %        0.0-10.0     N           

  

 

             EOSINOPHIL % (test code = EO%) 2.3 %        0.0-5.0      N         

    

 

             BASOPHIL % (test code = BA%) 0.4 %        0.0-1.0      N           

  

 

             NUCLEATED RBC % (test code = NRBC%) 0.0 %        0-0          N    

         

 

             NEUTROPHIL # (test code = NT#) 3.04 K/mm3   1.8-7.7      N         

    

 

             IMMATURE GRANULOCYTE # (test code = IG#) 0.02 x10 3/uL 0-0.03      

 N             

 

             LYMPHOCYTE # (test code = LY#) 1.62 K/mm3   1.0-5.0      N         

    

 

             MONOCYTE # (test code = MO#) 0.33 K/mm3   0-0.8        N           

  

 

             EOSINOPHIL # (test code = EO#) 0.12 K/mm3   0.0-0.5      N         

    

 

             BASOPHIL # (test code = BA#) 0.02 K/mm3   0.0-0.2      N           

  

 

             NUCLEATED RBC # (test code = NRBC#) 0.00 K/mm3   0.0-0.1      N    

         





GASTRIC,[id number] 11:26:00
--------------------------------------------------------------------------------
------------RUN DATE: 19                         [facility]           
              PAGE 1   RUN TIME: 1126                            Specimen Inqui
ry                    RUN USER: INTERFACE                                       
                   ------------------------------------------------------------
--------------------------------PATIENT: CRUMPSHANNA JR.       ACCT #: [id number]
[id number] LOC:  SULLYU      U #: [id number]                                    
  AGE/SX: 49/M         ROOM:            RE/15/19GUANACO DR:  Pete Johnson MD        :    [birth year]     BED:             DIS:                           
                    STATUS: DEP [facility]      TLOC:           --------------------
------------------------------------------------------------------------ SPEC #:
[id number]     RECD: 05/15/     STATUS:  CHIKI ZHENG #: [id number]
[id number]                           MARK: 05/15/     [facility] DR: Pete Johnson MD         ENTERED:  05/15/    SP TYPE: GASTRIC BX     OT DR: Jayson Seo od, DO         ORDERED:  GROSS                                         
                                    COPIES TO:   Pete Johnson MD   [address]   [place], [place] [address] [phone]    Jayson Caal DO   [address]   [place], [place] [id number] [phone] PROCEDURES: GROSS () TISSUES:           1. GASTRIC CORPUS - POUCH BX         2. G/E JUNCTION - BX
        CLINICAL HISTORY    COLLECTION DATE: 5/15/19       PERSISTENT NAUSEA, D
YSPHAGIA, ABDOMINAL PAIN         FINAL DIAGNOSIS    Gastric pouch biopsy:       
    GASTRIC MUCOSA, NO PATHOLOGIC ALTERATION        NEGATIVE FOR HELICOBACTER P
YLORI       G.E.- Junction, biopsy:        SQUAMOUS ESOPHAGEAL MUCOSA WITH REACT
ANA EPITHELIAL CHANGES          INCLUDING AN INCREASE IN THE HEIGHT OF THE PAPIL
LAE        NEGATIVE FOR METAPLASIA, DYSPLASIA, AND MALIGNANCY           DMW/sm  
D   2) 75216, 15256                                   ** CONTINUED ON NEXT PAGE
** ---------------------------------------------------------------------------
-----------------RUN DATE: 19                         [facility]      
                   PAGE 2   RUN TIME:                             Specimen 
Inquiry                    RUN USER: INTERFACE                                  
                        -------------------------------------------------------
-------------------------------------SPEC #: [id number]    PATIENT: JANESSA CRUMP JR.        #[id number]  (Continued)-------------------------------
-------------------------------------------------------------            MACROSC
OPIC    The first specimen is received in formalin, labeled with the patient's n
josé luis,   and identified as "gastric pouch bx".  It consists of tan biopsy tissue  
measuring 0.6 cm in length.  An H E and a Giemsa stain will be prepared.       
The second specimen is received in formalin, labeled with the patient's name,   
identified as "g.e.-junction bx".  It consists of tan biopsy tissue measuring   
0.25 cm.            GROSS PERFORMED AT [facility]   [address] [place] [address]   [phone]
[phone]           MICROSCOPIC    All of the stains, including any controls perfor
med, stain   appropriately.       MICROSCOPIC PERFORMED AT [facility]   [address] [place] [address]   [phone]
[phone]         PERFORMING SITE    Diagnosis performed at:        [facility]        [address] 
[place] [address] [phone]-------------------
------------------------------------------------------------------------- Signed
SIGNATURE ON FILE                        Aditi Max MD 19 1126 ---
--------------------------------------------------------------------------------
---------                                            ** END OF REPORT ** GLUBED
2019-05-15 06:22:00* 



             Test Item    Value        Reference Range Interpretation Comments

 

             GLUBED (test code = GLUBED) 107 mg/dL           H            

Performed by certified  

at [facility]





BASIC METABOLIC [id number] 14:50:00* 



             Test Item    Value        Reference Range Interpretation Comments

 

             SODIUM (test code = NA) 139 mmol/L   136-145      N             

 

             POTASSIUM (test code = K) 4.1 mmol/L   3.5-5.1      N             

 

             CHLORIDE (test code = CL) 103.0 mmol/L        N             

 

             CARBON DIOXIDE (test code = CO2) 31.0 mmol/L  21-32        N       

      

 

             ANION GAP (test code = GAP) 9.1          10-20        L            

 

 

             GLUCOSE (test code = GLU) 99 mg/dL            N             

 

             BLOOD UREA NITROGEN (test code = BUN) 11 mg/dL     7-18         N  

           

 

             GLOMERULAR FILTRATION RATE (test code = GFR) > 60 mL/min  >=60     

                 Estimated GFR by

using Modified MDRD formula.Chronic kidney disease is defined as either kidney 
damageor GFR <60 mL/min/1.73 m2 for >3 months.

 

             CREATININE (test code = CREAT) 0.80 mg/dL   0.7-1.3      N         

    

 

             BUN/CREATININE RATIO (test code = BUN/CREA) 13.8         10-20     

   N             

 

             CALCIUM (test code = CA) 9.3 mg/dL    8.5-10.1     N             





BASIC METABOLIC [id number] 14:40:00* 



             Test Item    Value        Reference Range Interpretation Comments

 

             SODIUM (test code = NA) 139 mmol/L   136-145      N             

 

             POTASSIUM (test code = K) 4.1 mmol/L   3.5-5.1      N             

 

             CHLORIDE (test code = CL) 103.0 mmol/L        N             

 

             CARBON DIOXIDE (test code = CO2)  mmol/L      21-32                

      

 

             ANION GAP (test code = GAP)              10-20                     

 

 

             GLUCOSE (test code = GLU)  mg/dL                            

 

             BLOOD UREA NITROGEN (test code = BUN)  mg/dL       7-18            

           

 

             GLOMERULAR FILTRATION RATE (test code = GFR)  mL/min      >=60     

                  

 

             CREATININE (test code = CREAT)  mg/dL       0.7-1.3                

    

 

             BUN/CREATININE RATIO (test code = BUN/CREA)              10-20     

                 

 

             CALCIUM (test code = CA)  mg/dL       8.5-10.1                   





CBC W/AUTO [id number] 13:08:00* 



             Test Item    Value        Reference Range Interpretation Comments

 

             WHITE BLOOD CELL (test code = WBC) 5.7 K/mm3    4.5-12.5     N     

        

 

             RED BLOOD CELL (test code = RBC) 4.72 mill/mm3 4.0-5.8      N      

       

 

             HEMOGLOBIN (test code = HGB) 11.9 gram/dL 13.0-17.5    L           

  

 

             HEMATOCRIT (test code = HCT) 38.4 %       42.0-52.0    L           

  

 

             MEAN CELL VOLUME (test code = MCV) 81.4 fL      80-98        N     

        

 

             MEAN CELL HGB (test code = MCH) 25.2 picogram 27.0-33.0    L       

      

 

             MEAN CELL HGB CONCETRATION (test code = MCHC) 31.0 gram/dL 33.0-36.

0    L             

 

             RED CELL DISTRIBUTION WIDTH (test code = RDW) 14.4 %       11.6-16.

2    N             

 

             RED CELL DISTRIBUTION WIDTH SD (test code = RDW-SD) 42.1 fL      37

.0-51.0    N             

 

             PLATELET COUNT (test code = PLT) 250 K/mm3    150-450      N       

      

 

             MEAN PLATELET VOLUME (test code = MPV) 10.7 fL      6.7-11.0     N 

            

 

             NEUTROPHIL % (test code = NT%) 41.9 %       39.0-69.0    N         

    

 

             IMMATURE GRANULOCYTE % (test code = IG%) 0.3 %        0.0-5.0      

N             

 

             LYMPHOCYTE % (test code = LY%) 47.7 %       25.0-55.0    N         

    

 

             MONOCYTE % (test code = MO%) 7.1 %        0.0-10.0     N           

  

 

             EOSINOPHIL % (test code = EO%) 2.3 %        0.0-5.0      N         

    

 

             BASOPHIL % (test code = BA%) 0.7 %        0.0-1.0      N           

  

 

             NUCLEATED RBC % (test code = NRBC%) 0.0 %        0-0          N    

         

 

             NEUTROPHIL # (test code = NT#) 2.40 K/mm3   1.8-7.7      N         

    

 

             IMMATURE GRANULOCYTE # (test code = IG#) 0.02 x10 3/uL 0-0.03      

 N             

 

             LYMPHOCYTE # (test code = LY#) 2.74 K/mm3   1.0-5.0      N         

    

 

             MONOCYTE # (test code = MO#) 0.41 K/mm3   0-0.8        N           

  

 

             EOSINOPHIL # (test code = EO#) 0.13 K/mm3   0.0-0.5      N         

    

 

             BASOPHIL # (test code = BA#) 0.04 K/mm3   0.0-0.2      N           

  

 

             NUCLEATED RBC # (test code = NRBC#) 0.00 K/mm3   0.0-0.1      N    

         

 

             MANUAL DIFF REQUIRED (test code = MDIFF) NO                        

              





- HEPA IMAG INCL GB W [id number] 12:28:00 FAX: Angel Ford MD        
              [place]:    St: ADM FAX:         Pete Johnson   [phone]
   FAX: Jayson Salazar [phone]   
------------------------------------------------------------------------------- 
Name:   SHANNA CRUMP                     [facility]                     :
1970  Age/S: 48/M           [address] [facility]                Unit #: 
[id number]      Loc: V.[address] [place] [address]  [address]              Phys: 
Pete Johnson MD                                              Acct: [id number]
[id number] Dis Date:               Status: ADM IN                                 
ONE #: [phone]     Exam Date:     2019     1209                   FAX
#: [phone]     Reason: ABDOMINAL PAIN FOR A MONTH                         
EXAMS:                                               CPT CODE:      [id number] HE
PA IMAG INCL GB W PHA                    [id number]                    HISTORY: Abdom
inal pain.               COMPARISON: Ultrasound from previous day.              
HIDA scan: 5.5 mCi of technetium 99m Choletec and 2 mcg of CCK.       Sequential
images obtained.               Homogeneous uptake within the liver. Excretion 
into the biliary system       as well as the gallbladder and small bowel. Ejecti
on fraction       calculated to 12% at 30 minutes. The normal should be greater 
than       35%.                 IMPRESSION:                   Depressed ejection
fraction of 12% at 30 minutes may suggest chronic         gallbladder dysmotili
ty and/or biliary dyskinesia.          ** Electronically Signed by RUSLAN dietrich on 2019 at 1228 **                      Reported and signed by: Wu Grande M.D.                      CC: Angel Gandhi MD; Pete Johnson MD; Jayson Zapata                                                                
              Technologist: ALBA SCHERER                                      
  Trnscrd Date/Time/By: 2019 (1222) : By: [email or web address]           Orig Print 
D/T: S: 2019 (5777)                         PAGE  1                       
Signed Report                               [id number] 12:24:00* 



             Test Item    Value        Reference Range Interpretation Comments

 

             GLUBED (test code = GLUBED) 216 mg/dL           H            

Performed by certified  

at [facility]





BASIC METABOLIC [id number] 07:59:00* 



             Test Item    Value        Reference Range Interpretation Comments

 

             SODIUM (test code = NA) 141 mmol/L   136-145      N             

 

             POTASSIUM (test code = K) 3.8 mmol/L   3.5-5.1      N             

 

             CHLORIDE (test code = CL) 107.0 mmol/L        N             

 

             CARBON DIOXIDE (test code = CO2) 30.0 mmol/L  21-32        N       

      

 

             ANION GAP (test code = GAP) 7.8          10-20        L            

 

 

             GLUCOSE (test code = GLU) 149 mg/dL           H             

 

             BLOOD UREA NITROGEN (test code = BUN) 7 mg/dL      7-18         N  

           

 

             GLOMERULAR FILTRATION RATE (test code = GFR) > 60 mL/min  >=60     

                 Estimated GFR by

using Modified MDRD formula.Chronic kidney disease is defined as either kidney 
damageor GFR <60 mL/min/1.73 m2 for >3 months.

 

             CREATININE (test code = CREAT) 0.80 mg/dL   0.7-1.3      N         

    

 

             BUN/CREATININE RATIO (test code = BUN/CREA) 9.0          10-20     

   L             

 

             CALCIUM (test code = CA) 8.8 mg/dL    8.5-10.1     N             





BASIC METABOLIC [id number] 07:58:00* 



             Test Item    Value        Reference Range Interpretation Comments

 

             SODIUM (test code = NA) 141 mmol/L   136-145      N             

 

             POTASSIUM (test code = K) 3.8 mmol/L   3.5-5.1      N             

 

             CHLORIDE (test code = CL) 107.0 mmol/L        N             

 

             CARBON DIOXIDE (test code = CO2)  mmol/L      21-32                

      

 

             ANION GAP (test code = GAP)              10-20                     

 

 

             GLUCOSE (test code = GLU)  mg/dL                            

 

             BLOOD UREA NITROGEN (test code = BUN)  mg/dL       7-18            

           

 

             GLOMERULAR FILTRATION RATE (test code = GFR)  mL/min      >=60     

                  

 

             CREATININE (test code = CREAT)  mg/dL       0.7-1.3                

    

 

             BUN/CREATININE RATIO (test code = BUN/CREA)              10-20     

                 

 

             CALCIUM (test code = CA)  mg/dL       8.5-10.1                   





[id number] 06:05:00* 



             Test Item    Value        Reference Range Interpretation Comments

 

             GLUBED (test code = GLUBED) 160 mg/dL           H            

Performed by certified  

at [facility]





[id number] 04:12:00* 



             Test Item    Value        Reference Range Interpretation Comments

 

             GLUBED (test code = GLUBED) 226 mg/dL           H            

Performed by certified  

at [facility]





[id number] 20:20:00* 



             Test Item    Value        Reference Range Interpretation Comments

 

             GLUBED (test code = GLUBED) 205 mg/dL           H            

Performed by certified  

at [facility]





-  ABDOMEN [id number] 19:33:00  Name: SHANNA CRUMP                      
[facility]                     : 1970 Age/S: 48  / M         [address] 
[facility]                Unit #: [id number]     Loc:               [place]  [address]              Phys: Pete Johnson MD                             
                Acct: [id number]  Dis Date:               Status: ADM IN      
                           PHONE #: [phone]     Exam Date: 2019  1465
                    FAX #: [phone]      Reason: ABDOMINAL PAIN             
                        EXAMS:                                               CPT
CODE:      [id number] US ABDOMEN LTD                             [address]           
        EXAM: Ultrasound abdomen, limited;               INFORMATION: Abdominal 
pain;               FINDINGS:       Liver is is of normal size and shape. It 
shows diffusely increased       echogenicity; no focal lesions.       The 
gallbladder is of normal diameter and wall thickness; a stone is       seen in 
the neck of the gallbladder and sludge is also seen within the       
gallbladder.       No dilatation of intra or extrahepatic bile ducts.       The 
pancreas is unremarkable.       The right kidney is of normal size and shape; no
hydronephrosis, no       stones and no parenchymal abnormalities.       The 
right kidney measures 10.4 x 6 x 4.8 cm.       Imaged portions of the IVC and 
abdominal aorta are unremarkable.                 IMPRESSION:         1. 
Cholecystolithiasis and sludge within the gallbladder.         2. Fatty liver.  
                ** Electronically Signed by RUSLAN Falk **           **
            on 2019 at 1933             **                      Reported 
and signed by: Hardeep Falk M.D.                   CC: Angel Gandhi MD; 
Pete Johnson MD; Jayson Caal                                       
                                       Technologist: Trish King RDMS  
                           [facility] Date/Time: 2019 () ManeGRW        
               Orig Print D/T: S: 2019 ()     Probe:                  
    PAGE  1                       Signed Report                               
HEPATIC FUNCTION [id number] 18:42:00* 



             Test Item    Value        Reference Range Interpretation Comments

 

             TOTAL PROTEIN (test code = PROT) 6.8 gram/dL  6.4-8.2      N       

      

 

             ALBUMIN (test code = ALB) 3.7 g/dL     3.4-5.0      N             

 

             GLOBULIN (test code = GLOB) 3.1 gram/dL  2.7-4.2      N            

 

 

             ALBUMIN/GLOBULIN RATIO (test code = A/G) 1.2          0.75-1.50    

N             

 

             BILIRUBIN TOTAL (test code = BILT) 0.30 mg/dL   0.0-1.0      N     

        

 

             BILIRUBIN DIRECT (test code = BILD) 0.08 mg/dL   0.0-0.20     N    

         

 

             SGOT/AST (test code = AST) 19 IUnit/L   15-37        N             

 

             SGPT/ALT (test code = ALT) 23 IUnit/L   12-78        N             

 

             ALKALINE PHOSPHATASE TOTAL (test code = ALKP) 82 IUnit/L     

     N            **Note change 

in reference range due to change in reagent.**





[id number] 17:16:00* 



             Test Item    Value        Reference Range Interpretation Comments

 

             GLUBED (test code = GLUBED) 234 mg/dL           H            

Performed by certified  

at [facility]





[id number] 12:01:00* 



             Test Item    Value        Reference Range Interpretation Comments

 

             GLUBED (test code = GLUBED) 258 mg/dL           H            

Performed by certified  

at [facility]





- XR [id number] 10:53:00 FAX: Angel Ford MD                   
   [place]:    St: [facility] FAX: Jayson Salazar [phone]   
------------------------------------------------------------------------------- 
Name:   SHANNA CRUMP                     [facility]                     :
1970  Age/S: 48/M           [address] [facility]                Unit #: 
[id number]      Loc: [address]       [place]  [address]              Phys: 
Angel Gandhi MD                                                      Acct: 
[id number] Dis Date:               Status: ADM IN                             
   PHONE #: [phone]     Exam Date:     2019     1019                 
 FAX #: [phone]     Reason: Hx Francie-en-Y bypass. Abd pain. Eval per surgeon
   EXAMS:                                               CPT CODE:      [id number]
XR ESOPHAGUS                               [address]                    HISTORY: 
Difficulty swallowing.               COMPARISON: None available.               
Single contrast esophagram.               Esophagus is distended well with free 
passage of contrast. No       obstruction is noted. No aspiration was visible 
during the study. The       peristalsis was normal. There is free passage 
through the GE junction       into the Francie-en-Y.                 IMPRESSION:   
               No esophageal obstruction or constriction with free passage of   
     contrast in its entirety with normal peristalsis.                   
Fluoroscopy time utilized was 0.4 minutes and 17 images were obtained         
during the study.          ** Electronically Signed by RUSLAN Grande on 
2019 at 1053 **                      Reported and signed by: Wu Grande M.D.                     CC: Angel Gandhi MD; Jayson Caal                  
                                                                                
   Technologist: Daniela Marvin RT(R)                                   Trnscrd 
Date/Time/By: 2019 (5513) : By: [email or web address]           Orig Print D/T: S: 
2019 (4714)                         PAGE  1                       Signed 
Report                               [id number] 05:14:00* 



             Test Item    Value        Reference Range Interpretation Comments

 

             GLUBED (test code = GLUBED) 196 mg/dL           H            

Performed by certified  

at [facility]





URINALYSIS [id number] 00:38:00* 



             Test Item    Value        Reference Range Interpretation Comments

 

             UA COLOR (test code = COLU) LIGHT YELLOW YELLOW                    

 

 

             UA APPEARANCE (test code = APPU) CLEAR        CLEAR                

      

 

             UA GLUCOSE DIPSTICK (test code = DGLUU) >=500 mg/dL  NEGATIVE     A

             

 

             UA BILIRUBIN DIPSTICK (test code = BILU) NEGATIVE mg/dL NEGATIVE   

                

 

             UA KETONE DIPSTICK (test code = KETU) 80 mg/dL     NEGATIVE     A  

           

 

             UA SPECIFIC GRAVITY (test code = SGU) 1.015        1.001-1.035     

           

 

             UA BLOOD DIPSTICK (test code = SHWETA) Negative     NEGATIVE          

         

 

             UA PH DIPSTICK (test code = KYM) 5.0          5.0-8.0              

      

 

             UA PROTEIN DIPSTICK (test code = PROU) Negative mg/dL NEGATIVE     

              

 

             UA UROBILINIOGEN DIPSTICK (test code = URO) NEGATIVE mg/dL NEGATIVE

                   

 

             UA NITRITE DIPSTICK (test code = RADHA) NEGATIVE     NEGATIVE       

            

 

             UA LEUKOCYTE ESTERASE W REFLEX (test code = LEUUR) NEGATIVE     NEG

ATIVE                   

 

             UA WBC (test code = WBCU) 0-5 #/HPF    0-5                        

 

             UA RBC (test code = RBCU) 0-2 #/HPF    0-5                        

 

             UA HYALINE CAST (test code = HYALU) 0-2 #/LPF    0-5               

         

 

             UA MUCUS (test code = MUCU) FEW #/LPF    FEW                       

 





Urine Source? Clean Catch- CT ABD PELVIS [id number] 00:38:00  Name: 
SHANNA CRUMP                      [facility]                     : 
1970 Age/S: 48  / M         [address]                Unit #: [id number]
[id number]     Loc:               [place]  [address]              Phys: WILLY Soto  DO                                                 Acct: [id number]  Di
s Date:               Status: REG ER                                  PHONE #: [phone]
[phone]     Exam Date: 2019  0015                     FAX #: [phone]
[phone]      Reason: ABD pain, h/o Francie-en-Y bypass, r/o obstruction     EXAMS:     
                                         CPT CODE:      [id number] CT ABD PELVIS 
W/CONT                       [address]                            EXAM:  - CT ABD P
MELBA W/CONT               HISTORY: Abdominal pain.               TECHNIQUE: Axi
al tomograms through the abdomen and pelvis were       obtained after enteric an
d intravenous contrast.         Coronal and sagittal reformatted images are prov
ided.       This exam was performed according to our departmental       dose-opt
imization program, which includes automated exposure control,       adjustment o
f the mA and/or kV according to patient size and/or use of       iterative recon
struction technique.               COMPARISON: 2016.               FI
NDINGS:               The visualized lung bases are clear.       Previous gastri
c surgical changes are present.       There is no fluid collection or free intra
peritoneal air.               The liver, spleen, pancreas, adrenal glands and ki
dneys demonstrate no       significant abnormalities.       There is no evidence
of renal calculi or hydronephrosis.               The appendix is not identifie
d. The bowel is unremarkable.       The bowel is not distended.               Th
ere is no adenopathy or free fluid.               The osseous structures demonst
rate degenerative change without focal       lesion.        Degenerative disc di
sease at L4-L5 level of lumbar spine.                Abdominal aorta is normal i
n size.                         IMPRESSION:                    No significant ab
normalities demonstrated.                   PAGE  1                       Signed
Report                    (CONTINUED)   Name: SHANNA CRUMP                      
[facility]                     : 1970 Age/S: 48  / M         [address] [facility]                Unit #: [id number]     Loc:               [place]  [address]              Phys: Sobia Soto DO                                
                Acct: [id number]  Dis Date:               Status: REG ER      
                           PHONE #: [phone]     Exam Date: 2019  0015
                    FAX #: [phone]      Reason: ABD pain, h/o Francie-en-Y 
bypass, r/o obstruction     EXAMS:                                              
CPT CODE:      [id number] CT ABD PELVIS W/CONT                       [address]       
       <Continued>      ** Electronically Signed by Armani Medel MD on 
2019 at 0038 **                      Reported and signed by: 
Armani Medel MD                                       CC: Jayson Caal Lauren DO                                                             
                                    Technologist:MALISSA CARDENAS       
CTDI:        DLP:        Trnscb Date/Time: 2019 (38) ManeMKM4         
             Orig Print D/T: S: 2019 (0048)       CTDI:          DLP:     
    PAGE  2                       Signed Report                               
URINALYSIS [id number] 00:24:00* 



             Test Item    Value        Reference Range Interpretation Comments

 

             UA COLOR (test code = COLU) LIGHT YELLOW YELLOW                    

 

 

             UA APPEARANCE (test code = APPU) CLEAR        CLEAR                

      

 

             UA BILIRUBIN DIPSTICK (test code = BILU) NEGATIVE mg/dL NEGATIVE   

                

 

             UA SPECIFIC GRAVITY (test code = SGU) 1.015        1.001-1.035     

           

 

             UA BLOOD DIPSTICK (test code = SHWETA) Negative     NEGATIVE          

         

 

             UA PH DIPSTICK (test code = KYM) 5.0          5.0-8.0              

      

 

             UA PROTEIN DIPSTICK (test code = PROU) Negative mg/dL NEGATIVE     

              

 

             UA UROBILINIOGEN DIPSTICK (test code = URO) NEGATIVE mg/dL NEGATIVE

                   

 

             UA NITRITE DIPSTICK (test code = RADHA) NEGATIVE     NEGATIVE       

            

 

             UA LEUKOCYTE ESTERASE W REFLEX (test code = LEUUR) NEGATIVE     NEG

ATIVE                   

 

             UA WBC (test code = WBCU)  per HPF     0-5                        





Urine Source? Clean CatchBASIC METABOLIC [id number] 23:35:00* 



             Test Item    Value        Reference Range Interpretation Comments

 

             SODIUM (test code = NA) 138 mmol/L   136-145      N             

 

             POTASSIUM (test code = K) 4.0 mmol/L   3.5-5.1      N             

 

             CHLORIDE (test code = CL) 105.0 mmol/L        N             

 

             CARBON DIOXIDE (test code = CO2) 24.0 mmol/L  21-32        N       

      

 

             ANION GAP (test code = GAP) 13.0         10-20        N            

 

 

             GLUCOSE (test code = GLU) 207 mg/dL           H             

 

             BLOOD UREA NITROGEN (test code = BUN) 9 mg/dL      7-18         N  

           

 

             GLOMERULAR FILTRATION RATE (test code = GFR) > 60 mL/min  >=60     

                 Estimated GFR by

using Modified MDRD formula.Chronic kidney disease is defined as either kidney 
damageor GFR <60 mL/min/1.73 m2 for >3 months.

 

             CREATININE (test code = CREAT) 0.80 mg/dL   0.7-1.3      N         

    

 

             BUN/CREATININE RATIO (test code = BUN/CREA) 11.7         10-20     

   N             

 

             CALCIUM (test code = CA) 8.9 mg/dL    8.5-10.1     N             





HEPATIC FUNCTION [id number] 23:35:00* 



             Test Item    Value        Reference Range Interpretation Comments

 

             TOTAL PROTEIN (test code = PROT) 7.5 gram/dL  6.4-8.2      N       

      

 

             ALBUMIN (test code = ALB) 4.2 g/dL     3.4-5.0      N             

 

             GLOBULIN (test code = GLOB) 3.3 gram/dL  2.7-4.2      N            

 

 

             ALBUMIN/GLOBULIN RATIO (test code = A/G) 1.3          0.75-1.50    

N             

 

             BILIRUBIN TOTAL (test code = BILT) 0.40 mg/dL   0.0-1.0      N     

        

 

             BILIRUBIN DIRECT (test code = BILD) 0.11 mg/dL   0.0-0.20     N    

         

 

             SGOT/AST (test code = AST) 25 IUnit/L   15-37        N             

 

             SGPT/ALT (test code = ALT) 26 IUnit/L   12-78        N             

 

             ALKALINE PHOSPHATASE TOTAL (test code = ALKP) 93 IUnit/L     

     N            **Note change 

in reference range due to change in reagent.**





[id number] 23:35:00* 



             Test Item    Value        Reference Range Interpretation Comments

 

             LIPASE (test code = LIP) 70 U/L       73.0-393.0   L             





[id number] 23:35:00* 



             Test Item    Value        Reference Range Interpretation Comments

 

             TROPONIN-I (test code = TROPI) <0.015 ng/mL 0-0.045      N         

    





LACTIC [id number] 23:21:00* 



             Test Item    Value        Reference Range Interpretation Comments

 

             LACTIC ACID (test code = LACT) 1.1 mmol/L   0.4-1.9      N         

    





BASIC METABOLIC [id number] 23:16:00* 



             Test Item    Value        Reference Range Interpretation Comments

 

             SODIUM (test code = NA) 138 mmol/L   136-145      N             

 

             POTASSIUM (test code = K) 4.0 mmol/L   3.5-5.1      N             

 

             CHLORIDE (test code = CL) 105.0 mmol/L        N             

 

             CARBON DIOXIDE (test code = CO2)  mmol/L      21-32                

      

 

             ANION GAP (test code = GAP)              10-20                     

 

 

             GLUCOSE (test code = GLU)  mg/dL                            

 

             BLOOD UREA NITROGEN (test code = BUN)  mg/dL       7-18            

           

 

             GLOMERULAR FILTRATION RATE (test code = GFR)  mL/min      >=60     

                  

 

             CREATININE (test code = CREAT)  mg/dL       0.7-1.3                

    

 

             BUN/CREATININE RATIO (test code = BUN/CREA)              10-20     

                 

 

             CALCIUM (test code = CA)  mg/dL       8.5-10.1                   





HEPATIC FUNCTION [id number] 23:16:00* 



             Test Item    Value        Reference Range Interpretation Comments

 

             TOTAL PROTEIN (test code = PROT)  gram/dL     6.4-8.2              

      

 

             ALBUMIN (test code = ALB)  g/dL        3.4-5.0                    

 

             GLOBULIN (test code = GLOB)  gram/dL     2.7-4.2                   

 

 

             ALBUMIN/GLOBULIN RATIO (test code = A/G)              0.75-1.50    

              

 

             BILIRUBIN TOTAL (test code = BILT)  mg/dL       0.0-1.0            

        

 

             BILIRUBIN DIRECT (test code = BILD)  mg/dL       0.0-0.20          

         

 

             SGOT/AST (test code = AST)  IUnit/L     15-37                      

 

             SGPT/ALT (test code = ALT)  IUnit/L     12-78                      

 

             ALKALINE PHOSPHATASE TOTAL (test code = ALKP)  IUnit/L       

                   





[id number] 23:16:00* 



             Test Item    Value        Reference Range Interpretation Comments

 

             LIPASE (test code = LIP)  U/L         73.0-393.0                 





[id number] 23:16:00* 



             Test Item    Value        Reference Range Interpretation Comments

 

             TROPONIN-I (test code = TROPI)  ng/mL       0-0.045                

    





CBC W/O [id number] 22:58:00* 



             Test Item    Value        Reference Range Interpretation Comments

 

             WHITE BLOOD CELL (test code = WBC) 5.3 K/mm3    4.5-12.5     N     

        

 

             RED BLOOD CELL (test code = RBC) 4.61 mill/mm3 4.0-5.8      N      

       

 

             HEMOGLOBIN (test code = HGB) 11.9 gram/dL 13.0-17.5    L           

  

 

             HEMATOCRIT (test code = HCT) 38.1 %       42.0-52.0    L           

  

 

             MEAN CELL VOLUME (test code = MCV) 82.6 fL      80-98        N     

        

 

             MEAN CELL HGB (test code = MCH) 25.8 picogram 27.0-33.0    L       

      

 

             MEAN CELL HGB CONCETRATION (test code = MCHC) 31.2 gram/dL 33.0-36.

0    L             

 

             RED CELL DISTRIBUTION WIDTH (test code = RDW) 13.5 %       11.6-16.

2    N             

 

             PLATELET COUNT (test code = PLT) 127 K/mm3    150-450      L       

      

 

             MEAN PLATELET VOLUME (test code = MPV) 12.2 fL      6.7-11.0     H 

            





- XR CHEST 1 [birth year]03[birth year] 22:07:00 FAX: Jayson Salazar [phone]
   [place]:    St: PRE FAX:         Sobia Soto  DO                 
------------------------------------------------------------------------------- 
Name:   SHANNA CRUMP                     [facility]                     :
1970  Age/S: 48/M           [address] [facility]                Unit #: 
[id number]      Loc: [place]  [address]              Phys: 
Sobia Soto DO                                                 Acct: 
[id number] Dis Date:               Status: PRE ER                             
   PHONE #: [phone]     Exam Date:     2019     1003                 
 FAX #: [phone]     Reason: ABDOMINAL PAIN                                 
   EXAMS:                                               CPT CODE:      [id number]
XR CHEST 1 V                               [id number]                    EXAM: Chest 
X-ray, 1 view;               CLINICAL HISTORY: Severe abdominal pain and fever; 
             FINDINGS:       The lungs are clear,  no infiltrates, no edema;    
   no effusions; no pneumothorax;       normal cardiomediastinal silhouette.    
  No free subdiaphragmatic air.                 IMPRESSION:          Normal 
chest x-ray.                             ** Electronically Signed by RUSLAN Falk **           **             on 2019 at 2207             **
                     Reported and signed by: Hardeep Falk M.D.              
          CC: Jayson Caal Lauren DO                            
                                                                     
Technologist: Daniela Marvin RT(R); DAYA MUÑOZ RT (R)    Trnscrd 
Date/Time/By: 2019 () : By: Katherine           Orig Print D/T: S: 
2019 (9698)                         PAGE  1                       Signed 
Report                               ABDOMEN-1VIEW (KUB)2018 14:27:00    
[facility]   [address] [address]      Patient Name: SHANNA CRUMP JR   MR #: [id number]    
: 1970 Age/Sex: 48/M  Acct #: [id number] Req #: [id number]  Adm Physicia
n:     Ordered by: CLOVIS ROSADO MD  Report #: [id number]   Location: [facility]  Room
/Bed:     ______________________________________________________________________
_____________________________    Procedure: [id number] DX/ABDOMEN-1VIEW (DELMAR)  Ex
am Date:                             Exam Time:        REPORT STATUS: Signed    
EXAM: Abdomen 1  Views   INDICATION:           COMPARISON: None      FINDINGS:  
The diaphragms and more superior aspect of the abdomen are not included in the  
exam.   Mild to moderate amount of stool in the colon. Air-filled loops of bowel
  overlying the kidneys.   No dilated loops of small bowel.      No renal carlos
culi. Few tiny radiopacities overlying the right upper quadrant may   represent 
an artifact.      No abnormal soft tissue masses.      Mild degenerative changes
in the lumbar spine and pelvis.      IMPRESSION:   1.  Suboptimal evaluation of 
the abdomen and pelvis due to overlying bowel gas   and stools.   2.  No abnorm
al calcifications overlying the visualized renal shadows and   pelvis. If clinic
al concern for renal stone, consider CT abdomen and pelvis   without contrast re
nal stone protocol.      Signed by: Dr. Anitra Schmidt M.D. on 20
18 2:30 PM        Dictated By: ANITRA SCHMIDT MD  Electronically Signed
By: ANITRA SCHMIDT MD on 18 1430  Transcribed By: THIEN on  1430       COPY TO:   CLOVIS ROSADO MD        ABDOMEN-1VIEW (KUB)    [address] [id number]      Patient Name: SHANNA CRUMP JR   MR #: [id number]    : 
1970 Age/Sex: 48/M  Acct #: [id number] Req #: [id number]  [facility] Physician:
    Ordered by: PEDRO ROSADO MD  Report #: [id number]   Location: [facility]  Room/Bed:  
  __________________________________________________________________________
_________________________    Procedure: [id number] DX/ABDOMEN-1VIEW (KUB)  Exam D
ate: 05/10/18                            Exam Time: 1230       REPORT STATUS: Si
gned    PROCEDURE:   X-RAY ABDOMEN - KUB       COMPARISON:   None.       INDICAT
IONS:   CALCULUS OF KIDNEY       FINDINGS:      There is a non-obstructed bowel-
gas pattern. Moderate amount of stool.    Left upper quadrant surgical clips and
bowel sutures. Several punctate    radiodensity overlying the left renal shadow 
but adjacent to these    surgical clips. There are no acute osseous abnormaliti
es. The lung    bases are clear.       CONCLUSION:      Several punctate radiode
nsity overlying the left renal shadow. However,    this is adjacent to the surgi
carlos clips. This is indeterminate and may    represent renal stones versus prior 
postsurgical changes.        Dictated by:  Fawad Monson M.D. on 5/10/2018 at 12:59  
     Electronically approved by:  Fawad Monson M.D. on 5/10/2018 at 12:59           
    Dictated By: FAWAD MONSON MD  Electronically Signed By: FAWAD MONSON MD on 05/10/18 
4814  Transcribed By: NOBLE on 05/10/18 1258       COPY TO:   PEDRO ROSADO MD

## 2020-05-16 NOTE — XMS REPORT
Summary of Care: 19 - 19

                             Created on: 2048



SHANNA CRUMP JR

External Reference #: [id number]

: 1970

Sex: Male



Demographics





                          Address                   

[place]  [address]

 

                          Home Phone                [phone]

 

                          Preferred Language        English

 

                          Marital Status            

 

                          Scientology Affiliation     None

 

                          Race                      White

 

                                        Additional Race(s)  

 

                          Ethnic Group              Unknown





Author





                          Author                    [facility]

 

                          Organization              [facility]

 

                          Address                   Unknown

 

                          Phone                     Unavailable







Encounter





HQ Lucía_garth(FIN) [id number] Date(s): 19 - 19

[facility] [place] [address] [address]      [phone]
[phone]

Discharge Disposition: Home or Self Care

Attending Physician: Beatrice Eubanks MD

Referring Physician: Beatrice Eubanks MD





Vital Signs





No data available for this section



Problem List





    



              Condition    Effective Dates  Status       Health Status  Informan

t

 

    



                           Anxiety(Confirmed)        Active  

 

    



                           Back pain(Confirmed)      Active  

 

    



                           Diabetes                  Active  



                                         mellitus(Confirmed)    

 

    



                           Depression(Confirmed      Active  



                                         )    

 

    



                           NOEMI (obstructive          Active  



                                         sleep    



                                         apnea)(Confirmed)    

 

    



                           Pain(Confirmed)           Active  

 

    



                           Cervical stenosis of      Active  



                                         spine(Confirmed)    







Allergies, Adverse Reactions, Alerts





No Known Medication Allergies



Medications





No data available for this section



Results





No data available for this section



Immunizations





Given and Recorded



   



                 Vaccine         Date            Status          Refusal Reason

 

   



                     pneumococcal 23-valent vaccine  17              Given 







Procedures





    



              Procedure    Date         Related Diagnosis  Body Site    Status

 

    



                           Gastric bypass            Completed

 

    



                           Operation                 Completed

 

    



                           Operation                 Completed

 

    



                           Tonsillectomy and adenoidectomy     Completed







Social History





 



                           Social History Type       Response

 

 



                           Smoking Status            Never smoker; Exposure to T

obacco Smoke None; Cigarette Smoking

Last 365



                                         Days No; Reg Smoking Cessation Counseli

ng No



                                         entered on: 17







Assessment and Plan





No data available for this section

## 2020-05-16 NOTE — XMS REPORT
Summary of Care: 4/16/15 - 4/17/15

                             Created on: 06/10/2107



SHANNA CRUMP JR

External Reference #: [id number]

: 1970

Sex: Male



Demographics





                          Address                   [address] [place] [address]  [phone]

 

                          Home Phone                [phone]

 

                          Preferred Language        English

 

                          Marital Status            

 

                          Taoist Affiliation     None

 

                          Race                      White/

 

                          Ethnic Group              Non-





Author





                          Organization              Unknown

 

                          Address                   Unknown

 

                          Phone                     Unavailable







Encounter





HQ Thelma(SAMPSON) [id number] Date(s): 4/16/15 - 4/17/15

[facility] [address] [employer] provided by         
  [employer]       [place] [address]-    
[phone]

Final:  

Discharge Disposition: Home

Physician Attending: Jose Watt DO

Physician Admitting: Jose Watt DO

Physician_Referring: Caroline Mark MD





Vital Signs





                    1                   2                   3



                                         Most recent to   



                                         oldest [Reference   



                                         Range]:   

 

                                        177.8 cm 

                          (4/16/15 12:13 PM)        177.8 cm 

                          (4/14/15 12:15 PM)         



                                         Height   

 

                                        98.1 DegF 

                          (4/17/15 4:51 AM)         98.4 DegF 

                          (4/16/15 11:52 PM)        98.2 DegF 

                                        (4/16/15 7:55 PM)



                                         Temperature Oral   



                                         [96.4-99.1 DegF]   

 

                                        105/67 mmHg 

                          (4/17/15 4:51 AM)         105/68 mmHg 

                          (4/16/15 11:52 PM)        123/72 mmHg 

                                        (4/16/15 7:55 PM)



                                         Blood Pressure   



                                         [/60-90 mmHg]   

 

                                        20 BRMIN 

                          (4/17/15 4:51 AM)         18 BRMIN 

                          (4/16/15 11:52 PM)        18 BRMIN 

                                        (4/16/15 10:15 PM)



                                         Respiratory Rate   



                                         [14-20 BRMIN]   

 

                                        76 bpm 

                          (4/17/15 4:51 AM)         77 bpm 

                          (4/16/15 11:52 PM)        89 bpm 

                                        (4/16/15 7:55 PM)



                                         Peripheral Pulse   



                                         Rate [ bpm]   

 

                                        88.636 kg 

                          (4/16/15 5:14 PM)         88.636 kg 

                          (4/16/15 12:13 PM)        90.909 kg 

                                        (4/14/15 12:15 PM)



                                         Weight   

 

                                        28.04 m2 

                          (4/16/15 12:13 PM)        28.76 m2 

                          (4/14/15 12:15 PM)         



                                         Body Mass Index   







Problem List





    



              Condition    Effective Dates  Status       Health Status  Informan

t

 

    



                           Back pain(Confirmed)      Active  

 

    



                           Diabetes                  Active  



                                         mellitus(Confirmed)    

 

    



                           Pain(Confirmed)           Active  







Allergies, Adverse Reactions, Alerts





   



                 Substance       Reaction        Severity        Status

 

   



                           NKDA                      Active







Medications





Ambien

10 mg, 2 tab, Route: PO, Drug form: TAB, Bedtime, Dosing Weight 88.636, kg, PRN 
Insomnia, Start date: 04/16/15 13:15:00, Duration: 30 day, Stop date: 05/16/15 1
3:14:00

Notes: (Same As: Ambien)

Start Date: 4/16/15

Stop Date: 4/17/15

Status: Discontinued



Ancef

2 gm, Route: IVPB, Drug form: INJ, ONCE, Dosing Weight 88.636, kg, Start date: 0
4/16/15 13:12:00, Duration: 1 doses or times, Stop date: 04/16/15 13:12:00

Start Date: 4/16/15

Stop Date: 4/16/15

Status: Completed



benzocaine-menthol topical

1 lozenge, Route: MUCOUS MEM, Drug Form: ALEXIS, Q2H, PRN Sore Throat, Start date: 
04/16/15 14:58:00, Duration: 30 day, Stop date: 05/16/15 14:57:00

Notes: Cepacol lozengesDispense 1 box = 16 lozenges  (Same As: Cepacol Lozenges)

Start Date: 4/16/15

Stop Date: 4/17/15

Status: Discontinued



Calcium 600 +D oral tablet

1 tab, PO, Daily

Start Date: 4/16/15

Status: Ordered



ceFAZolin

2 gm, 50 mL, Route: IVPB, Drug form: INJ, PRE OP, Start date: 04/16/15 7:00:00, 
Duration: 1 day, Stop date: 04/17/15 6:59:00

Start Date: 4/16/15

Stop Date: 4/17/15

Status: Discontinued



ceFAZolin (SCIP) + Sodium Chloride 0.9%  mL

2 gm, Route: IVPB, Drug form: INJ, Q8H, Dosing Weight 88.636, kg, Start date: 04
/16/15 20:30:00, Stop date: 04/17/15 12:30:00

Notes: (Same As: Ancef, Kefzol)Cefazolin **FOR IV SET ONLY**  *** MEDICATION WAS
TE ***Product Size:  1000 mgProduct Wasted:  ___ mg

Start Date: 4/16/15

Stop Date: 4/17/15

Status: Discontinued



Cepacol Lozenge

1 lozenge, Route: MUCOUS MEM, Q2H, Drug form: ALEXIS, PRN Sore Throat, Start date: 
04/16/15 13:15:00, Duration: 30 day, Stop date: 05/16/15 13:14:00

Start Date: 4/16/15

Stop Date: 4/16/15

Status: Discontinued



Colace 100 mg oral capsule

100 mg, 1 cap, Route: PO, Drug form: CAP, BID, Dosing Weight 88.636, kg, Start d
ate: 04/16/15 17:00:00, Duration: 30 day, Stop date: 05/16/15 9:00:00

Notes: (Same as: Colace) (Do Not Crush)

Start Date: 4/16/15

Stop Date: 4/17/15

Status: Discontinued



dexamethasone

4 mg, 1 mL, Route: IVP, Drug form: INJ, Q12H, Dosing Weight 88.636, kg, Start da
te: 04/16/15 21:00:00, Duration: 1 day, Stop date: 04/17/15 9:00:00

Notes: Concentration: 4mg/ml

Start Date: 4/16/15

Stop Date: 4/17/15

Status: Completed



Dextrose 50% Syringe

12.5 gm, 25 mL, Route: IVP, Drug Form: INJ, Dosing Weight 88.636, kg, PRN, PRN B
lood Glucose Results, Start date: 04/16/15 13:20:00, Duration: 30 day, Stop date
: 05/16/15 13:19:00

Start Date: 4/16/15

Stop Date: 4/17/15

Status: Discontinued



Dextrose 50% Syringe

25 gm, 50 mL, Route: IVP, Drug Form: INJ, Dosing Weight 88.636, kg, PRN, PRN Blo
od Glucose Results, Start date: 04/16/15 13:20:00, Duration: 30 day, Stop date: 
05/16/15 13:19:00

Start Date: 4/16/15

Stop Date: 4/17/15

Status: Discontinued



Dilaudid

0.5 mg, 0.25 mL, Route: IV, Drug form: INJ, Q3H, Dosing Weight 88.636, kg, PRN P
ain Score 7-10, Start date: 04/16/15 13:20:00, Duration: 30 day, Stop date: 05/1
6/15 13:19:00

Notes: Same as: Dilaudid

Start Date: 4/16/15

Stop Date: 4/17/15

Status: Discontinued



Dilaudid

0.2 mg, 0.1 mL, Route: IV, Drug form: INJ, Q3H, Dosing Weight 88.636, kg, PRN Pa
in Score 4-6, Start date: 04/16/15 13:19:00, Duration: 30 day, Stop date: 05/16/
15 13:18:00

Notes: Same as: Dilaudid

Start Date: 4/16/15

Stop Date: 4/17/15

Status: Discontinued



Flexeril

5 mg, PO, TID, PRN Muscle Spasm, # 30 tab, 0 Refill(s)

Start Date: 4/14/15

Stop Date: 4/24/15

Status: Ordered



flumazenil

0.2 mg, 2 mL, Route: IVP, Drug form: INJ, PRN, Dosing Weight 88.636, kg, PRN Cristino
zodiazepine Reversal, Initial dose, Start date: 04/16/15 13:56:00, Duration: 30 
day, Stop date: 05/16/15 13:55:00

Notes: (Same as: Romazicon)

Start Date: 4/16/15

Stop Date: 4/16/15

Status: Discontinued



glucagon

1 mg, Route: IM, Drug form: PDR/INJ, PRN, Dosing Weight 88.636, kg, PRN Blood Gl
ucose Results, Start date: 04/16/15 13:20:00, Duration: 30 day, Stop date: 05/16
/15 13:19:00

Start Date: 4/16/15

Stop Date: 4/17/15

Status: Discontinued



hydromorphone

0.5 mg, 0.25 mL, Route: IVP, Drug form: INJ, Q5Min, Dosing Weight 88.636, kg, AR
N Pain Score 7-10, Start date: 04/16/15 13:56:00, Duration: 4 doses or times, St
op date: 04/17/15 0:00:00

Notes: Same as: Dilaudid

Start Date: 4/16/15

Stop Date: 4/16/15

Status: Completed



Insulin regular

10 unit, 0.1 mL, Route: SUB-Q, Drug form: SOLN, TID-Before Meals, Dosing Weight 
88.636, kg, PRN Blood Glucose Results, Start date: 04/16/15 13:20:00, Duration: 
30 day, Stop date: 05/16/15 13:19:00

Notes: (Same as: Humulin R) Roll in palms of hands gently;  Do not shake vigorou
sly. "single patient use only"(Restricted to patients requiring a dose >  60 
units)  Stable for 28 days at room temperatureExpires in ______ days from _
_____________Date

Start Date: 4/16/15

Stop Date: 4/17/15

Status: Discontinued



Insulin regular

4 unit, 0.04 mL, Route: SUB-Q, Drug form: SOLN, TID-Before Meals, Dosing Weight 
88.636, kg, PRN Blood Glucose Results, Start date: 04/16/15 13:20:00, Duration: 
30 day, Stop date: 05/16/15 13:19:00

Notes: (Same as: Humulin R) Roll in palms of hands gently;  Do not shake vigorou
sly. "single patient use only"(Restricted to patients requiring a dose >  60 
units)  Stable for 28 days at room temperatureExpires in ______ days from _
_____________Date

Start Date: 4/16/15

Stop Date: 4/17/15

Status: Discontinued



Insulin regular

2 unit, 0.02 mL, Route: SUB-Q, Drug form: SOLN, TID-Before Meals, Dosing Weight 
88.636, kg, PRN Blood Glucose Results, Start date: 04/16/15 13:20:00, Duration: 
30 day, Stop date: 05/16/15 13:19:00

Notes: (Same as: Humulin R) Roll in palms of hands gently;  Do not shake vigorou
sly. "single patient use only"(Restricted to patients requiring a dose >  60 
units)  Stable for 28 days at room temperatureExpires in ______ days from _
_____________Date

Start Date: 4/16/15

Stop Date: 4/17/15

Status: Discontinued



Insulin regular

8 unit, 0.08 mL, Route: SUB-Q, Drug form: SOLN, TID-Before Meals, Dosing Weight 
88.636, kg, PRN Blood Glucose Results, Start date: 04/16/15 13:20:00, Duration: 
30 day, Stop date: 05/16/15 13:19:00

Notes: (Same as: Humulin R) Roll in palms of hands gently;  Do not shake vigorou
sly. "single patient use only"(Restricted to patients requiring a dose >  60 
units)  Stable for 28 days at room temperatureExpires in ______ days from _
_____________Date

Start Date: 4/16/15

Stop Date: 4/17/15

Status: Discontinued



Insulin regular

6 unit, 0.06 mL, Route: SUB-Q, Drug form: SOLN, TID-Before Meals, Dosing Weight 
88.636, kg, PRN Blood Glucose Results, Start date: 04/16/15 13:20:00, Duration: 
30 day, Stop date: 05/16/15 13:19:00

Notes: (Same as: Humulin R) Roll in palms of hands gently;  Do not shake vigorou
sly. "single patient use only"(Restricted to patients requiring a dose >  60 
units)  Stable for 28 days at room temperatureExpires in ______ days from _
_____________Date

Start Date: 4/16/15

Stop Date: 4/17/15

Status: Discontinued



iron sulfate (ferrous sulfate) 325 mg oral tablet

325 mg = 1 tab, PO, Daily

Start Date: 4/16/15

Status: Ordered



ketOROLAC 30 mg/mL injectable solution

30 mg, 1 mL, Route: IV, Drug form: INJ, Q6H, Dosing Weight 88.636, kg, Start franklin
e: 04/16/15 18:00:00, Duration: 1 day, Stop date: 04/17/15 12:00:00

Notes: (Same as:Toradol) IV bolus must be given >15 seconds. Give IM 
administration slowly and deeply into the muscle.Not for use > 4 days  *** 
MEDICATION WASTE ***Product Size:  30 mgProduct Wasted:  __0_ mg

Start Date: 4/16/15

Stop Date: 4/17/15

Status: Discontinued



labetalol

10 mg, 2 mL, Route: IVP, Drug form: INJ, Q5Min, Dosing Weight 88.636, kg, PRN El
evated BP, Start date: 04/16/15 13:56:00, Duration: 5 doses or times, Stop date:
Limited # of times

Start Date: 4/16/15

Stop Date: 4/16/15

Status: Discontinued



lisinopril 2.5 mg oral tablet

2.5 mg = 1 tab, PO, Daily, # 30 tab

Start Date: 4/16/15

Status: Ordered



meperidine

12.5 mg, 0.25 mL, Route: IVP, Drug form: INJ, Q30Min, Dosing Weight 88.636, kg, 
PRN Other -See Comment, For shivering, Start date: 04/16/15 13:56:00, Duration: 
2 doses or times, Stop date: 04/17/15 0:00:00

Notes: (Same as: Demerol)  "Use Precaution in Elderly, Seizure disorders, and Re
nal impairment"

Start Date: 4/16/15

Stop Date: 4/16/15

Status: Discontinued



multivitamin

1 tab, PO, Daily

Start Date: 4/16/15

Status: Ordered



naloxone

0.04 mg, 0.1 mL, Route: IVP, Drug form: INJ, Q2MIN, Dosing Weight 88.636, kg, AR
N Narcotic Reversal, Start date: 04/16/15 13:56:00, Duration: 8 doses or times, 
Stop date: 04/17/15 0:00:00

Notes: (Same as: Narcan)

Start Date: 4/16/15

Stop Date: 4/16/15

Status: Discontinued



Norco 10/325 oral tablet

1-2 tab, PO, Q4-6H, PRN Pain, # 30 tab, 0 Refill(s)

Start Date: 4/14/15

Stop Date: 4/19/15

Status: Ordered



normal saline 0.9% IV 1,000 mL

1,000 mL, Rate: 75 ml/hr, Infuse over: 13.3 hr, Route: IV, Dosing Weight 88.636 
kg, Total Volume: 1,000, Start date: 04/16/15 13:15:00, Duration: 30 day, Stop d
ate: 05/16/15 13:14:00

Start Date: 4/16/15

Stop Date: 4/17/15

Status: Discontinued



NovoLIN 70/30

SUB-Q, TID-Before Meals, 0 Refill(s)

Start Date: 4/14/15

Stop Date: 4/16/15

Status: Discontinued



NovoLIN 70/30

20 - 30 units, SUB-Q, TID-Before Meals

Start Date: 4/16/15

Status: Ordered



Ofirmev

1,000 mg, 100 mL, Route: IV, Drug form: INJ, Q6H, Dosing Weight 88.636, kg, for 
> or = 50 kg, Start date: 04/16/15 18:00:00, Duration: 1 day, Stop date: 
04/17/15 12:00:00

Notes: Infuse over 15 minutesDo not exceed 4gm/day of acetaminophen  *** MEDICAT
ION WASTE ***Product Size:  1000 mgProduct Wasted:  _0__ mg

Start Date: 4/16/15

Stop Date: 4/17/15

Status: Discontinued



ondansetron

4 mg, 2 mL, Route: IVP, Drug form: INJ, ONCE, Dosing Weight 88.636, kg, PRN Naus
ea & Vomiting, Start date: 04/16/15 13:56:00

Notes: (Same as: Zofran)  *** MEDICATION WASTE ***Product Size:  4 mgProduct Was
annie:  ___ mg

Start Date: 4/16/15

Stop Date: 4/16/15

Status: Discontinued



oxyCODONE 5 mg immediate release

5 mg, 1 tab, Route: PO, Drug form: TAB, Q4H, Dosing Weight 88.636, kg, PRN Pain 
Score 4-6, Start date: 04/16/15 13:16:00, Duration: 30 day, Stop date: 05/16/15 
13:15:00

Notes: (Same as: Roxicodone)

Start Date: 4/16/15

Stop Date: 4/17/15

Status: Discontinued



oxyCONTIN

30 mg, 3 tab, Route: PO, Drug form: ERTAB, Q12H, Dosing Weight 88.636, kg, Start
date: 04/16/15 21:00:00, Stop date: 05/16/15 9:00:00

Notes: (Same as: OxyContin)

Start Date: 4/16/15

Stop Date: 4/17/15

Status: Discontinued



Phenergan

12.5 mg, 0.5 mL, Route: IVPB, Drug form: INJ, Q4H, Dosing Weight 88.636, kg, PRN
Nausea & Vomiting, Start date: 04/16/15 13:15:00, Duration: 30 day, Stop date: 
05/16/15 13:14:00

Notes: Do not give IV push.  (Same as: Phenergan)

Start Date: 4/16/15

Stop Date: 4/17/15

Status: Discontinued



Vitamin B-12 500 mcg oral tablet

500 microgram = 1 tab, PO, Daily

Start Date: 4/16/15

Status: Ordered



Zofran

4 mg, 2 mL, Route: IV, Drug form: INJ, Q4H, Dosing Weight 88.636, kg, PRN Nausea
, Start date: 04/16/15 13:15:00, Duration: 30 day, Stop date: 05/16/15 13:14:00

Notes: (Same as: Zofran)  *** MEDICATION WASTE ***Product Size:  4 mgProduct Was
annie:  ___ mg

Start Date: 4/16/15

Stop Date: 4/17/15

Status: Discontinued



Results





BLOOD BANK RESULTS



 



                           Most recent to            1



                                         oldest [Reference 



                                         Range]: 

 

 



                           ABO/Rh                    O POS



                                         *Unknown*



                                         (4/16/15 12:00 PM)

 

 



                           Antibody Scrn             Negative



                                         (4/16/15 12:00 PM)







ELECTROLYTES



 



                           Most recent to            1



                                         oldest [Reference 



                                         Range]: 

 

 



                           Sodium Lvl [135-145       139 mEq/L



                           mEq/L]                    (4/14/15 10:00 AM)

 

 



                           Potassium Lvl             3.9 mEq/L



                           [3.5-5.1 mEq/L]           (4/14/15 10:00 AM)

 

 



                           Chloride Lvl [      101 mEq/L



                           mEq/L]                    (4/14/15 10:00 AM)

 

 



                           CO2 [24-32 mEq/L]         30 mEq/L



                                         (4/14/15 10:00 AM)

 

 



                           AGAP [10.0-20.0           11.9 mEq/L



                           mEq/L]                    (4/14/15 10:00 AM)







CHEM PANEL



 



                           Most recent to            1



                                         oldest [Reference 



                                         Range]: 

 

 



                           Creatinine Lvl            0.7 mg/dL



                           [0.5-1.4 mg/dL]           (4/14/15 10:00 AM)

 

 



                           eGFR                      115 mL/min/1.73m2 1



                                         *NA*



                                         (4/14/15 10:00 AM)

 

 



                           BUN [7-22 mg/dL]          15 mg/dL



                                         (4/14/15 10:00 AM)

 

 



                           Glucose Lvl [70-99        146 mg/dL 2



                           mg/dL]                    *HI*



                                         (4/14/15 10:00 AM)

 

 



                           Calcium Lvl               9.2 mg/dL



                           [8.5-10.5 mg/dL]          (4/14/15 10:00 AM)







1Result Comment: The eGFR is calculated using the CKD-EPI formula. In most 
young, healthy individuals the eGFR will be >90 mL/min/1.73m2. The eGFR declines
with age. An eGFR of 60-89 may be normal in some populations, particularly the 
elderly, for whom the CKD-EPI formula has not been extensively validated. Use of
the eGFR is not recommended in the following populations:



Individuals with unstable creatinine concentrations, including pregnant patients
and those with serious co-morbid conditions.



Patients with extremes in muscle mass or diet. 



The data above are obtained from the National Kidney Disease Education Program (
NKDEP) which additionally recommends that when the eGFR is used in patients with
extremes of body mass index for purposes of drug dosing, the eGFR should be mul
tiplied by the estimated BMI.



2Interpretive Data: Adult reference range values reflect the clinical guidelines

of the American Diabetes Association.



HEMATOLOGY



 



                           Most recent to            1



                                         oldest [Reference 



                                         Range]: 

 

 



                           WBC [3.7-10.4 K/CMM]      8.1 K/CMM



                                         (4/14/15 10:00 AM)

 

 



                           RBC [4.70-6.10            4.54 M/CMM



                           M/CMM]                    *LOW*



                                         (4/14/15 10:00 AM)

 

 



                           Hgb [14.0-18.0 g/dL]      14.1 g/dL



                                         (4/14/15 10:00 AM)

 

 



                           Hct [42.0-54.0 %]         40.7 %



                                         *LOW*



                                         (4/14/15 10:00 AM)

 

 



                           MCV [80.0-94.0 fL]        89.7 fL



                                         (4/14/15 10:00 AM)

 

 



                           MCH [27.0-31.0 pg]        31.0 pg



                                         (4/14/15 10:00 AM)

 

 



                           MCHC [32.0-36.0           34.5 g/dL



                           g/dL]                     (4/14/15 10:00 AM)

 

 



                           RDW [11.5-14.5 %]         12.8 %



                                         (4/14/15 10:00 AM)

 

 



                           Platelet [133-450         142 K/CMM



                           K/CMM]                    (4/14/15 10:00 AM)

 

 



                           MPV [7.4-10.4 fL]         9.8 fL



                                         (4/14/15 10:00 AM)

 

 



                           Segs [45.0-75.0 %]        62.7 %



                                         (4/14/15 10:00 AM)

 

 



                           Lymphocytes               27.4 %



                           [20.0-40.0 %]             (4/14/15 10:00 AM)

 

 



                           Monocytes [2.0-12.0       7.3 %



                           %]                        (4/14/15 10:00 AM)

 

 



                           Eosinophils [0.0-4.0      2.2 %



                           %]                        (4/14/15 10:00 AM)

 

 



                           Basophils [0.0-1.0        0.4 %



                           %]                        (4/14/15 10:00 AM)

 

 



                           Segs-Bands #              5.1 K/CMM



                           [1.5-8.1 K/CMM]           (4/14/15 10:00 AM)

 

 



                           Lymphocytes #             2.2 K/CMM



                           [1.0-5.5 K/CMM]           (4/14/15 10:00 AM)

 

 



                           Monocytes # [0.0-0.8      0.6 K/CMM



                           K/CMM]                    (4/14/15 10:00 AM)

 

 



                           Eosinophils #             0.2 K/CMM



                           [0.0-0.5 K/CMM]           (4/14/15 10:00 AM)

 

 



                           PT [12.0-14.7             13.4 seconds



                           seconds]                  (4/14/15 10:00 AM)

 

 



                           INR [0.85-1.17]           1.02 3



                                         (4/14/15 10:00 AM)

 

 



                           PTT [22.9-35.8            37.1 seconds 4



                           seconds]                  *HI*



                                         (4/14/15 10:00 AM)







3Interpretive Data: RECOMMENDED RANGES FOR PROTIME INR:

   2.0-3.0 for most medical and surgical thromboembolic states.

   2.5-3.5 for artificial heart valves and recurrent embolism.



INR SHOULD BE USED ONLY FOR PATIENTS ON STABLE ANTICOAGULANT THERAPY.



4Interpretive Data: Heparin Therapeutic Range:  57 - 92 Seconds



Immunizations





No data available for this section



Procedures





   



                 Procedure       Date            Related Diagnosis  Body Site

 

   



                                         Gastric bypass   

 

   



                                         Operation   

 

   



                                         Operation   

 

   



                                         Tonsillectomy and adenoidectomy   







Social History





 



                           Social History Type       Response

 

 



                           Smoking Status            Never smoker; Exposure to T

obacco Smoke None; Cigarette Smoking

Last 365



                                         Days No; Reg Smoking Cessation Counseli

ng Yes







Assessment and Plan

Extracted from:



  



                     Title: POD #1       Author: Lourdes Hawthorne

te: 4/17/15







                                        Doing well, resting comfortably in bed. 

Pain minimal, well controlled. Up, 

ambulating. No HA, N/V, dizziness. Voiding, no BM.



A&Ox3

VSS, afebrile

Wound flat, no EED, steri strips intact, cleansed/redressed

Motor 4+/5 left ankle dorsiflexion, otherwise 5/5

Pedal pulses 2+ b/l



A/P

D/W Shukri

Elevated BGM overnight 400's - due to dextrose in abx fluid, corrected with 
sliding scale

Home today

Disussed activity/wound care

He has his postop RX



Lourdes Hawthorne PA-C

## 2020-05-17 VITALS — DIASTOLIC BLOOD PRESSURE: 76 MMHG | SYSTOLIC BLOOD PRESSURE: 117 MMHG

## 2020-05-17 LAB
ALBUMIN SERPL-MCNC: 3.6 G/DL (ref 3.5–5)
ALBUMIN/GLOB SERPL: 1.2 {RATIO} (ref 0.8–2)
ALP SERPL-CCNC: 56 IU/L (ref 40–150)
ALT SERPL-CCNC: 20 IU/L (ref 0–55)
AMPHETAMINES UR QL SCN>1000 NG/ML: NEGATIVE
ANION GAP SERPL CALC-SCNC: 15.3 MMOL/L (ref 8–16)
BACTERIA URNS QL MICRO: (no result) /HPF
BENZODIAZ UR QL SCN: POSITIVE
BILIRUB UR QL: NEGATIVE
BUN SERPL-MCNC: 13 MG/DL (ref 7–26)
BUN/CREAT SERPL: 12 (ref 6–25)
CALCIUM SERPL-MCNC: 8.8 MG/DL (ref 8.4–10.2)
CHLORIDE SERPL-SCNC: 101 MMOL/L (ref 98–107)
CK MB SERPL-MCNC: 1.3 NG/ML (ref 0–4.3)
CK SERPL-CCNC: 73 IU/L (ref 30–200)
CLARITY UR: CLEAR
CO2 SERPL-SCNC: 23 MMOL/L (ref 22–29)
COLOR UR: YELLOW
DEPRECATED RBC URNS MANUAL-ACNC: (no result) /HPF (ref 0–5)
EGFRCR SERPLBLD CKD-EPI 2021: > 60 ML/MIN (ref 60–?)
EPI CELLS URNS QL MICRO: (no result) /LPF
GLOBULIN PLAS-MCNC: 2.9 G/DL (ref 2.3–3.5)
GLUCOSE SERPLBLD-MCNC: 268 MG/DL (ref 74–118)
KETONES UR QL STRIP.AUTO: NEGATIVE
LEUKOCYTE ESTERASE UR QL STRIP.AUTO: NEGATIVE
NITRITE UR QL STRIP.AUTO: NEGATIVE
PCP UR QL SCN: NEGATIVE
POTASSIUM SERPL-SCNC: 4.3 MMOL/L (ref 3.5–5.1)
PROT UR QL STRIP.AUTO: NEGATIVE
SODIUM SERPL-SCNC: 135 MMOL/L (ref 136–145)
SP GR UR STRIP: 1.02 (ref 1.01–1.02)
UROBILINOGEN UR STRIP-MCNC: 0.2 MG/DL (ref 0.2–1)
WBC #/AREA URNS HPF: (no result) /HPF (ref 0–5)

## 2020-05-17 NOTE — EMERGENCY DEPARTMENT NOTE
History of Present Illnes


History of Present Illness


Chief Complaint:  General Medicine Complaints


History of Present Illness


This is a 50 year old  male with c/o low blood pressure for the past 

week since he started taking seroquel. pt is also on norco10/325, oxcontin 40, 

gabapentin, ambien, alprazolam


he states he was doing fine until he started the seroquel and since starting 

seroquel his blood pressure has been runngint low, tonight sys in the 60"s. 

denies fever, cough, chest pain , n/v/d only symptom is he feels fatigued.


Historian:  Patient, Family Member


Arrival Mode:  Car


Onset (how long ago):  day(s) (7)


Location:  none


Quality:  fatigue


Severity:  moderate


Onset quality:  gradual


Duration (how long):  day(s) (7)


Timing of current episode:  constant


Progression:  worsening


Chronicity:  new


Relieving factors:  none


Exacerbating factors:  none





Past Medical/Family History


Physician Review


I have reviewed the patient's past medical and family history.  Any updates have

been documented here.





Past Medical History


Recent Fever:  No


Clinical Suspicion of Infectio:  No


New/Unexplained Change in Ment:  No


Past Medical History:  Diabetes, Migraines


Other Medical History:  


NEUROPATHY





"Crushed spine"


Past Surgical History:  Cholecysctectomy, Appendectomy, Knee Replacement, Back 

Surgery


Other Surgery:  


Gastric Bypass





Social History


Counseling Performed:  Yes


Alcohol Use:  Occasional


Any Illegal Drug Use:  No





Family History


Family history of heart diseas:  No





Other


Last Tetanus:  NO





Review of Systems


Review of Systems


Constitutional:  no symptoms


EENTM:  no symptoms


Cardiovascular:  no symptoms


Respiratory:  no symptoms


Gastrointestinal:  no symptoms


Genitourinary:  no symptoms


Musculoskeletal:  no symptoms


Neurological:  weakness (generalized), other (fatigue)


Psychological:  no symptoms


Endocrine:  no symptoms


Hematological/Lymphatic:  no symptoms


Review of other systems


All other systems reviewed and negative.





Physical Exam


Related Data


Allergies:  


Coded Allergies:  


     No Known Allergies (Unverified , 1/31/16)


Triage Vital Signs





Vital Signs








  Date Time  Temp Pulse Resp B/P (MAP) Pulse Ox O2 Delivery O2 Flow Rate FiO2


 


5/16/20 23:42 97.7 81 14 64/41 99   








Vital signs reviewed:  Yes





Physical Exam


CONSTITUTIONAL





Constitutional:  well-developed, well-nourished


HENT


HENT:  normocephalic, atraumatic, oropharynx clear/moist, nose normal


HENT L/R:  left ext ear normal, right ext ear normal


EYES





Eyes:  PERRL, conjunctivae normal


NECK


Neck:  ROM normal


PULMONARY


Pulmonary:  effort normal, breath sounds normal


CARDIOVASCULAR





Cardiovascular:  regular rhythm, heart sounds normal, capillary refill normal, 

normal rate


GASTROINTESTINAL





Abdominal:  soft, nontender, bowel sounds normal


GENITOURINARY





Genitourinary:  exam deferred


SKIN


Skin:  warm, dry


MUSCULOSKELETAL





Musculoskeletal:  ROM normal


NEUROLOGICAL





Neurological:  alert, oriented x 3, no gross motor or sensory deficits


PSYCHOLOGICAL


Psychological:  mood/affect normal, judgement normal





Results


Laboratory


Laboratory





Laboratory Tests








Test


 5/16/20


23:45


 


White Blood Count


 5.20 x10e3/uL


(4.8-10.8)


 


Red Blood Count


 4.11 x10e6/uL


(4.3-5.7)


 


Hemoglobin


 11.1 g/dL


(14.0-18.0)


 


Hematocrit


 34.5 %


(38.2-49.6)


 


Mean Corpuscular Volume


 83.9 fL


(81-99)


 


Mean Corpuscular Hemoglobin


 27.0 pg


(28-32)


 


Mean Corpuscular Hemoglobin


Concent 32.2 g/dL


(31-35)


 


Red Cell Distribution Width


 14.2 %


(11.7-14.4)


 


Platelet Count


 192 x10e3/uL


(140-360)


 


Neutrophils (%) (Auto)


 44.6 %


(38.7-80.0)


 


Lymphocytes (%) (Auto)


 45.2 %


(18.0-39.1)


 


Monocytes (%) (Auto)


 6.7  %


(4.4-11.3)


 


Eosinophils (%) (Auto)


 2.7 %


(0.0-6.0)


 


Basophils (%) (Auto)


 0.6 %


(0.0-1.0)


 


Neutrophils # (Auto) 2.3 (2.1-6.9) 


 


Lymphocytes # (Auto) 2.4 (1.0-3.2) 


 


Monocytes # (Auto) 0.4 (0.2-0.8) 


 


Eosinophils # (Auto) 0.1 (0.0-0.4) 


 


Basophils # (Auto) 0.0 (0.0-0.1) 


 


Absolute Immature Granulocyte


(auto 0.01 x10e3/uL


(0-0.1)


 


Sodium Level


 135 mmol/L


(136-145)


 


Potassium Level


 4.3 mmol/L


(3.5-5.1)


 


Chloride Level


 101 mmol/L


()


 


Carbon Dioxide Level


 23 mmol/L


(22-29)


 


Anion Gap


 15.3 mmol/L


(8-16)


 


Blood Urea Nitrogen


 13 mg/dL


(7-26)


 


Creatinine


 1.06 mg/dL


(0.72-1.25)


 


Estimat Glomerular Filtration


Rate > 60 ML/MIN


(60-)


 


BUN/Creatinine Ratio 12 (6-25) 


 


Glucose Level


 268 mg/dL


()


 


Calcium Level


 8.8 mg/dL


(8.4-10.2)


 


Total Bilirubin


 0.1 mg/dL


(0.2-1.2)


 


Aspartate Amino Transf


(AST/SGOT) 19 IU/L (5-34) 





 


Alanine Aminotransferase


(ALT/SGPT) 20 IU/L (0-55) 





 


Alkaline Phosphatase


 56 IU/L


()


 


Creatine Kinase


 73 IU/L


()


 


Creatine Kinase MB


 1.30 ng/mL


(0-4.3)


 


Troponin I


 < 0.05 ng/mL


(0.0-0.40)


 


Total Protein


 6.5 g/dL


(6.5-8.1)


 


Albumin


 3.6 g/dL


(3.5-5.0)


 


Globulin


 2.9 g/dL


(2.3-3.5)


 


Albumin/Globulin Ratio 1.2 (0.8-2.0) 








Laboratory Tests








Test


 5/16/20


23:45








Lab results reviewed:  Yes





Imaging


Imaging results reviewed:  Yes





Procedures


12 Lead ECG Interpretation


:  Interpreted by ED physician


Rhythm:  sinus rhythm


Rate:  normal (72)


QRS axis:  normal


ST segments normal:  Yes


T waves normal:  Yes


Clinical Impression:  normal ECG





Critical Care Time


Subsequent provider


I assumed direction of critical care for this patient from another provider of 

my specialty.





Assessment & Plan


Assessment & Plan


Problems:  


(1) Polypharmacy


Assessment & Plan


pt with hypotension since starting seroquel 1 week ago which has a known side 

effect of hypotension,  however pt is on multiple medications that can lower 

blood pressure, that being said his low blood pressure may be a result of poly 

pharmacy(over medication). pt is alert and in no distress.


narcan 1 mg iv ordered to counter act the opiates he is on at this time


ns 2 liters iv bolus ordered





Reassessment


Reassessment time:  01:15


Reassessment


pt doing better, bp now 107/74


Depart Disposition:  HOME, SELF-CARE


Last Vital Signs











  Date Time  Temp Pulse Resp B/P (MAP) Pulse Ox O2 Delivery O2 Flow Rate FiO2


 


5/16/20 23:42 97.7 81 14 64/41 99   








Home Meds


Reported Medications


Lisinopril (LISINOPRIL) 10 Mg Tablet, 10 MG PO DAILY, #30 TAB


   4/25/18


Tizanidine Hcl (TIZANIDINE HCL) 4 Mg Tablet, 4 MG PO PRN, TAB


   4/25/18


Insulin Human Isophan/Regular (NOVOLIN 70-30 100 UNIT/ML VIAL) 100 Units/Ml  Ml,

UNITS SC ACHS


   1/31/16


Hydrocodone Bit/Acetaminophen (NORCO 5-325 TABLET) 1 Each Tablet, 1 MG PO TID, 

TAB


   11/25/14


Oxycodone Hcl (ROXICODONE) 30 Mg Tablet, 40 MG PO BID


   11/11/12


Medications in the ED





Sodium Chloride 1,000 ml @  100 mls/hr Q10H IV ;  Start 5/16/20 at 23:45;  Stop 

6/15/20 at 23:44


Naloxone HCl 1 mg ONCE  ONCE IV ;  Start 5/16/20 at 23:45;  Stop 5/16/20 at 

23:46


Naloxone HCl 0.8 mg STK-MED ONCE .ROUTE ;  Start 5/16/20 at 23:49;  Stop 5/16/20

at 23:44;  Status DC


Sodium Chloride 1,000 ml @  ud STK-MED ONCE .ROUTE ;  Start 5/16/20 at 23:49;  

Stop 5/16/20 at 23:44;  Status DC


Sodium Chloride 1,000 ml @  999 mls/hr Q1H1M ONCE IV ;  Start 5/17/20 at 00:00; 

Stop 5/17/20 at 01:00











CORY RENEE MD        May 17, 2020 00:09

## 2020-05-17 NOTE — DIAGNOSTIC IMAGING REPORT
EXAMINATION:  CHEST SINGLE (PORTABLE)    



INDICATION:      

Weakness



COMPARISON:  None

     

FINDINGS:  AP view   



TUBES and LINES:  None.



LUNGS:  Lungs are well inflated.  Lungs are clear. There is no evidence of

pneumonia or pulmonary edema.



PLEURA:  No pleural effusion or pneumothorax.



HEART AND MEDIASTINUM:  The cardiomediastinal silhouette is unremarkable.    



BONES AND SOFT TISSUES:  No acute osseous lesion.  Soft tissues are

unremarkable.



UPPER ABDOMEN: No free air under the diaphragm.    



IMPRESSION: 

No acute thoracic radiographic abnormality.





Signed by: Tab Ralph MD on 5/17/2020 1:07 AM

## 2020-05-28 ENCOUNTER — HOSPITAL ENCOUNTER (EMERGENCY)
Dept: HOSPITAL 88 - ER | Age: 50
Discharge: HOME | End: 2020-05-28
Payer: COMMERCIAL

## 2020-05-28 VITALS — WEIGHT: 205 LBS | HEIGHT: 71 IN | BODY MASS INDEX: 28.7 KG/M2

## 2020-05-28 DIAGNOSIS — Z98.84: ICD-10-CM
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
## 2020-05-28 DIAGNOSIS — R07.89: Primary | ICD-10-CM

## 2020-05-28 DIAGNOSIS — Z79.4: ICD-10-CM

## 2020-05-28 DIAGNOSIS — I95.2: ICD-10-CM

## 2020-05-28 DIAGNOSIS — E11.40: ICD-10-CM

## 2020-05-28 LAB
ALBUMIN SERPL-MCNC: 3.8 G/DL (ref 3.5–5)
ALBUMIN/GLOB SERPL: 1.3 {RATIO} (ref 0.8–2)
ALP SERPL-CCNC: 52 IU/L (ref 40–150)
ALT SERPL-CCNC: 18 IU/L (ref 0–55)
ANION GAP SERPL CALC-SCNC: 12.9 MMOL/L (ref 8–16)
BACTERIA URNS QL MICRO: (no result) /HPF
BASOPHILS # BLD AUTO: 0 10*3/UL (ref 0–0.1)
BASOPHILS NFR BLD AUTO: 0.7 % (ref 0–1)
BILIRUB UR QL: NEGATIVE
BUN SERPL-MCNC: 11 MG/DL (ref 7–26)
BUN/CREAT SERPL: 13 (ref 6–25)
CALCIUM SERPL-MCNC: 9.8 MG/DL (ref 8.4–10.2)
CHLORIDE SERPL-SCNC: 106 MMOL/L (ref 98–107)
CK MB SERPL-MCNC: 2 NG/ML (ref 0–5)
CK SERPL-CCNC: 128 IU/L (ref 30–200)
CLARITY UR: CLEAR
CO2 SERPL-SCNC: 23 MMOL/L (ref 22–29)
COLOR UR: YELLOW
DEPRECATED NEUTROPHILS # BLD AUTO: 1.7 10*3/UL (ref 2.1–6.9)
DEPRECATED RBC URNS MANUAL-ACNC: (no result) /HPF (ref 0–5)
EGFRCR SERPLBLD CKD-EPI 2021: > 60 ML/MIN (ref 60–?)
EOSINOPHIL # BLD AUTO: 0.2 10*3/UL (ref 0–0.4)
EOSINOPHIL NFR BLD AUTO: 3.9 % (ref 0–6)
EPI CELLS URNS QL MICRO: (no result) /LPF
ERYTHROCYTE [DISTWIDTH] IN CORD BLOOD: 14.3 % (ref 11.7–14.4)
GLOBULIN PLAS-MCNC: 2.9 G/DL (ref 2.3–3.5)
GLUCOSE SERPLBLD-MCNC: 85 MG/DL (ref 74–118)
HCT VFR BLD AUTO: 34.1 % (ref 38.2–49.6)
HGB BLD-MCNC: 11.2 G/DL (ref 14–18)
KETONES UR QL STRIP.AUTO: NEGATIVE
LEUKOCYTE ESTERASE UR QL STRIP.AUTO: NEGATIVE
LYMPHOCYTES # BLD: 2.2 10*3/UL (ref 1–3.2)
LYMPHOCYTES NFR BLD AUTO: 50.3 % (ref 18–39.1)
MCH RBC QN AUTO: 27.6 PG (ref 28–32)
MCHC RBC AUTO-ENTMCNC: 32.8 G/DL (ref 31–35)
MCV RBC AUTO: 84 FL (ref 81–99)
MONOCYTES # BLD AUTO: 0.3 10*3/UL (ref 0.2–0.8)
MONOCYTES NFR BLD AUTO: 6.9 % (ref 4.4–11.3)
NEUTS SEG NFR BLD AUTO: 38.2 % (ref 38.7–80)
NITRITE UR QL STRIP.AUTO: NEGATIVE
PLATELET # BLD AUTO: 219 X10E3/UL (ref 140–360)
POTASSIUM SERPL-SCNC: 3.9 MMOL/L (ref 3.5–5.1)
PROT UR QL STRIP.AUTO: NEGATIVE
RBC # BLD AUTO: 4.06 X10E6/UL (ref 4.3–5.7)
SODIUM SERPL-SCNC: 138 MMOL/L (ref 136–145)
SP GR UR STRIP: 1.02 (ref 1.01–1.02)
UROBILINOGEN UR STRIP-MCNC: 0.2 MG/DL (ref 0.2–1)
WBC #/AREA URNS HPF: (no result) /HPF (ref 0–5)

## 2020-05-28 PROCEDURE — 36415 COLL VENOUS BLD VENIPUNCTURE: CPT

## 2020-05-28 PROCEDURE — 71045 X-RAY EXAM CHEST 1 VIEW: CPT

## 2020-05-28 PROCEDURE — 99284 EMERGENCY DEPT VISIT MOD MDM: CPT

## 2020-05-28 PROCEDURE — 85025 COMPLETE CBC W/AUTO DIFF WBC: CPT

## 2020-05-28 PROCEDURE — 82550 ASSAY OF CK (CPK): CPT

## 2020-05-28 PROCEDURE — 80053 COMPREHEN METABOLIC PANEL: CPT

## 2020-05-28 PROCEDURE — 84484 ASSAY OF TROPONIN QUANT: CPT

## 2020-05-28 PROCEDURE — 81001 URINALYSIS AUTO W/SCOPE: CPT

## 2020-05-28 PROCEDURE — 82948 REAGENT STRIP/BLOOD GLUCOSE: CPT

## 2020-05-28 PROCEDURE — 93005 ELECTROCARDIOGRAM TRACING: CPT

## 2020-05-28 PROCEDURE — 82553 CREATINE MB FRACTION: CPT

## 2020-05-28 NOTE — DIAGNOSTIC IMAGING REPORT
EXAMINATION:  CHEST SINGLE (PORTABLE)    



INDICATION: Chest wall pain     



COMPARISON:  Chest x-ray 5/17/2020

     

FINDINGS:    



TUBES and LINES:  None.



LUNGS:  Normal lung volumes.  Lungs are clear.  No consolidations.



PLEURA:  No pleural effusion or pneumothorax.



HEART AND MEDIASTINUM:  The cardiomediastinal silhouette is unremarkable.    



BONES AND SOFT TISSUES:  No acute osseous lesion.  Soft tissues are

unremarkable.



UPPER ABDOMEN: No free air under the diaphragm.    



IMPRESSION: 



No acute thoracic radiographic abnormality.





Signed by: Scot Stuart DO on 5/28/2020 4:33 AM

## 2020-05-28 NOTE — XMS REPORT
Patient Summary Document

                             Created on: 2020



SHANNA CRUMP JR

External Reference #: [id number]

: 1970

Sex: Male



Demographics





                          Address                   [address] 

[place]  [address]

 

                          Home Phone                [phone]

 

                          Preferred Language        English

 

                          Marital Status            Unknown

 

                          Catholic Affiliation     Unknown

 

                          Race                      Unknown

 

                                        Additional Race(s) 

White

White/





 

                          Ethnic Group              Non-





Author





                          Author                    [facility]

t

 

                          Organization              [facility]

 

                          Address                   [address]

[place]  [address]



 

                          Phone                     Unavailable







Support





                Name            Relationship    Address         Phone

 

                    CRUMPSHANNA      PRS                 UNKNOWN

[place], [place]  [phone]

 

                    AFSANEH CRUMP       ECON                [address]

[id number]

[place], [place]  [id number] [phone]







Care Team Providers





                    Care Team Member Name Role                Phone

 

                    DO JAYSON CAAL PCP                 [phone]

 

                    WILLY RENEE Attphys             Unavailable

 

                    Nehemias MULTANI,  Radames Attphys             [phone]

 

                    Hampel,  Nehemia    Attphys             [phone]

 

                    HAMPEL,  NEHEMIA    Attphys             Unavailable

 

                    HAMPEL,  PEDRO        Attphys             Unavailable

 

                    Keagan Watt Attphys             [phone]

 

                    Keagan Watt Admphys             [phone]







Payers





           Payer Name Policy Type Policy Number Effective Date Expiration Date S

amarilys

 

           [employer] [facility] Ppo            NA         2018 00:00:00       

     [facility]

 

           Medicare A & B            NA         2014 00:00:00            [facility]ARC[facility]

 

                    MEDICAREMEDICARE PART A AND [id number]

[place]                     

xxxxxxxxxxx         2014 00:00:00                     Abdi Palmer

 

                    BCBSBCBS CHOICE PPO/FEDERAL EMPL PPOxxxxxxxxxxxx1/2018-Pre

sentPPO                     

xxxxxxxxxxxx        2018 00:00:00                     Abdi Palmer







Problems





           Condition Name Condition Details Condition Category Status     Onset 

Date Resolution

Date            Last Treatment Date Treating Clinician Comments        Source

 

                          STENOSIS, SPONYLOSIS, DISC DEGENERATION               

          STENOSIS, 

SPONYLOSIS, DISC DEGENERATION                        Active                     
  2017                                                                    
                           [facility]                     Diagnosis

          Active    2017 00:00:00           2017-08-15 08:44:00           

          [facility]

 

                          LUM STENOSIS, LUM SPONDYLOSIS, LUM DISC               

          LUM STENOSIS, 

LUM SPONDYLOSIS, LUM DISC                        Active                        
2015                                                                      
                         [facility]                     Diagnosis  

               

Active     2015 00:00:00            2015 13:00:00                   

    [facility]

 

       Polypharmacy        Problem Active                                    [facility]

 

                          Anxiety (finding)                                 Anxi

ety (finding)                     

  Active                                                Problem                 
      06/15/2019                                                [facility] [place]                     Problem      Active                 

                2019-06-15 

23:12:47                                                    [facility]  [facility] [place]

 

                          Backache (finding)                                Back

ache (finding)                   

    Active                                                Problem               
        06/15/2019                                                [facility] [place]                     Problem      Active         

                        

2019-06-15 23:12:47                                         [facility]  [facility] [place]

 

                          Diabetes mellitus (disorder)                         D

iabetes mellitus 

(disorder)                        Active                                        
       Problem                        06/15/2019                                
               [facility] [place]                     

Problem   Active                        2019-06-15 23:12:47                     

[facility] 

[place]

 

                          Depressive disorder (disorder)                        

 Depressive disorder 

(disorder)                        Active                                        
       Problem                        06/15/2019                                
               [facility] [place]                     

Problem   Active                        2019-06-15 23:12:47                     

[facility] 

[place]

 

                          Obstructive sleep apnea syndrome (disorder)           

              Obstructive 

sleep apnea syndrome (disorder)                        Active                   
                            Problem                        06/15/2019           
                                    [facility] [place]    
                Problem Active                  2019-06-15 23:12:47             

    [facility] [place]

 

                          Pain (finding)                                    Pain

 (finding)                        

Active                                                Problem                   
    06/15/2019                                                [facility] [place]                     Problem      Active                 

                2019-06-15 

23:12:47                                                    [facility] [place]

 

                          Spinal stenosis in cervical region (disorder)         

                Spinal 

stenosis in cervical region (disorder)                        Active            
                                   Problem                        06/15/2019    
                                           [facility] 
[place]                     Problem Active                  2019-06-15 23:12:47

                 [facility] [place]

 

                          Final:                                            Yovana

l:                                           

                                                    2015                  
                             [facility]                     Problem

                                        2015 01:40:00                     

[facility]

 

                          SPIN STEN,LUMBR W YAAKOV                           SPIN

 STEN,LUMBR W YAAKOV         

              Active                                                            
                                                           [facility]                     Diagnosis Active                  2015 13:00:00

                 [facility]

 

                          LUMBOSACRAL SPONDYLOSIS                           LUMB

OSACRAL SPONDYLOSIS         

              Active                                                            
                                                           [facility]                     Diagnosis Active                  2015 13:00:00

                 [facility]







Allergies, Adverse Reactions, Alerts





        Allergy Name Allergy Type Status  Severity Reaction(s) Onset Date Inacti

ve Date 

Treating Clinician        Comments                  Source

 

       No Known Allergies DA     Active U             2017 00:00:00       

               [facility]

 

       No Known Medication Allergies No Known Medication Allergies Active       

                                    

[facility]







Family History





           Family Member Diagnosis  Comments   Start Date Stop Date  Source

 

           Natural father Hypertension                                  [place] 

[place]

 

           Natural father Kidney disease                                  [place]

[place]

 

           Natural father Scoliosis                                   [place]

thodist

 

           Natural mother Hypertension                                  [place] 

[facility]

 

           Natural mother Lung cancer                                  [place] M

ethodist

 

           Natural mother Thyroid disease                                  [place]dy kong [place]







Social History





           Social Habit Start Date Stop Date  Quantity   Comments   Source

 

           History SDOH Alcohol Std Drinks                                      

       [place]

 

           History SDOH Alcohol Binge                                           

  [place]

 

           Sex Assigned At Birth                                             Rehan

[place]

 

                Alcohol intake  2020 00:00:00 2020 00:00:00 Current 

non-drinker of 

alcohol (finding)                                   [place]

 

           History SDOH Alcohol Frequency 2019 00:00:00 2019 00:00:0

0 1                     

Abdi Palmer







                Smoking Status  Start Date      Stop Date       Source

 

                Social History                                  [facility]







Medications





             Ordered Medication Name Filled Medication Name Start Date   Stop Da

te    Current 

Medication? Ordering Clinician Indication Dosage     Frequency  Signature (SIG) 

Comments                  Components                Source

 

                    acetaminophen-codeine (TYLENOL WITH CODEINE #3) 300-30 mg pe

r tablet                     

2020 00:00:00 2020 23:59:00 No                  acute pain 1{tbl}   

 Q6H       Take 1-2 

tablets by mouth every 6 (six) hours as needed for moderate pain for up to 15 
days .acute pain.                                           Abdi Palmer

 

        cyanocobalamin (VITAMIN B-12) 1000 MCG tablet         2019 10:58:4

7         Yes                     

1000ug       QD           Take 1,000 mcg by mouth daily.                        

   Abdi Palmer

 

           multivit-minerals/ferrous fum (MULTI VITAMIN ORAL)            2019 10:58:47            Yes         

                                        Take by mouth.                     Roddy Palmer

 

       potassium gluconate 595 mg (99 mg) tablet        2019 10:58:47     

   Yes                                

Take by mouth.                                              Abdi Palmer

 

             calcium carb/vit D3/minerals (CALCIUM-VITAMIN D ORAL)              

2019 10:58:47              

Yes                                     Take by mouth.                 Abdi berg

 

                    cholecalciferol, vitamin D3, (VITAMIN D3) 2,000 unit capsule

 capsule                     

2019 10:58:47        Yes                  2000U  QD     Take 2,000 Units b

y mouth daily.               

Abdi Palmer

 

      ginkgo biloba (GINKGO ORAL)       2019 10:58:47       Yes           

                Take by mouth.  

                                                    Abdi Palmer

 

                    oxyCODone (OxyCONTIN) 40 mg tablet,oral only,ext.rel.12 hr E

R tablet                     

2019 00:00:00         Yes                                     TAKE ONE (1)

 TABLET(S) BY MOUTH TWICE A DAY.  

                                                    Abdi Palmer

 

       zolpidem (AMBIEN) 10 mg tablet        2019-01-10 00:00:00        Yes     

                           TK 1 T PO  

QHS FOR INSOMNIA                                            Abdi Palmer

 

             HYDROcodone-acetaminophen (NORCO)  mg per tablet             

 2019 00:00:00              

Yes                                                             Abdi manuel

 

     gabapentin (NEURONTIN) 600 mg tablet      2019 00:00:00      Yes     

                                

Abdi Palmer

 

                NOVOLIN 70/30 U-100 INSULIN 100 unit/mL (70-30) injection       

          2018 00:00:00 

        Yes                                                             Abdi Palmer

 

     TiZANidine (ZANAFLEX) 4 MG capsule      2018-12-10 00:00:00      Yes       

                              

Abdi Palmer

 

        hydroCHLOROthiazide (HYDRODIURIL) 25 MG tablet         2018 00:00:

00         Yes                      

                                                                 Abdi Laughlin

st

 

       lisinopril (PRINIVIL,ZESTRIL) 20 mg tablet        2018 00:00:00    

    Yes                                 

                                                            Abdi Palmer

 

       escitalopram (LEXAPRO) 20 MG tablet        2018 00:00:00        Yes

                                TK 2 TS 

PO QAM                                                      Abdi Palmer

 

       Lisinopril        2017 14:00:00        No                          

       Notes: (Same as: Prinivil, 

Zestril)                                                    [facility]

ter

 

       lisinopril 20 mg oral tablet        2017 19:50:00        Yes       

                         20 mg = 1 tab, 

PO, Daily, 0 Refill(s)                                         [facility]

 

                                        pneumococcal capsular polysaccharide typ

e 1 vaccine / pneumococcal capsular 

polysaccharide type 10A vaccine / pneumococcal capsular polysaccharide type 11A 
vaccine / pneumococcal capsular polysaccharide type 12F vaccine / pneumococcal 
capsular polysacchar         2017 14:00:00         No                     

                 Notes: (Same as: 

Pneumovax 23)  Refrigerate                                         [facility]

 

       Magnesium Oxide        2017 00:14:00        No                     

            Notes: (Same as: Mag-Ox 400) 

Magnesium oxide [id number] elemental magnesium Dose=____mg magnesium oxide 
(___mg elemental magnesium)                                         [facility]

 

                                        NPH Insulin, Human 70 UNT/ML / Regular I

nsulin, Human 30 UNT/ML Injectable 

Suspension         2017 23:00:00         No                               

       Notes: Roll in palms of hands 

gently; Do not shake. (Same as: NovoLIN Mix 70/30) Do not hold insulin without 
contacting prescriber  WASTE: F/P - Black; E - Municipal Trash Bin              

                           [facility]

 

       Cefazolin        2017 22:00:00        No                           

      Notes: (Same As: Jackie Pillai)    

*** MEDICATION WASTE *** Product Size:  1000 mg Product Wasted:  ___ mg         

                                [facility]

 

       Insulin regular        2017 21:02:00        No                     

              60 units)  WASTE: F/P - 

Black; E - Municipal Trash Bin  Stable for 28 days at room temperature Expires 
in ______ days from ______________Date                                         M

[facility]

 

       Glucagon        2017 21:02:00        No                            

     1 mg, Route: IM, Drug form: PDR/INJ,

PRN, Dosing Weight 97.727, kg, PRN Blood Glucose Results, Start date: 17 
16:02:00 CDT, Duration: 30 day, Stop date: 17 16:01:00 CDT                

                         [facility]

 

       Dextrose 50% Syringe        2017 21:02:00        No                

                 25 gm, 50 mL, Route: 

IVP, Drug Form: INJ, Dosing Weight 97.727, kg, PRN, PRN Blood Glucose Results, 
Start date: 17 16:02:00 CDT, Duration: 30 day, Stop date: 17 
16:01:00 CDT                                                [facility]

ter

 

     gabapentin      2017 19:00:00      No                       Notes: (S

josé luis as: Neurontin)           [facility]

 

       Acetaminophen        2017 19:00:00        No                       

          Notes: Max acetaminophen 4000 

mg/day (4 gm/day).  (Same as: Tylenol Extra Strength)                           

              [facility]

 

       Oxycontin        2017 19:00:00        No                           

      Notes: Do not crush or chew. (Same 

as: OxyContin)                                              [facility]

 

       Oxycodone Hydrochloride 5 MG Oral Tablet        2017 18:30:00      

  No                                 

Notes: (Same as: Roxicodone)                                         [facility]

 

       ketOROLAC 30 mg/mL injectable solution        2017 17:00:00        

No                                  4 

days.                                                       [facility]

 

                    24 HR tramadol hydrochloride 100 MG Extended Release Tablet 

                    2017 

16:05:00        No                                 Notes: Not to exceed 400mg/da

y. (Same As: Ultram)               [facility]

 

       tramadol hydrochloride 50 MG Oral Tablet        2017 16:01:00      

  No                                 50 

mg, Route: PO, Drug form: TAB, Q6H, Dosing Weight 97.727, kg, PRN Pain Score 1-
3, Start date: 17 11:01:00 CDT, Duration: 30 day, Stop date: 17 
11:00:00 CDT                                                [facility]

 

     Hydromorphone      2017 16:00:00      No                       Notes:

 Same as: Dilaudid           [facility]

 

     Oxycodone      2017 16:00:00      No                       Notes: (

me as: Roxicodone)           [facility]

 

       Ondansetron        2017 16:00:00        No                         

        Notes: (Same as: Zofran)   *** 

MEDICATION WASTE *** Product Size:  4 mg Product Wasted:  ___ mg                

                         [facility]

 

       neostigmine (ANES)        2017 15:53:00        No                  

               Route: IV, Drug form: INJ,

ONCE, Stop date: 17 10:53:00 CDT                                         [facility]

 

       glycopyrrolate (ANES)        2017 15:53:00        No               

                  Route: IV, Drug form: 

INJ, ONCE, Stop date: 17 10:53:00 CDT                                     

    [facility]

 

       [facility]        2017 15:53:00        No                  

               Route: IV, Drug form: INJ,

ONCE, Stop date: 17 10:53:00 CDT                                         

[facility]

 

     ket[facility]OLAC [facility]      2017 15:53:00      No                       IV,

 ONCE           [facility]

 

       LR 1000 mL INJ [facility]        2017 15:08:00        No               

                  Route: IV, Total 

Volume: 1,000, Start date: 17 10:08:00 CDT, Stop date: 17 11:08:00 
CDT                                                         [facility]

ter

 

       phenylephrine [facility]        2017 15:07:00        No                

                 Route: IV, Drug form: 

INJ, ONCE, Stop date: 17 10:07:00 CDT                                     

    [facility]

 

       dexamethasone [facility]        2017 14:57:00        No                

                 Route: IV, Drug form: 

INJ, ONCE, Stop date: 17 9:57:00 CDT                                      

   [facility]

 

       rocuronium ([facility])        2017 14:42:00        No                   

              Route: IV, Drug form: INJ, 

ONCE, Stop date: 17 9:42:00 CDT                                         [facility]

 

       lidocaine ([facility])        2017 14:42:00        No                    

             Route: IV, Drug form: INJ, 

ONCE, Stop date: 17 9:42:00 CDT                                         [facility]

 

       propofol ([facility])        2017 14:42:00        No                     

            Route: IV, Drug form: INJ, 

ONCE, Stop date: 17 9:42:00 CDT                                         [facility]

 

       fentaNYL ([facility]        2017 14:42:00        No                     

            Route: IV, Drug form: INJ, 

ONCE, Stop date: 17 9:42:00 CDT                                         [facility]

 

       ceFAZolin ([facility])        2017 14:42:00        No                    

             Route: IV, Drug form: INJ, 

ONCE, Stop date: 17 9:42:00 CDT                                         [facility]

 

       midazolam ([facility])        2017 14:37:00        No                    

             Route: IV, Drug form: SOLN, 

ONCE, Stop date: 17 9:37:00 CDT                                         [facility]

 

       Protonix        2017 14:00:00        No                            

     Notes: For IV push reconstitute with

10 ml 0.9% sodium chloride and push over 2 minutes. (Same as: Protonix)         

                                [facility]

 

       SUFentanil (ANES) (ANES)        2017 14:00:00        No            

                     Route: IV, Drug 

form: INJ, Start date: 17 9:00:00 CDT, Stop date: 17 10:00:00 CDT   

                        

                                        [facility]

 

                    sodium chloride 0.9% 100 ml INJ (ANES) + ketAMINE (ANES) (AN

ES)                     2017 

14:00:00            No                                                Route: IV,

 Drug form: INJ, Start date: 17 9:00:00 

CDT, Stop date: 17 10:00:00 CDT                                         [facility] [place]

 

                    sodium chloride 0.9% 100 ml INJ (ANES) + magnesium sulfate (

ANES) (ANES)                     

2017 14:00:00           No                                                

Route: IV, Drug form: INJ, Start date: 17

9:00:00 CDT, Stop date: 17 10:00:00 CDT                                   

      [facility] [place] [facility]

 

                    sodium chloride 0.9% 100 ml INJ (ANES) + lidocaine (ANES) (A

BEBO)                     2017 

14:00:00            No                                                Route: IV,

 Drug form: INJ, Start date: 17 9:00:00 

CDT, Stop date: 17 10:00:00 CDT                                         [facility] [place] [facility]

 

       Oxycontin        2017 14:00:00        No                           

      40 mg, 2 tab, Route: PO, Drug form:

ERTAB, Q12H, Dosing Weight 97.727, kg, Start date: 17 9:00:00 CDT, 
Duration: 30 day, Stop date: 17 21:00:00 CDT                              

           [facility]

 

      Dexamethasone       2017 14:00:00       No                          

  Notes: Concentration: 4mg/ml       

                                        [facility]

 

       Docusate Sodium 100 MG Oral Capsule [Colace]        2017 14:00:00  

      No                                 

Notes: (Same as: Colace) (Do Not Crush)                                         

[facility]

 

       Metformin hydrochloride 500 MG Oral Tablet        2017 14:00:00    

    No                                 

Notes: (Same as: Glucophage)  Take with meal                                    

     [facility] [place] [facility]

 

       ReliOn/NovoLIN 70/30        2017 14:00:00        No                

                 30 unit, Route: SUB-Q, 

Daily, Dosing Weight 97.727, kg, Start date: 17 9:00:00 CDT, Duration: 30 
day, Stop date: 17 9:00:00 CDT                                         [facility]

 

       gabapentin 600 MG Oral Tablet        2017 14:00:00        No       

                          Notes: (Same 

as: Neurontin)                                              [facility]

ter

 

     Escitalopram      2017 14:00:00      No                       Notes: 

(Same as: Lexapro)           [facility]

 

       Zofran        2017 13:42:00        No                              

   Notes: (Same as: Zofran)   *** 

MEDICATION WASTE *** Product Size:  4 mg Product Wasted:  ___ mg                

                         [facility]

 

       tramadol hydrochloride 50 MG Oral Tablet        2017 13:42:00      

  No                                 

Notes: Not to exceed 400mg/day. (Same As: Ultram)                               

          [facility]

 

     tizanidine      2017 13:42:00      No                       Notes: (S

josé luis As: Zanaflex)           [facility]

 

      sennosides, USP       2017 13:42:00       No                        

    Notes: (Same as: Senokot)        

                                        [facility]

 

       Phenergan        2017 13:42:00        No                           

      Notes: Do not give IV push.  (Same 

as: Phenergan)                                              [facility]

ter

 

       sodium chloride 0.9% 1000 ml INJ 1,000 mL        2017 13:42:00     

   No                                 

1,000 mL, Rate: 75 ml/hr, Infuse over: 13.3 hr, Route: IV, Dosing Weight 97.727 
kg, Total Volume: 1,000, Start date: 17 8:42:00 CDT, Duration: 30 day, 
Stop date: 17 8:41:00 CDT                                         [facility]

 

      Morphine       2017 13:42:00       No                            Not

es: (Same as:MORPhine Sulfate)       

                                        [facility]

 

      Diphenhydramine       2017 13:41:00       No                        

    Notes: (Same as: Benadryl)       

                                        [facility]

 

      cyclobenzaprine       2017 13:41:00       No                        

    Notes: (Same As: Flexeril)       

                                        [facility]

 

                                        Benzocaine 15 MG / Menthol 3.6 MG Lozeng

e [Cepacol Sore Throat Pain Relief 

15/3.6]         2017 13:41:00         No                                  

    Notes: Cepacol lozenges  Dispense 1 

box = 16 lozenges  (Same As: Cepacol Lozenges)                                  

       [facility]

 

     Ambien      2017 13:41:00      No                       Notes: (Same 

As: Ambien)           [facility]

 

     Lorazepam      2017 13:39:00      No                       Notes: (Sa

me as: Ativan)           [facility]

 

       ReliOn/NovoLIN R        2017 13:39:00        No                    

             10 unit, Route: SUB-Q, Drug 

form: SOLN, PRN, Dosing Weight 97.727, kg, PRN Blood Glucose Results, Start 
date: 17 8:39:00 CDT, Duration: 30 day, Stop date: 17 8:38:00 CDT   

                        

                                        [facility]

 

       Insulin regular        2017 13:01:00        No                     

            6 unit, Route: SUB-Q, ONCE, 

Dosing Weight 97.727, kg, Start date: 17 8:01:00 CDT, Stop date: 17 
8:01:00 CDT                                                 [facility]

 

                          Acetaminophen 325 MG / Hydrocodone Bitartrate 10 MG Or

al Tablet [Norco 10/325]  

        2017 12:03:00         No                                      Note

s: Do not exceed 4gm/day of acetaminophen. 

(Same as: Norco 325/10)                                         [facility]

 

       gabapentin 300 MG Oral Capsule        2017 12:03:00        No      

                           Notes: (Same 

as: Neurontin)                                              [facility]

 

       tizanidine 4 mg oral capsule        2017 18:49:00        Yes       

                         4 mg = 1 cap, 

PO, TID, As needed                                          [facility]

 

       Metformin hydrochloride 500 MG Oral Tablet        2017 18:49:00    

    Yes                                

500 mg = 1 tab, PO, BID                                         [facility]

 

       gabapentin 600 MG Oral Tablet        2017 18:49:00        Yes      

                          600 mg = 1 

tab, PO, BID                                                [facility]

ter

 

       Lorazepam 1 MG Oral Tablet        2017 18:49:00        Yes         

                       1 mg = 1 tab, PO,

PRN                                                         [facility]

ter

 

                    12 HR Oxycodone Hydrochloride 40 MG Extended Release Tablet 

[Oxycontin]                     

2017 18:49:00        Yes                                40 mg = 1 tab, PO,

 Q12H               [facility]

 

      Diclofenac Sodium 0.01 MG/MG Topical Gel       2017 18:49:00       N

o                            TOP   

                                                    [facility]

 

                    Regular Insulin, Human 100 UNT/ML Injectable Solution [Novol

in R]                     2017 

18:49:00        No                                 10 unit, SUB-Q, PRN          

     [facility]

 

      ReliOn/NovoLIN 2017 18:49:00       No                   

         30 unit, SUB-Q, Daily       

                                        [facility]

 

       escitalopram 20 mg oral tablet        2017 18:49:00        Yes     

                           20 mg = 1 

tab, PO, Daily                                              [facility]

 

        Lidocaine Hydrochloride 0.03 MG/MG Topical Gel         2017 18:49:

00         No                              

                1 appl, TOP                                     [facility]

 

     Oxycontin      2015 02:00:00      No                       Notes: ([facility] as: OxyContin)           [facility]

 

      Dexamethasone       2015 02:00:00       No                          

  Notes: Concentration: 4mg/ml       

                                        [facility]

 

       Vitamin B-12 500 mcg oral tablet        2015 01:49:00        Yes   

                             500 

microgram = 1 tab, PO, Daily                                         [facility]

 

      Calcium 600 +D oral tablet       2015 01:49:00       Yes            

               1 tab, PO, Daily 

                                                    [facility]

 

     multivitamin      2015 01:49:00      Yes                      1 tab, 

PO, Daily           [facility]

 

       ferrous sulfate 325 MG Oral Tablet        2015 01:49:00        Yes 

                               325 mg = 

1 tab, PO, Daily                                            [facility]

ter

 

       lisinopril 2.5 mg oral tablet        2015 01:48:00        Yes      

                          2.5 mg = 1 

tab, PO, Daily, # 30 tab                                         [facility]

 

       NovoLIN 70/30        2015 01:47:00        Yes                      

          20 - 30 units, SUB-Q, TID-

Before Meals                                                [facility]

ter

 

          ceFAZolin (SCIP) + Sodium Chloride 0.9%  mL           2015

 01:30:00           No                   

                                                            Notes: (Same As: Felicia

ef, Kefzol)  Cefazolin **FOR IV SET ONLY**   *** 

MEDICATION WASTE *** Product Size:  1000 mg Product Wasted:  ___ mg             

                            [facility]

 

       Ofirmev        2015 23:00:00        No                             

    Notes: Infuse over 15 minutes  Do not

exceed 4gm/day of acetaminophen    *** MEDICATION WASTE *** Product Size:  1000 
mg Product Wasted:  _0__ mg                                         [facility]

 

       ketOROLAC 30 mg/mL injectable solution        2015 23:00:00        

No                                  4 

days   *** MEDICATION WASTE *** Product Size:  30 mg Product Wasted:  __0_ mg   

                        

                                        [facility]

 

       Docusate Sodium 100 MG Oral Capsule [Colace]        2015 22:00:00  

      No                                 

Notes: (Same as: Colace) (Do Not Crush)                                         

[facility]

 

       benzocaine-menthol topical        2015 19:58:00        No          

                       Notes: Cepacol 

lozenges  Dispense 1 box = 16 lozenges  (Same As: Cepacol Lozenges)             

                            [facility]

 

       Ondansetron        2015 18:56:00        No                         

        Notes: (Same as: Zofran)   *** 

MEDICATION WASTE *** Product Size:  4 mg Product Wasted:  ___ mg                

                         [facility]

 

     Naloxone      2015 18:56:00      No                       Notes: (Gómez

e as: Narcan)           [facility]

 

     Flumazenil      2015 18:56:00      No                       Notes: (S

josé luis as: Romazicon)           [facility]

 

       Meperidine        2015 18:56:00        No                          

       Notes: (Same as: Demerol)  "Use 

Precaution in Elderly, Seizure disorders, and Renal impairment"                 

                        [facility]

 

     Hydromorphone      2015 18:56:00      No                       Notes:

 Same as: Dilaudid           [facility]

 

       Labetalol        2015 18:56:00        No                           

      10 mg, 2 mL, Route: IVP, Drug form:

 INJ, Q5Min, Dosing Weight 88.636, kg, PRN Elevated BP, Start date: 04/16/15 
13:56:00, Duration: 5 doses or times, Stop date: Limited # of times             

                            [facility]

 

       Insulin regular        2015 18:20:00        No                     

              60 units)  Stable for 28 

days at room temperature Expires in ______ days from ______________Date         

                                [facility]

 

       Dextrose 50% Syringe        2015 18:20:00        No                

                 12.5 gm, 25 mL, Route: 

IVP, Drug Form: INJ, Dosing Weight 88.636, kg, PRN, PRN Blood Glucose Results, 
Start date: 04/16/15 13:20:00, Duration: 30 day, Stop date: 05/16/15 13:19:00   

                        

                                        [facility]

 

       Glucagon        2015 18:20:00        No                            

     1 mg, Route: IM, Drug form: PDR/INJ,

 PRN, Dosing Weight 88.636, kg, PRN Blood Glucose Results, Start date: 04/16/15 
13:20:00, Duration: 30 day, Stop date: 05/16/15 13:19:00                        

                 [facility]

 

     Dilaudid      2015 18:20:00      No                       Notes: Same

 as: Dilaudid           [facility]

 

     Dilaudid      2015 18:19:00      No                       Notes: Same

 as: Dilaudid           [facility]

 

       Oxycodone Hydrochloride 5 MG Oral Tablet        2015 18:16:00      

  No                                 

Notes: (Same as: Roxicodone)                                         [facility]

 

       Zofran        2015 18:15:00        No                              

   Notes: (Same as: Zofran)   *** 

MEDICATION WASTE *** Product Size:  4 mg Product Wasted:  ___ mg                

                         [facility]

 

       normal saline 0.9% IV 1,000 mL        2015 18:15:00        No      

                           1,000 mL, 

Rate: 75 ml/hr, Infuse over: 13.3 hr, Route: IV, Dosing Weight 88.636 kg, Total 
Volume: 1,000, Start date: 04/16/15 13:15:00, Duration: 30 day, Stop date: 
05/16/15 13:14:00                                           [facility]

 

       Phenergan        2015 18:15:00        No                           

      Notes: Do not give IV push.  (Same 

as: Phenergan)                                              [facility]

ter

 

     Ambien      2015 18:15:00      No                       Notes: (Same 

As: Ambien)           [facility]

 

       Cepacol Lozenge        2015 18:15:00        No                     

            1 lozenge, Route: MUCOUS MEM,

 Q2H, Drug form: ALEXIS, PRN Sore Throat, Start date: 04/16/15 13:15:00, Duration: 
30 day, Stop date: 05/16/15 13:14:00                                         [facility]

 

       Ancef         2015 18:12:00        No                              

   2 gm, Route: IVPB, Drug form: INJ, 

ONCE, Dosing Weight 88.636, kg, Start date: 04/16/15 13:12:00, Duration: 1 doses
 or times, Stop date: 04/16/15 13:12:00                                         

[facility]

 

       ceFAZolin        2015 12:00:00        No                           

      2 gm, 50 mL, Route: IVPB, Drug 

form: INJ, PRE OP, Start date: 04/16/15 7:00:00, Duration: 1 day, Stop date: 
04/17/15 6:59:00                                            [facility]

ter

 

       NovoLIN 70/30        2015 15:04:00        No                       

          SUB-Q, TID-Before Meals, 0 

Refill(s)                                                   [facility]

 

                          Acetaminophen 325 MG / Hydrocodone Bitartrate 10 MG Or

al Tablet [Norco 10/325]  

        2015 15:04:00         Yes                                     1-2 

tab, PO, Q4-6H, PRN Pain, # 30 tab, 0 

Refill(s)                                                   [facility]

ter

 

       Flexeril        2015 15:04:00        Yes                           

     5 mg, PO, TID, PRN Muscle Spasm, # 

30 tab, 0 Refill(s)                                         [facility]

 

                          Hydrocodone Bit/Acetaminophen (Norco 5-325 Tablet) 1 E

ach TABLET Hydrocodone 

Bit/Acetaminophen (Norco 5-325 Tablet) 1 Each TABLET                 Yes        

             1               Three Times

A Day                                                       [facility]

 

                          Insulin Human Isophan/Regular (Novolin 70-30 100 Unit/

Ml Vial) 100 Units/Ml ML 

Insulin Human Isophan/Regular (Novolin 70-30 100 Unit/Ml Vial) 100 Units/Ml ML  

              Yes                                Before Meals And At Bedtime    

           [facility]

 

     Lisinopril Lisinopril           Yes            10        Daily           [facility]

 

                    Oxycodone Hcl (Roxicodone) 30 Mg TABLET Oxycodone Hcl (Roxic

odone) 30 Mg TABLET 

              Yes                  40            Twice A Day               [facility]

 

     Tizanidine Hcl Tizanidine Hcl           Yes            4         As Needed 

          [facility]

 

     Hydrochlorothiazide Hydrochlorothiazide      2014 00:00:00 No        

                              

[facility]

 

     Meloxicam Meloxicam      2014 00:00:00 No                            

          [facility]







Vital Signs





             Vital Name   Observation Time Observation Value Comments     Source

 

             Body Temperature 2020 05:18:00 98.0 [degF]               [facility]

 

             Weight       2020 23:42:00 205 [lb_av]               [facility]

 

             BMI (Body Mass Index) 2020 23:42:00 28.6 kg/m2               

 [facility]

 

             Systolic blood pressure 2020 18:15:00 145 mm[Hg]             

   [facility]

 

             Diastolic blood pressure 2020 18:15:00 88 mm[Hg]             

    [facility]

 

             Heart rate   2020 18:15:00 60 /min                   [facility]

 

             Respiratory rate 2020 18:15:00 18 /min                   [facility]

 

                    Oxygen saturation in Arterial blood by Pulse oximetry  18:15:00 99 

/min                                                [facility]

 

             Body temperature 2020 14:52:00 37 Adrienne                    [facility]

 

             Body height  2020 14:52:00 177.8 cm                  [facility]

 

             Body weight  2020 14:52:00 83.915 kg                 [facility]

 

             BMI          2020 14:52:00 26.54 kg/m2               [facility]

 

             Temperature Oral (F) 2017 21:55:00 98.0 F                    

[facility]

 

             Respitory Rate 2017 21:21:00                           [facility]

 

             Heart Rate   2017 21:21:00                           [facility]

 

             Systolic (mm Hg) 2017 21:21:00                           [facility]

 

             Diastolic (mm Hg) 2017 21:21:00                           [facility]

 

             Temperature Oral (F) 2017 21:21:00 98.1 F                    

[facility]

 

             Heart Rate   2017 18:54:00                           [facility]

 

             Temperature Oral (F) 2017 18:54:00 98.3 F                    

[facility]

 

             Systolic (mm Hg) 2017 18:54:00                           [facility]

 

             Diastolic (mm Hg) 2017 18:54:00                           [facility]

 

             Respitory Rate 2017 18:54:00                            [facility]

 

             Heart Rate   2017 13:02:00                           [facility]

 

             Systolic (mm Hg) 2017 13:02:00                           [facility]

 

             Diastolic (mm Hg) 2017 13:02:00                           [facility]

 

             Respitory Rate 2017 13:02:00                            [facility]

 

             Height       2017 12:18:00 177.8 cm                  [facility]

 

             Weight       2017 12:18:00                           [facility]

 

             BMI Calculated 2017 12:18:00                            [facility]

 

             BMI Calculated 2017 17:30:00                            [facility]

 

             Weight       2017 17:30:00                           [facility]

 

             Height       2017 17:30:00 177.8 cm                  [facility]

 

             Respitory Rate 2015 09:51:00                            [facility]

 

             Heart Rate   2015 09:51:00                           [facility]

 

             Temperature Oral (F) 2015 09:51:00 98.1 F                    

[facility]

 

             Systolic (mm Hg) 2015 09:51:00                           [facility]

 

             Diastolic (mm Hg) 2015 09:51:00                           [facility]

 

             Respitory Rate 2015 04:52:00                            [facility]

 

             Heart Rate   2015 04:52:00                           [facility]

 

             Temperature Oral (F) 2015 04:52:00 98.4 F                    

[facility]

 

             Systolic (mm Hg) 2015 04:52:00                           [facility]

 

             Diastolic (mm Hg) 2015 04:52:00                           [facility]

 

             Respitory Rate 2015 03:15:00                           [facility]

 

             Temperature Oral (F) 2015 00:55:00 98.2 F                    

[facility]

 

             Systolic (mm Hg) 2015 00:55:00                           [facility]

 

             Diastolic (mm Hg) 2015 00:55:00                           [facility]

 

             Heart Rate   2015 00:55:00                           [facility]

 

             Weight       2015 22:14:00                           [facility]

 

             BMI Calculated 2015 17:13:00                           [facility]

 

             Weight       2015 17:13:00                           [facility]

 

             Height       2015 17:13:00 177.8 cm                  [facility]

 

             Weight       2015 17:15:00                           [facility]

 

             BMI Calculated 2015 17:15:00                           [facility]

 

             Height       2015 17:15:00 177.8 cm                  [facility]







Procedures





                Procedure       Date / Time Performed Performing Clinician Sour

e

 

                XR HIP 2-3 VIEWS LEFT 2020 17:14:07 Radames Del Cidist

 

                CT LUMBAR SPINE WO CONTRAST 2020 16:11:06 Radames Del Cid

 

                Gastric bypass                                  [facility] GABRIELA [place]

 

                Operation                                       [facility] OPID [place]

 

                Tonsillectomy and adenoidectomy                                 

[facility] OPILEXY [place]







Plan of Care





             Planned Activity Planned Date Details      Comments     Source

 

                    Future Scheduled Test 2020 00:00:00 INFLUENZA VACCINE 

[code = INFLUENZA 

VACCINE]                                            [facility]

 

                    Future Scheduled Test 2020-04-15 00:00:00 COLONOSCOPY SCREEN

ING [code = 

COLONOSCOPY SCREENING]                              [facility]

 

                    Future Scheduled Test 2020-04-15 00:00:00 SHINGLES VACCINES 

(#1) [code = 

SHINGLES VACCINES (#1)]                             [facility]

 

                    Future Scheduled Test 1980-04-15 00:00:00 DIABETIC FOOT EXAM

 [code = DIABETIC 

FOOT EXAM]                                          [facility] Scheduled Test 1980-04-15 00:00:00 URINE MICROALBUMIN

 [code = URINE 

MICROALBUMIN]                                       Abdi Palmer

 

                    Future Scheduled Test 1970 00:00:00 DIABETIC RETINAL E

YE EXAM [code = 

DIABETIC RETINAL EYE EXAM]                           Abdi Palmer

 

             Instructions              Medication Safety              [facility]







Encounters





             Start Date/Time End Date/Time Encounter Type Admission Type Attendi

[facility]   Care Department Encounter ID    Source

 

             2020 23:39:00 2020 23:39:00 Registered Emergency Room 1

            VÍCTOR 

CORY         [facility] [id number]    

I [facility]

 

        2020 00:00:00 2020 00:00:00 Emergency                 [facility]   

  064     [id number] 

[facility]

 

          2019 18:12:00 2019 04:59:00 Outpt Diag Services           

          [facility]   [facility] 

[facility] [place] [id number]               OP[facility] [place]

 

          2019 13:12:00 2019 23:59:00 Outpatient           [place]ampel, N

ehemia [facility]                    [id number]               

 

          2017 16:05:00 2017 22:55:00 Bedded Outpatient             

        [facility]          [id number]              [facility]

 

           2017 11:05:00 2017 17:55:00 Outpatient            Jose Carolina 

[facility]               [id number]         

 

          2015 18:52:00 2015 16:25:00 Inpatient                     

[facility]                  [id number]              [facility]

 

           2015 13:52:00 2015 11:25:00 Outpatient            Jose Carolina 

[facility]             [id number]         







Results





           Test Description Test Time  Test Comments Results    Result Comments 

Source

 

                CHEST SINGLE (PORTABLE) 2020 01:06:00                     

                              

                                                   [facility]                        [address], [place] [address]      Patient Name: SHANNA CRUMP JR                                
MR #: [id number]                  : 1970                               
   Age/Sex: 50/M  Acct #: [id number]                              Req #: [id number]
[id number]  Adm Physician:                                                      
Ordered by: CORY RENEE MD                         Report #: [id number]
       Location: ER                                      Room/Bed:              
    
________________________________________________________________________________

___________________    Procedure: [id number] DX/CHEST SINGLE (PORTABLE)  Exam 
Date: 20                            Exam Time: 35                       
                      REPORT STATUS: Signed    EXAMINATION:  CHEST SINGLE 
(PORTABLE)          INDICATION:         Weakness      COMPARISON:  None         
 FINDINGS:  AP view         TUBES and LINES:  None.      LUNGS:  Lungs are well 
inflated.  Lungs are clear. There is no evidence of   pneumonia or pulmonary 
edema.      PLEURA:  No pleural effusion or pneumothorax.      HEART AND 
MEDIASTINUM:  The cardiomediastinal silhouette is unremarkable.          BONES 
AND SOFT TISSUES:  No acute osseous lesion.  Soft tissues are   unremarkable.   
  UPPER ABDOMEN: No free air under the diaphragm.          IMPRESSION:    No 
acute thoracic radiographic abnormality.         Signed by: Tab Gillette MD 
on 2020 1:07 AM        Dictated By: TAB GILLETTE MD  Electronically 
Signed By: TAB GILLETTE MD on 20  Transcribed By: THIEN on 
20       COPY TO:   CORY RENEE MD                          

           

 

                    Blood leukocytes automated count (number/volume) 2020 

23:45:00   

 

                                        Test Item

 

             White Blood Count (test code = 6690-2) 5.20         4.8-10.8       

            





[facility] 
(number/volume)2020 23:45:00* 



             Test Item    Value        Reference Range Interpretation Comments

 

             Red Blood Count (test code = 789-8) 4.11         4.3-5.7           

         





[facility] measurement 
(moles/volume)2020 23:45:00* 



             Test Item    Value        Reference Range Interpretation Comments

 

             Hemoglobin (test code = 48436-1) 11.1         14.0-18.0            

      





[facility] Hospital[facility] blood hematocrit (volume 
fraction)2020 23:45:00* 



             Test Item    Value        Reference Range Interpretation Comments

 

             Hematocrit (test code = 4544-3) 34.5         38.2-49.6             

     





[facility] Hospital[facility]ated erythrocyte mean corpuscular 
iawjqy3564-86-67 23:45:00* 



             Test Item    Value        Reference Range Interpretation Comments

 

             Mean Corpuscular Volume (test code = 787-2) 83.9         81-99     

                 





[facility] Hospital[facility] erythrocyte mean corpuscular 
hemoglobin (mass per erythrocyte)2020 23:45:00* 



             Test Item    Value        Reference Range Interpretation Comments

 

             Mean Corpuscular Hemoglobin (test code = 785-6) 27.0         28-32 

                     





[facility] erythrocyte mean corpuscular 
hemoglobin concentration measurement (mass/volume)2020 23:45:00* 



             Test Item    Value        Reference Range Interpretation Comments

 

             Mean Corpuscular Hemoglobin Concent (test code = 786-4) 32.2       

  31-35                      





[facility] HospitalRDW XnlUr-Kbv5346-93-16 23:45:00* 



             Test Item    Value        Reference Range Interpretation Comments

 

             Red Cell Distribution Width (test code = 35249-8) 14.2         11.7

-14.4                  





[facility] blood platelet count 
(count/volume)2020 23:45:00* 



             Test Item    Value        Reference Range Interpretation Comments

 

             Platelet Count (test code = 777-3) 192          140-360            

        





[facility] 
count as percentage of total acgxwyfikj0054-22-63 23:45:00* 



             Test Item    Value        Reference Range Interpretation Comments

 

             Neutrophils (%) (Auto) (test code = 39951-2) 44.6         38.7-80.0

                  





[facility]Aut[facility] blood lymphocyte count as 
percentage ot total znkxivjjos5704-71-45 23:45:00* 



             Test Item    Value        Reference Range Interpretation Comments

 

             Lymphocytes (%) (Auto) (test code = 736-9) 45.2         18.0-39.1  

                





[facility] blood monocyte count as 
percentage of total kkiifqccvl1040-57-71 23:45:00* 



             Test Item    Value        Reference Range Interpretation Comments

 

             Monocytes (%) (Auto) (test code = 5905-5) 6.7          4.4-11.3    

               





[facility] Hospital[facility] blood eosinophil count as 
percentage of total xfhkdzcfww5008-45-67 23:45:00* 



             Test Item    Value        Reference Range Interpretation Comments

 

             Eosinophils (%) (Auto) (test code = 713-8) 2.7          0.0-6.0    

                





[facility] Hospital[facility] blood basophil count as 
percentage of total kcdhunrpsm4363-70-62 23:45:00* 



             Test Item    Value        Reference Range Interpretation Comments

 

             Basophils (%) (Auto) (test code = 706-2) 0.6          0.0-1.0      

              





[facility] HospitalFluoroscopic procedure less than one hour
hnnqhhmh0528-99-50 23:45:00* 



             Test Item    Value        Reference Range Interpretation Comments

 

             IM GRANULOCYTES % (test code = IM GRANULOCYTES %) 0.2          0.0-

1.0                    





[facility] HospitalAutom[facility] blood neutrophil count
2020 23:45:00* 



             Test Item    Value        Reference Range Interpretation Comments

 

             Neutrophils # (Auto) (test code = 751-8) 2.3          2.1-6.9      

              





[facility] Hospital[facility] lymphocytes count (number/volume)
2020 23:45:00* 



             Test Item    Value        Reference Range Interpretation Comments

 

             Lymphocytes # (Auto) (test code = 76311-6) 2.4          1.0-3.2    

                





[facility] Hospital[facility] monocytes automated count 
(number/volume)2020 23:45:00* 



             Test Item    Value        Reference Range Interpretation Comments

 

             Monocytes # (Auto) (test code = 742-7) 0.4          0.2-0.8        

            





[facility] HospitalAutom[facility] blood eosinophil count
2020 23:45:00* 



             Test Item    Value        Reference Range Interpretation Comments

 

             Eosinophils # (Auto) (test code = 711-2) 0.1          0.0-0.4      

              





[facility] HospitalAutom[facility] blood basophil count 
(count/volume)2020 23:45:00* 



             Test Item    Value        Reference Range Interpretation Comments

 

             Basophils # (Auto) (test code = 704-7) 0.0          0.0-0.1        

            





[facility] HospitalFluoroscopic procedure less than one hour
asdmyxiq1605-70-95 23:45:00* 



             Test Item    Value        Reference Range Interpretation Comments

 

                                        Absolute Immature Granulocyte (auto (srini

t code = Absolute Immature Granulocyte 

(auto)          0.01            0-0.1                            





[facility] sodium [facility] 
(moles/volume)2020 23:45:00* 



             Test Item    Value        Reference Range Interpretation Comments

 

             Sodium Level (test code = 2951-2) 135          136-145             

       





[facility] potassium measurement 
(moles/volume)2020 23:45:00* 



             Test Item    Value        Reference Range Interpretation Comments

 

             Potassium Level (test code = 2823-3) 4.3          3.5-5.1          

          





[facility] chloride measurement 
(moles/volume)2020 23:45:00* 



             Test Item    Value        Reference Range Interpretation Comments

 

             Chloride Level (test code = 2075-0) 101                      

         





[facility] carbon dioxide, total 
measurement (moles/volume)2020 23:45:00* 



             Test Item    Value        Reference Range Interpretation Comments

 

             Carbon Dioxide Level (test code = 2028-9) 23           22-29       

               





[facility] or plasma anion vyy5863-90-88 
23:45:00* 



             Test Item    Value        Reference Range Interpretation Comments

 

             Anion Gap (test code = 33037-3) 15.3         8-16                  

     





[facility] urea nitrogen measurement
(mass/volume)2020 23:45:00* 



             Test Item    Value        Reference Range Interpretation Comments

 

             Blood Urea Nitrogen (test code = 3094-0) 13           7-26         

              





[facility] creatinine measurement 
(mass/volume)2020 23:45:00* 



             Test Item    Value        Reference Range Interpretation Comments

 

             Creatinine (test code = 2160-0) 1.06         0.72-1.25             

     





[facility] urea nitrogen/creatinine 
mass qkxua5135-68-60 23:45:00* 



             Test Item    Value        Reference Range Interpretation Comments

 

             BUN/Creatinine Ratio (test code = 3097-3) 12           6-25        

               





[facility] HospitalEstimated glomerular filtration rate 
(GFR) shychgxgdooun5159-95-25 23:45:00* 



             Test Item    Value        Reference Range Interpretation Comments

 

             Estimat Glomerular Filtration Rate (test code = 303841868) > 60    

     >60                        





Ranges were taken from the National Kidney Disease Education Program and the [facility] Kidney Foundation literature.Reference ranges:60 or greater: Iqyppu58-03 (
for 3 consecutive months): Chronic kidney disease 15 or less: Kidney failureEl Paso Children's HospitalGlucose edpvkhkcjmn9059-23-07 23:45:00* 



             Test Item    Value        Reference Range Interpretation Comments

 

             Glucose Level (test code = GQL9083) 268                      

         





[facility] calcium measurement 
(mass/volume)2020 23:45:00* 



             Test Item    Value        Reference Range Interpretation Comments

 

             Calcium Level (test code = 94570-7) 8.8          8.4-10.2          

         





[facility] plasma total bilirubin 
measurement (mass/volume)2020 23:45:00* 



             Test Item    Value        Reference Range Interpretation Comments

 

             Total Bilirubin (test code = 1975-2) 0.1          0.2-1.2          

          





[facility] HospitalFluoroscopic procedure less than one hour
pmyzaymp0690-46-71 23:45:00* 



             Test Item    Value        Reference Range Interpretation Comments

 

                                        Aspartate Amino Transf (AST/SGOT) (test 

code = Aspartate Amino Transf 

(AST/SGOT))     19              5-34                             





[facility] or plasma alanine aminotransferase 
measurement (enzymatic activity/volume)2020 23:45:00* 



             Test Item    Value        Reference Range Interpretation Comments

 

             Alanine Aminotransferase (ALT/SGPT) (test code = 1742-6) 20        

   0-55                       





[facility] protein measurement 
(mass/volume)2020 23:45:00* 



             Test Item    Value        Reference Range Interpretation Comments

 

             Total Protein (test code = 2885-2) 6.5          6.5-8.1            

        





[facility] albumin measurement 
(mass/volume)2020 23:45:00* 



             Test Item    Value        Reference Range Interpretation Comments

 

             Albumin (test code = 1751-7) 3.6          3.5-5.0                  

  





[facility] HospitalPlasma globulin measurement (mass/volume)
2020 23:45:00* 



             Test Item    Value        Reference Range Interpretation Comments

 

             Globulin (test code = 56782-5) 2.9          2.3-3.5                

    





[facility] albumin/globulin mass 
kbwjz4920-45-70 23:45:00* 



             Test Item    Value        Reference Range Interpretation Comments

 

             Albumin/Globulin Ratio (test code = 1759-0) 1.2          0.8-2.0   

                 





[facility] plasma alkaline phosphatase 
measurement (enzymatic activity/volume)2020 23:45:00* 



             Test Item    Value        Reference Range Interpretation Comments

 

             Alkaline Phosphatase (test code = 6768-6) 56                 

               





[facility] or plasma creatine kinase 
measurement (enzymatic activity/volume)2020 23:45:00* 



             Test Item    Value        Reference Range Interpretation Comments

 

             Creatine Kinase (test code = 2157-6) 73                      

          





[facility] plasma creatine kinase MB 
measurement (mass/volume)2020 23:45:00* 



             Test Item    Value        Reference Range Interpretation Comments

 

             Creatine Kinase MB (test code = 89993-4) 1.30         0-4.3        

              





[facility] or plasma troponin i.cardiac 
measurement (mass/volume)2020 23:45:00* 



             Test Item    Value        Reference Range Interpretation Comments

 

             Troponin I (test code = 96595-3) < 0.05       0.0-0.40             

      





[facility] HospitalUrine color ahkffznwuomcf8058-53-89 
01:19:00* 



             Test Item    Value        Reference Range Interpretation Comments

 

             Urine Color (test code = 5778-6) YELLOW       YELLOW               

      





[facility] HospitalUrine jfqnodr4752-90-01 01:19:00* 



             Test Item    Value        Reference Range Interpretation Comments

 

             Urine Clarity (test code = 80378-0) CLEAR        CLEAR             

         





[facility] Center[facility] gravity of Urine by Test strip
2020 01:19:00* 



             Test Item    Value        Reference Range Interpretation Comments

 

             Urine Specific Gravity (test code = 5811-5) 1.025        1.010-1.02

5                





[facility] HospitalUrine pH measurement by automated test 
llraf8892-98-87 01:19:00* 



             Test Item    Value        Reference Range Interpretation Comments

 

             Urine pH (test code = 42810-2) 6.5          5-7                    

    





[facility] HospitalUrine leukocyte esterase detection by 
rnfvbvfb5614-75-58 01:19:00* 



             Test Item    Value        Reference Range Interpretation Comments

 

             Urine Leukocyte Esterase (test code = 5799-2) NEGATIVE     NEGATIVE

                   





[facility] HospitalUrine nitrite trwhbaqyd8241-39-27 
01:19:00* 



             Test Item    Value        Reference Range Interpretation Comments

 

             Urine Nitrite (test code = 99012-9) NEGATIVE     NEGATIVE          

         





[facility] HospitalUrine protein measurement by test strip 
(mass/volume)2020 01:19:00* 



             Test Item    Value        Reference Range Interpretation Comments

 

             Urine Protein (test code = 5804-0) NEGATIVE     NEGATIVE           

        





[facility] HospitalUrine glucose jravszbzm3014-50-97 
01:19:00* 



             Test Item    Value        Reference Range Interpretation Comments

 

             Urine Glucose (UA) (test code = 2349-9) 2+           NEGATIVE      

             





[facility] HospitalUrine ketones detection by automated test
tnmjf1927-44-99 01:19:00* 



             Test Item    Value        Reference Range Interpretation Comments

 

             Urine Ketones (test code = 09138-2) NEGATIVE     NEGATIVE          

         





[facility] HospitalUrine opiates screening iwpo0265-61-57 
01:19:00* 



             Test Item    Value        Reference Range Interpretation Comments

 

             Urine Opiates Screen (test code = 77223-9) POSITIVE     NEGATIVE   

                





This test provides only a screen. Positive results should be repeated by a confi
rmatory test.TEST RE RUN WHIT OPI (POSITIVE )  ---  20 0254 ---OPIATES pre
viously reported as:  NEGATIVE ALL TESTS PERFORMED MANUALLY ON [employer] TOX/SEE TE
[facility]Bar[facility] screen, gamof9429-48-14 
01:19:00* 



             Test Item    Value        Reference Range Interpretation Comments

 

             Urine Barbiturates Screen (test code = 159233312) NEGATIVE     NEGA

TIVE                   





[facility] HospitalUrine phencyclidine detection by 
screening scypra3260-29-47 01:19:00* 



             Test Item    Value        Reference Range Interpretation Comments

 

             Urine Phencyclidine Screen (test code = 49277-1) NEGATIVE     NEGAT

ANA                   





[facility] HospitalUrine amphetamines detection by screen 
method > 1000 ng/eI7947-78-29 01:19:00* 



             Test Item    Value        Reference Range Interpretation Comments

 

             Urine Amphetamines Screen (test code = 48457-6) NEGATIVE     NEGATI

VE                   





[facility] HospitalFluoroscopic procedure less than one hour
ntgmuret7015-71-45 01:19:00* 



             Test Item    Value        Reference Range Interpretation Comments

 

                          Urine Methamphetamines Screen (test code = Urine Metha

mphetamines Screen) 

NEGATIVE            NEGATIVE                                 





[facility] HospitalUrine benzodiazepines detection by 
screening nghawc8716-85-89 01:19:00* 



             Test Item    Value        Reference Range Interpretation Comments

 

             Urine Benzodiazepines Screen (test code = 03258-9) POSITIVE     NEG

ATIVE                   





This test provides only a screen. Positive results should be repeated by a confi
rmatory test.[facility] HospitalU[facility] cocaine measurement 
(mass/volume)2020 01:19:00* 



             Test Item    Value        Reference Range Interpretation Comments

 

             Urine Cocaine Screen (test code = 3398-5) NEGATIVE     NEGATIVE    

               





[facility] HospitalU[facility] cannabinoids detection by screening
ntoemg5155-00-14 01:19:00* 



             Test Item    Value        Reference Range Interpretation Comments

 

             Urine Cannabinoids Screen (test code = 04849-7) NEGATIVE     NEGATI

VE                   





***THESE RESULTS ARE FOR MEDICAL TREATMENT ONLY****THIS REPORT CONTAINS UNCONFIR
MED SCREENING RESULTS*POSITIVE RESULTS WILL BE CONFIRMED BY REFERENCE LAB UPON R
EQUEST                           CUT-OFFDRUG CLASS              CONCENTRATION   
                       ng/mLAmphetamines               1000Methamphetamines     
     1000Cocaine                     300Opiate                      300Phencyc
lidine                25Cannabinoid                  50Barbiturates             
  300Benzodiazepine              300Methadone                   300[facility] Center[facility] meth[facility]one [id number] 01:19:00* 



             Test Item    Value        Reference Range Interpretation Comments

 

             Urine Methadone Screen (test code = 19550-3) NEGATIVE     NEGATIVE 

                  





***THESE RESULTS ARE FOR MEDICAL TREATMENT ONLY****THIS REPORT CONTAINS UNCONFIR
MED SCREENING RESULTS*POSITIVE RESULTS WILL BE CONFIRMED BY REFERENCE LAB UPON R
EQUEST                           CUT-OFFDRUG CLASS              CONCENTRATION   
                       ng/mLAmphetamines               1000Methamphetamines     
     1000Cocaine Metabolite          300Opiate                      300Phencyc
lidine                25Cannabinoid                  50Barbiturates             
  300Benzodiazepine              300Methadone                   300[facility] CenterUrine urobilinogen measurement by test strip 
(mass/volume)2020 01:19:00* 



             Test Item    Value        Reference Range Interpretation Comments

 

             Urine Urobilinogen (test code = 19408-9) 0.2          0.2-1        

              





[facility] 
(mass/volume)2020 01:19:00* 



             Test Item    Value        Reference Range Interpretation Comments

 

             Urine Bilirubin (test code = 1978-6) NEGATIVE     NEGATIVE         

          





[facility] erythrocytes [id number] 
01:19:00* 



             Test Item    Value        Reference Range Interpretation Comments

 

             Urine Blood (test code = 02893-8) NEGATIVE     NEGATIVE            

       





[facility] Hospital[facility] sediment leukocyte count 
by microscopy (number/high power field)2020 01:19:00* 



             Test Item    Value        Reference Range Interpretation Comments

 

             Urine WBC (test code = 5821-4) 0-5          0-5                    

    





[facility]rythrocytes detection in urine sediment 
by light alnnjvcklf5470-63-21 01:19:00* 



             Test Item    Value        Reference Range Interpretation Comments

 

             Urine RBC (test code = 14601-5) NONE         0-5                   

     





[facility] Hospital[facility]acteria detection in urine sediment by 
light yvdtheyama8428-56-84 01:19:00* 



             Test Item    Value        Reference Range Interpretation Comments

 

             Urine Bacteria (test code = 14323-9) FEW          NONE             

          





[facility] Hospital[facility]pithelial cells detection in urine 
sediment by light ukuzdnsxum6990-29-93 01:19:00* 



             Test Item    Value        Reference Range Interpretation Comments

 

             Urine Epithelial Cells (test code = 84723-8) FEW          NONE     

                  





[facility] W/O [id number] 
17:11:00  Name: SHANNA CRUMP JR            [facility]
    : 1970 Age/S: 49  / M         [address] [facility]           Unit 
#: [id number]     Loc:               [place] [address]                Phys: 
Holly Sanchez MD                                                   Acct: 
[id number]  Dis Date:               Status: REG ER                            
     PHONE #: [phone]     Exam Date: 2020  1631                     
FAX #: [phone]      Reason: fall, headache, neck pain, left shoulder       
    EXAMS:                                               CPT CODE:      
[id number] CT C-SPINE W/O CONTRAST                    [address]                    
HISTORY:  fall, headache, neck pain, left shoulder               TECHNIQUE: 
Noncontrast 2.5 mm axial CT of the head and cervical spine.       Examination 
acquired within 24 hours of arrival. Automated exposure       control for dose 
reduction.               COMPARISON: CT scan of the brain and cervical spine 
2020               FINDINGS:               No lacerations or 
contusions of the scalp or facial soft tissues.        Calvarium and skull base 
are intact.               No acute hemorrhage. No intracranial mass, mass 
effect, or midline       shift. No effacement of the sulci or grey-white matter 
interface.                No cortical atrophy.  No signs of white matter small-
vessel disease.               No hydrocephalus.. No extra-axial fluid 
collection.                Visualized paranasal sinuses are clear.        
Mastoid air cells and middle ear cavities are clear.        Orbital contents are
unremarkable.                No acute fracture of the cervical spine. No 
subluxation.       Craniocervical and cervicothoracic articulations are a
ppropriate.                There is height loss of the C4 vertebral body as well
as the C4-C5 and       C5-C6 disc spaces. Prominent vertebral body osteophytes 
are also       present on C4 and C5.       There is moderate right-sided and sev
ere left-sided foraminal       narrowing at C4-C5.               No prevertebral
or paraspinal soft tissue abnormality.        Lung apices are clear.            
     IMPRESSION:                   Negative CT head.         Degenerative liu
ges are present in the cervical spine, similar to the         prior examination.
However no fracture or malalignment is appreciated.                   Location: 
RR       PAGE  1                       Signed Report                    (CONTIN
UED)   Name: SHANNA CRUMP JR            [facility]   
 : 1970 Age/S: 49  / M         [address] [facility]           Unit #: 
[id number]     Loc:               [place] [address]                Phys: Holly Sanchez MD                                                   Acct: [id number]
4  Dis Date:               Status: REG ER                                  PHONE
#: [phone]     Exam Date: 2020  1631                     FAX #: [phone]
[phone]      Reason: fall, headache, neck pain, left shoulder            EXAMS:
                                              CPT CODE:      [id number] CT C-SPI
NE W/O CONTRAST                    03276               <Continued>      ** 
Electronically Signed by Robert Hatfield MD on 2020 at 1711 **                
     Reported and signed by: Robert Hatfield MD                                     
 CC: Holly Sanchez MD; Jayson Caal                                       
                                                            Technologist:Joanna Santiago                     CTDI:        DLP:        Trnscb Date/Time: 2020
() t.SDR.RR31                       Orig Print D/T: S: 2020 (0977)    
 PAGE  2                       Signed Report                               - CT 
HEAD/BRAIN W/O [id number] 17:11:00  Name: SHANNA CRUMP [facility]     : 1970 Age/S: 49  / M         [address] 
[facility]           Unit #: [id number]     Loc:               [place] [address]                Phys: Holly Sanchez MD                                 
                 Acct: [id number]  Dis Date:               Status: REG ER     
                            PHONE #: [phone]     Exam Date: 2020  
1627                     FAX #: [phone]      Reason: fall, headache, neck 
pain, left shoulder            EXAMS:                                           
   CPT CODE:      [id number] CT HEAD/BRAIN W/O CONT                     83364    
               HISTORY:  fall, headache, neck pain, left shoulder               
TECHNIQUE: Noncontrast 2.5 mm axial CT of the head and cervical spine.       
Examination acquired within 24 hours of arrival. Automated exposure       
control for dose reduction.               COMPARISON: CT scan of the brain and 
cervical spine 2020               FINDINGS:               No 
lacerations or contusions of the scalp or facial soft tissues.        Calvarium 
and skull base are intact.               No acute hemorrhage. No intracranial 
mass, mass effect, or midline       shift. No effacement of the sulci or grey-
white matter interface.                No cortical atrophy.  No signs of white 
matter small-vessel disease.               No hydrocephalus.. No extra-axial 
fluid collection.                Visualized paranasal sinuses are clear.        
Mastoid air cells and middle ear cavities are clear.        Orbital contents are
unremarkable.                No acute fracture of the cervical spine. No 
subluxation.       Craniocervical and cervicothoracic articulations are a
ppropriate.                There is height loss of the C4 vertebral body as well
as the C4-C5 and       C5-C6 disc spaces. Prominent vertebral body osteophytes 
are also       present on C4 and C5.       There is moderate right-sided and sev
ere left-sided foraminal       narrowing at C4-C5.               No prevertebral
or paraspinal soft tissue abnormality.        Lung apices are clear.            
     IMPRESSION:                   Negative CT head.         Degenerative liu
ges are present in the cervical spine, similar to the         prior examination.
However no fracture or malalignment is appreciated.                   Location: 
       PAGE  1                       Signed Report                    (CONTIN
UED)   Name: SHANNA CRUMP JR            [facility]   
 : 1970 Age/S: 49  / M         [address] [place] [address]           Unit #: 
[id number]     Loc:               [place] [address]                Phys: Holly Sanchez MD                                                   Acct: [id number]
4  Dis Date:               Status: REG ER                                  PHONE
#: [phone]     Exam Date: 2020  1627                     FAX #: [phone]
[phone]      Reason: fall, headache, neck pain, left shoulder            EXAMS:
                                              CPT CODE:      [id number] CT HEAD/
BRAIN W/O CONT                     [address]376               <Continued>      ** 
Electronically Signed by Robert Hatfield MD on 2020 at 1711 **                
     Reported and signed by: Robert Hatfield MD                                     
 CC: Holly Sanchez MD; Jayson Caal                                       
                                                            Technologist:Joanna Santiago                     CTDI:        DLP:        Trnscb Date/Time: 2020
(171) t.SDR.RR31                       Orig Print D/T: S: 2020 (1714)    
 PAGE  2                       Signed Report                               - XR 
SHOULDER 2 + V [id number] 17:05:00  Name: SHANNA CRUMP JR             
[facility]    : 1970 Age/S:49  /M            [address] [place] [address]           Unit#:[id number]     Loc: [place] 
[place] [address]               Phys: Holly Sanchez MD                                  
               Acct#: [id number] Dis Date:                   PHONE #: 
[phone]      Status: REG ER                                   FAX #: 
[phone]      Exam Date: 2020           Reason: fall, headache, neck 
pain, left shoulder            EXAMS:                                           
   CPT CODE:      [id number] XR SHOULDER 2 + V LT                       63506    
               HISTORY:  fall, headache, neck pain, left shoulder               
TECHNIQUE: Internal/external rotation AP and scapular Y-views of the       left 
shoulder.               FINDINGS:                 No acute fracture.            
   Glenohumeral and acromioclavicular joints are not dislocated.                
Articulating surfaces appear preserved.               Regional soft tissues are 
unremarkable.               Visualized thorax is within normal limits.          
              IMPRESSION: Negative radiographic examination of the left 
shoulder.                   Location: RR          ** Electronically Signed by 
Robert Hatfield MD on 2020 at 1705 **                      Reported and signed
by: Robert Hatfield MD                   CC: Holly Sanchez MD; Jayson Caal   
                                                                                
               Technologist: Joanna Santiago                                        
   Trnscrpt Data: 2020 (1705) t.SDR.RR31                         Orig 
Print D/T: S: 2020 (3134)                         PAGE  1                 
     Signed Report                               BASIC METABOLIC [id number]
22:55:00* 



             Test Item    Value        Reference Range Interpretation Comments

 

             SODIUM (test code = NA) 140 mmol/L   136-145      N             

 

             POTASSIUM (test code = K) 3.5 mmol/L   3.5-5.1      N             

 

             CHLORIDE (test code = CL) 108.0 mmol/L        H             

 

             CARBON DIOXIDE (test code = CO2) 27.0 mmol/L  21-32        N       

      

 

             ANION GAP (test code = GAP) 8.5          10-20        L            

 

 

             GLUCOSE (test code = GLU) 198 mg/dL           H             

 

             BLOOD UREA NITROGEN (test code = BUN) 8 mg/dL      7-18         N  

           

 

             GLOMERULAR FILTRATION RATE (test code = GFR) > 60 mL/min  >=60     

                 Estimated GFR by

using Modified MDRD formula.Chronic kidney disease is defined as either kidney 
damageor GFR <60 mL/min/1.73 m2 for >3 months.

 

             CREATININE (test code = CREAT) 0.80 mg/dL   0.7-1.3      N         

    

 

             BUN/CREATININE RATIO (test code = BUN/CREA) 10.0         10-20     

   N             

 

             CALCIUM (test code = CA) 9.2 mg/dL    8.5-10.1     N             





BASIC METABOLIC [id number] 22:42:00* 



             Test Item    Value        Reference Range Interpretation Comments

 

             SODIUM (test code = NA) 140 mmol/L   136-145      N             

 

             POTASSIUM (test code = K) 3.5 mmol/L   3.5-5.1      N             

 

             CHLORIDE (test code = CL) 108.0 mmol/L        H             

 

             CARBON DIOXIDE (test code = CO2)  mmol/L      21-32                

      

 

             ANION GAP (test code = GAP)              10-20                     

 

 

             GLUCOSE (test code = GLU)  mg/dL                            

 

             BLOOD UREA NITROGEN (test code = BUN)  mg/dL       7-18            

           

 

             GLOMERULAR FILTRATION RATE (test code = GFR)  mL/min      >=60     

                  

 

             CREATININE (test code = CREAT)  mg/dL       0.7-1.3                

    

 

             BUN/CREATININE RATIO (test code = BUN/CREA)              10-20     

                 

 

             CALCIUM (test code = CA)  mg/dL       8.5-10.1                   





- CT L-SPINE W/O [id number] 22:39:00  Name: THERONSHANNA EMNAUELUR [facility]                     : 1970 Age/S: 49  / M         
[address] [facility]                Unit #: [id number]     Loc:               
[place]  [address]              Phys: Blas Lester MD                          
                           Acct: [id number]  Dis Date:               Status: 
REG ER                                  PHONE #: [phone]     Exam Date: 
2020                     FAX #: [phone]      Reason: trauma, 
back pain, neuro Sx                         EXAMS:                              
                CPT CODE:      [id number] CT L-SPINE W/O CONTRAST                
   [address]                    EXAM:  - CT T-SPINE W/O CONTRAST,  - CT L-SPINE W/O 
CONTRAST               HISTORY: Fall.               TECHNIQUE: Axial tomograms 
through thoracic and lumbar spine were       obtained without intravenous 
contrast. Sagittal and coronal       reformatted images are provided.       This
exam was performed according to our departmental       dose-optimization 
program, which includes automated exposure control,       adjustment of the mA 
and/or kV according to patient size and/or use of       iterative reconstruction
technique.               COMPARISON: None available time of interpretation.     
         FINDINGS:               Vertebral heights and alignment are maintained.
No acute fracture.       There is no subluxation or spondylolisthesis.        
The soft tissues are within normal limits.        Degenerative disc disease and 
facet arthropathy is present at multiple       levels particularly in lower 
lumbar spine at L4-L5 level associated       with endplate changes and posterior
osteophytes.                 IMPRESSION:                   No acute osseous 
abnormality with other findings as above.           ** Electronically Signed by 
Armani Medel MD on 2020 at 2239 **                      Reported and 
signed by: Armani Medel MD             CC: Jayson Caal; Blas Lester MD  
                                                                                
                   Technologist:SHANNA MCNEAL RT(R)      CT    CTDI:        
DLP:        Trnscb Date/Time: 2020 () ManeMKM4                      
Orig Print D/T: S: 2020 (1988)      PAGE  1                       Signed 
Report                               - CT T-SPINE W/O [id number] 
22:39:00  Name: SHANNA CRUMP [facility]                
    : 1970 Age/S: 49  / M         [address] [facility]                Unit 
#: [id number]     Loc:               [place]  [address]              Phys: 
Blas Lester MD                                                      Acct: 
[id number]  Dis Date:               Status: REG ER                            
     PHONE #: [phone]     Exam Date: 2020                     
FAX #: [phone]      Reason: trauma, back pain, neuro Sx                    
    EXAMS:                                               CPT CODE:      
[id number] CT T-SPINE W/O CONTRAST                    [address]                    
EXAM:  - CT T-SPINE W/O CONTRAST,  - CT L-SPINE W/O CONTRAST               
HISTORY: Fall.               TECHNIQUE: Axial tomograms through thoracic and 
lumbar spine were       obtained without intravenous contrast. Sagittal and 
coronal       reformatted images are provided.       This exam was performed 
according to our departmental       dose-optimization program, which includes 
automated exposure control,       adjustment of the mA and/or kV according to 
patient size and/or use of       iterative reconstruction technique.            
  COMPARISON: None available time of interpretation.               FINDINGS:    
          Vertebral heights and alignment are maintained. No acute fracture.    
  There is no subluxation or spondylolisthesis.        The soft tissues are 
within normal limits.        Degenerative disc disease and facet arthropathy is 
present at multiple       levels particularly in lower lumbar spine at L4-L5 
level associated       with endplate changes and posterior osteophytes.         
       IMPRESSION:                   No acute osseous abnormality with other 
findings as above.           ** Electronically Signed by Armani Medel MD on 
2020 at 2239 **                      Reported and signed by: 
Armani Medel MD             CC: Jayson Caal; Blas Lester MD             
                                                                                
        Technologist:RT ROBBIN(R)      CT    CTDI:        DLP:        
Trnscb Date/Time: 2020 () ManeMKM4                       Orig Print 
D/T: S: 2020 (5581)      PAGE  1                       Signed Report      
                        CBC W/O [id number] 22:34:00* 



             Test Item    Value        Reference Range Interpretation Comments

 

             WHITE BLOOD CELL (test code = WBC) 5.1 K/mm3    4.5-12.5     N     

        

 

             RED BLOOD CELL (test code = RBC) 4.35 mill/mm3 4.0-5.8      N      

       

 

             HEMOGLOBIN (test code = HGB) 11.8 gram/dL 13.0-17.5    L           

  

 

             HEMATOCRIT (test code = HCT) 35.7 %       42.0-52.0    L           

  

 

             MEAN CELL VOLUME (test code = MCV) 82.1 fL      80-98        N     

        

 

             MEAN CELL HGB (test code = MCH) 27.1 picogram 27.0-33.0    N       

      

 

             MEAN CELL HGB CONCETRATION (test code = MCHC) 33.1 gram/dL 33.0-36.

0    N             

 

             RED CELL DISTRIBUTION WIDTH (test code = RDW) 12.8 %       11.6-16.

2    N             

 

             PLATELET COUNT (test code = PLT) 214 K/mm3    150-450      N       

      

 

             MEAN PLATELET VOLUME (test code = MPV) 10.9 fL      6.7-11.0     N 

            





- CT C-SPINE W/O [id number] 22:20:00  Name: THERONSHANNA JAZMÍN [facility]                     : 1970 Age/S: 49  / M         
[address] [facility]                Unit #: [id number]     Loc:               
[place]  [address]              Phys: Blas Lester MD                          
                           Acct: [id number]  Dis Date:               Status: 
REG ER                                  PHONE #: [phone]     Exam Date: 
2020                     FAX #: [phone]      Reason: Neck Pain 
                                         EXAMS:                                 
             CPT CODE:      [id number] CT C-SPINE W/O CONTRAST                   
[address]                    EXAM:  - CT C-SPINE W/O CONTRAST               HISTORY:
Neck pain, fall.               TECHNIQUE: Axial tomograms through the cervical 
spine were obtained       without intravenous contrast. Sagittal and coronal 
reformatted images       are provided.       This exam was performed according 
to our departmental       dose-optimization program, which includes automated 
exposure control,       adjustment of the mA and/or kV according to patient size
and/or use of       iterative reconstruction technique.               
COMPARISON: 2017.               FINDINGS:               Vertebral 
heights and alignment are maintained. No acute fracture or       subluxation. 
The prevertebral soft tissues are within normal limits.       The visualized 
soft tissues of the neck show no significant       abnormalities. Degenerative 
disc disease and facet arthropathy is       present at multiple levels.         
       IMPRESSION:                   No acute osseous abnormality with other 
findings as above.           ** Electronically Signed by Armani Medel MD on 
2020 at 2220 **                      Reported and signed by: BRITTANIE Medel MD              CC: Jayson Caal; Blas Lester MD                     
                                                                                
Technologist:RT ROBBIN(R)      CT    CTDI:        DLP:        Trnscb D
ate/Time: 2020 () t.SDR.MKM4                       Orig Print D/T: S: 
2020 (2062)      PAGE  1                       Signed Report              
                - XR PELVIS 1/2 [id number] 22:11:00 FAX: Jayson Salazar [phone]    [place]:    St: REG FAX:         
Blas Lester MD                      ----------------------------------------
---------------------------------------  Name:   THERONSHANNA NOYOLA [facility]                     : 1970  Age/S: 49/M           [address] 
[facility]                Unit #: [id number]      Loc: [place]  [address]              Phys: Blas Lester MD                                     
                Acct: [id number] Dis Date:               Status: REG ER       
                         PHONE #: [phone]     Exam Date:     2020                   FAX #: [phone]     Reason: PELVIC PAIN              
                         EXAMS:                                               
CPT CODE:      [id number] XR PELVIS 1/2 VIEWS                        85951       
            EXAM:  - XR PELVIS 1/2 VIEWS               HISTORY: Pain. Fall.     
         COMPARISON: 2017..               FINDINGS:       Single AP
view of the pelvis is provided.       No acute fracture, dislocation, or other 
acute osseous abnormality is       demonstrated.       The femoral heads are 
located.                 IMPRESSION:                   No fracture or other ac
[place] osseous abnormality identified.          ** Electronically Signed by Armani Medel MD on 2020 at 2211 **                      Reported and signed by:
Armani Medel MD                        CC: Jayson Caal; Blas Lester MD  
                                                                                
                   Technologist: RANDELL RILEY; RT Kalyn(R  
       Trnscrd Date/Time/By: 2020 () : By: [email or web address]          Orig 
Print D/T: S: 2020 (2745)                         PAGE  1                 
     Signed Report                               - XR CHEST 1 -15-42 
22:09:00 FAX: Jayson Salazar [phone]    [place]:    St: REG 
FAX:         Blas Lester MD                      
------------------------------------------------------------------------------- 
Name:   THERONSHANNA EMANUELWENDI VARGAS           [facility]                     :
1970  Age/S: 49/M           [address] [facility]                Unit #: 
[id number]      Loc: [place]  [address]              Phys: 
Blas Lester MD                                                      Acct: 
[id number] Dis Date:               Status: REG ER                             
   PHONE #: [phone]     Exam Date:     2020                 
 FAX #: [phone]     Reason: CHEST PAIN                                     
   EXAMS:                                               CPT CODE:      [id number]
XR CHEST 1 V                               [id number]                    EXAM:  - XR 
CHEST 1 V               HISTORY: Chest pain.               COMPARISON: 2019.               FINDINGS:               Single AP view of the chest is 
provided.       Heart size and vascularity are within normal limits.        The 
lungs are clear of focal consolidation. No effusion,        pneumothorax, or 
acute osseous abnormality.                         IMPRESSION:         No 
radiographic evidence of acute cardiopulmonary process.          ** 
Electronically Signed by Armani Medel MD on 2020 at 2209 **             
        Reported and signed by: Armani Medel MD                       CC: 
Jayson Caal; Blas Lester MD                                               
                                                       Technologist: RANDELL AKBAR; Satinder Barton RT(R          Trnscrd Date/Time/By: 2020 (2
209) : By: [email or web address]          Orig Print D/T: S: 2020 ()             
           PAGE  1                       Signed Report                          
    - CT HEAD/BRAIN W/O [id number] 22:07:00  Name: SHANNA CRUMP [facility]                     : 1970 Age/S: 49  / M       
 [address]                Unit #: [id number]     Loc:               
[place]  [address]              Phys: Blas Lester MD                          
                           Acct: [id number]  Dis Date:               Status: 
REG ER                                  PHONE #: [phone]     Exam Date: 
2020                     FAX #: [phone]      Reason: HEADACHE  
                                         EXAMS:                                 
             CPT CODE:      [id number] CT HEAD/BRAIN W/O CONT                    
[address]                    EXAM:  - CT HEAD/BRAIN W/O CONT               HISTORY: 
Fall.               TECHNIQUE: Axial tomograms through the brain were obtained 
without       intravenous contrast.       This exam was performed according to 
our departmental       dose-optimization program, which includes automated 
exposure control,       adjustment of the mA and/or kV according to patient size
and/or use of       iterative reconstruction technique.               FINDINGS: 
             There is no intracranial hemorrhage, mass, or mass effect. The     
 ventricular system and sulci are age-appropriate. There is no evidence       of
acute infarction.               The osseous structures and orbits, show no 
significant abnormalities.        The visualized sinuses are relatively clear.  
     The soft tissues are unremarkable.                 IMPRESSION:             
     No acute intracranial abnormality with no evidence of intracranial         
hemorrhage.                    ** Electronically Signed by Armani Medel MD on 
2020 at 2207 **                      Reported and signed by: 
Armani Medel MD             CC: Jayson Caal; Blas Lester MD             
                                                                                
        Technologist:SHANNA MCNEAL, RT(R)      CT    CTDI:        DLP:        
Trnscb Date/Time: 2020 () [email or web address]                       Orig Print 
D/T: S: 2020 ()      PAGE  1                       Signed Report      
                        XR Hip 2-3 View [id number] 17:16:28Hm Interface, 
Radiology Results Incoming - 2020  5:19 PM CSTEXAMINATION:  XR HIP 2-3 
VIEWS LEFTCLINICAL HISTORY:  s p fallTECHNIQUE:   3  views of left hip  
obtained.COMPARISON:  None.IMPRESSION:Mild to moderate OA. An osseous bump at 
the femoral head neck junction; clinical evidence of femoral acetabular impinge
ment. No acute fracture or dislocation identified. [facility]-[id number] 
[facility]istCT Lumbar Spine Wo [id number] 16:17:50Hm Interface, Radiology 
Results 2020  4:20 PM CSTEXAMINATION:  CT LUMBAR SPINE WO 
CONTRASTCLINICAL HISTORY:  s p fall, COMPARISON:  None.Technique: Multiple axial
CT images of the lumbar spine are obtained without the use of intravenous 
contrast. Coronal and sagittal 3-D reconstructions are obtained.CT scans are 
performed using radiation dose reduction techniques.  Technical factors are 
evaluated and adjusted to ensure appropriate moderation of exposure.  Automated 
dose management technology is applied to adjust radiation exposure while 
achieving a diagnostic quality image.FINDINGS:The visualized portions of the ret
roperitoneum are unremarkable. The abdominal aorta has no aneurysmal dilatation.
There is no free fluid seen within the pelvis.The vertebral bodies maintain nor
mal height and alignment. There is no acute fracture or subluxation. Degenerativ
e changes are present within the cervical spine. There are small osteophytes see
n posteriorly at level L4-L5. There is minimal annular bulging seen at level L4-
L5. There is no narrowing of the spinal canal.The SI joints are unremarkable. Th
e sacrum demonstrates no abnormality.IMPRESSION:1. There is no acute fracture or
subluxation.2. There are minimal degenerative changes present consistent with o
steoarthritis.3. There are no osseous lesions present.Abdi Palmer
[id number] 11:38:00
--------------------------------------------------------------------------------
------------RUN DATE: 19                         [facility]           
              PAGE 1   RUN TIME: 1139                            Specimen Inqui
ry                    RUN USER: INTERFACE                                       
                   ------------------------------------------------------------
--------------------------------PATIENT: SHANNA CRUMP JR        ACCT #: [id number]
[id number] LOC:  GALILEA      U #: [id number]                                    
  AGE/SX: 49/M         ROOM:            RE19REG DR:  Jose De Leon MD   
           :    [birth year]     BED:             DIS:                           
                    STATUS: [facility]      TLOC:           --------------------
------------------------------------------------------------------------ SPEC #:
[id number]     RECD:      STATUS:  CHIKI           [facility] #: [id number]
[id number]                           MARK: 19-         SUBM DR: Jose De Leon MD  
             ENTERED:      SP TYPE: MIKE SANTOS DR: Jayson Seo od, DO         ORDERED:  GROSS                                         
                                    COPIES TO:   Jayson Caal DO   [address]   [place], [place] [id number] [phone]    Jose De Leon MD   [address]   [place] [address]   [phone] MARKERS: ABNORMAL TISSUE, KIMBERLY
R PROCEDURES: GROSS () TISSUES:           GALLBLADDER, NOS         
CLINICAL HISTORY    COLLECTION DATE: 19       CHOLECYSTITIS, CHOLELITHIASIS 
       FINAL DIAGNOSIS    Gallbladder, cholecystectomy:        CHRONIC CHOLECYS
TITIS        CHOLELITHIASIS        NEGATIVE FOR MALIGNANCY           FA/sm   A  
[address]           MACROSCOPIC    The specimen is received in formalin labeled with
the patient's name,   "gallbladder" and consists of an intact gallbladder with a
smooth green serosal   surface.  The specimen measures 7 cm in length with di
ameter up to 2.5 cm.  A    1.3 cm segment of duct is clamped.  No lymph node is 
identified.  The lumen                                ** CONTINUED ON NEXT PAGE 
** -----------------------------------------------------------------------------
---------------RUN DATE: 19                         [facility]        
                 PAGE 2   RUN TIME: 1139                            Specimen In
quiry                    RUN USER: INTERFACE                                    
                      ---------------------------------------------------------
-----------------------------------SPEC #: [id number]    PATIENT: KAROLINE CRUMP JR         #[id number]  (Continued)---------------------------------
-----------------------------------------------------------           MACROSCOPI
C             (Continued)    contains viscous green bile with several black pigm
ent-type stones ranging from   0.1 to 0.3 cm in diameter.  The mucosal surface i
s dark green and velvety.  The   gallbladder wall measures up to 0.2 cm in thick
ness.  No nodules or masses are   identified.  Representative tissue is submitte
d in a single cassette.       GROSS PERFORMED AT [facility]   [address] [place] [address], [place] [address] [phone]           MICROSCOPIC    All of the stains, including any contr
ols performed, stain   appropriately.       MICROSCOPIC PERFORMED AT [facility]   [address] [facility] 
[address] [phone]         PERFORMING SITE    Diagnosis performed at:      
 [facility]     
  [address] [facility], [place] [address] [phone]---------
--------------------------------------------------------------------------------
--- Signed SIGNATURE ON FILE                        Shahnaz Garcia MD 19 11
38     -------------------------------------------------------------------------
-------------------                                                 ** END OF RE
PORT ** [id number] 14:10:00* 



             Test Item    Value        Reference Range Interpretation Comments

 

             GLUBED (test code = GLUBED) 143 mg/dL           H            

Performed by certified  

at [facility]





COMPREHENSIVE METABOLIC [id number] 15:17:00* 



             Test Item    Value        Reference Range Interpretation Comments

 

             SODIUM (test code = NA) 141 mmol/L   136-145      N             

 

             POTASSIUM (test code = K) 4.1 mmol/L   3.5-5.1      N             

 

             CHLORIDE (test code = CL) 107.0 mmol/L        N             

 

             CARBON DIOXIDE (test code = CO2) 26.0 mmol/L  21-32        N       

      

 

             ANION GAP (test code = GAP) 12.1         10-20        N            

 

 

             GLUCOSE (test code = GLU) 284 mg/dL           H             

 

             BLOOD UREA NITROGEN (test code = BUN) 8 mg/dL      7-18         N  

           

 

             GLOMERULAR FILTRATION RATE (test code = GFR) > 60 mL/min  >=60     

                 Estimated GFR by

using Modified MDRD formula.Chronic kidney disease is defined as either kidney 
damageor GFR <60 mL/min/1.73 m2 for >3 months.

 

             CREATININE (test code = CREAT) 0.80 mg/dL   0.7-1.3      N         

    

 

             BUN/CREATININE RATIO (test code = BUN/CREA) 10.0         10-20     

   N             

 

             TOTAL PROTEIN (test code = PROT) 7.2 gram/dL  6.4-8.2      N       

      

 

             ALBUMIN (test code = ALB) 3.7 g/dL     3.4-5.0      N             

 

             GLOBULIN (test code = GLOB) 3.5 gram/dL  2.7-4.2      N            

 

 

             ALBUMIN/GLOBULIN RATIO (test code = A/G) 1.1          0.75-1.50    

N             

 

             CALCIUM (test code = CA) 9.3 mg/dL    8.5-10.1     N             

 

             BILIRUBIN TOTAL (test code = BILT) 0.20 mg/dL   0.0-1.0      N     

        

 

             SGOT/AST (test code = AST) 17 IUnit/L   15-37        N             

 

             SGPT/ALT (test code = ALT) 32 IUnit/L   12-78        N             

 

             ALKALINE PHOSPHATASE TOTAL (test code = ALKP) 89 IUnit/L     

     N            **Note change 

in reference range due to change in reagent.**





COMPREHENSIVE METABOLIC [id number] 15:03:00* 



             Test Item    Value        Reference Range Interpretation Comments

 

             SODIUM (test code = NA) 141 mmol/L   136-145      N             

 

             POTASSIUM (test code = K) 4.1 mmol/L   3.5-5.1      N             

 

             CHLORIDE (test code = CL) 107.0 mmol/L        N             

 

             CARBON DIOXIDE (test code = CO2)  mmol/L      21-32                

      

 

             ANION GAP (test code = GAP)              10-20                     

 

 

             GLUCOSE (test code = GLU)  mg/dL                            

 

             BLOOD UREA NITROGEN (test code = BUN)  mg/dL       7-18            

           

 

             GLOMERULAR FILTRATION RATE (test code = GFR)  mL/min      >=60     

                  

 

             CREATININE (test code = CREAT)  mg/dL       0.7-1.3                

    

 

             BUN/CREATININE RATIO (test code = BUN/CREA)              10-20     

                 

 

             TOTAL PROTEIN (test code = PROT)  gram/dL     6.4-8.2              

      

 

             ALBUMIN (test code = ALB)  g/dL        3.4-5.0                    

 

             GLOBULIN (test code = GLOB)  gram/dL     2.7-4.2                   

 

 

             ALBUMIN/GLOBULIN RATIO (test code = A/G)              0.75-1.50    

              

 

             CALCIUM (test code = CA)  mg/dL       8.5-10.1                   

 

             BILIRUBIN TOTAL (test code = BILT)  mg/dL       0.0-1.0            

        

 

             SGOT/AST (test code = AST)  IUnit/L     15-37                      

 

             SGPT/ALT (test code = ALT)  IUnit/L     12-78                      

 

             ALKALINE PHOSPHATASE TOTAL (test code = ALKP)  IUnit/L       

                   





CBC W/AUTO [id number] 14:29:00* 



             Test Item    Value        Reference Range Interpretation Comments

 

             WHITE BLOOD CELL (test code = WBC) 5.2 K/mm3    4.5-12.5     N     

        

 

             RED BLOOD CELL (test code = RBC) 4.50 mill/mm3 4.0-5.8      N      

       

 

             HEMOGLOBIN (test code = HGB) 11.3 gram/dL 13.0-17.5    L           

  

 

             HEMATOCRIT (test code = HCT) 37.1 %       42.0-52.0    L           

  

 

             MEAN CELL VOLUME (test code = MCV) 82.4 fL      80-98        N     

        

 

             MEAN CELL HGB (test code = MCH) 25.1 picogram 27.0-33.0    L       

      

 

             MEAN CELL HGB CONCETRATION (test code = MCHC) 30.5 gram/dL 33.0-36.

0    L             

 

             RED CELL DISTRIBUTION WIDTH (test code = RDW) 15.2 %       11.6-16.

2    N             

 

             RED CELL DISTRIBUTION WIDTH SD (test code = RDW-SD) 45.9 fL      37

.0-51.0    N             

 

             PLATELET COUNT (test code = PLT) 189 K/mm3    150-450      N       

      

 

             MEAN PLATELET VOLUME (test code = MPV) 11.0 fL      6.7-11.0     N 

            

 

             NEUTROPHIL % (test code = NT%) 59.0 %       39.0-69.0    N         

    

 

             IMMATURE GRANULOCYTE % (test code = IG%) 0.4 %        0.0-5.0      

N             

 

             LYMPHOCYTE % (test code = LY%) 31.5 %       25.0-55.0    N         

    

 

             MONOCYTE % (test code = MO%) 6.4 %        0.0-10.0     N           

  

 

             EOSINOPHIL % (test code = EO%) 2.3 %        0.0-5.0      N         

    

 

             BASOPHIL % (test code = BA%) 0.4 %        0.0-1.0      N           

  

 

             NUCLEATED RBC % (test code = NRBC%) 0.0 %        0-0          N    

         

 

             NEUTROPHIL # (test code = NT#) 3.04 K/mm3   1.8-7.7      N         

    

 

             IMMATURE GRANULOCYTE # (test code = IG#) 0.02 x10 3/uL 0-0.03      

 N             

 

             LYMPHOCYTE # (test code = LY#) 1.62 K/mm3   1.0-5.0      N         

    

 

             MONOCYTE # (test code = MO#) 0.33 K/mm3   0-0.8        N           

  

 

             EOSINOPHIL # (test code = EO#) 0.12 K/mm3   0.0-0.5      N         

    

 

             BASOPHIL # (test code = BA#) 0.02 K/mm3   0.0-0.2      N           

  

 

             NUCLEATED RBC # (test code = NRBC#) 0.00 K/mm3   0.0-0.1      N    

         





GASTRIC,[id number] 11:26:00
--------------------------------------------------------------------------------
------------RUN DATE: 19                         [facility]           
              PAGE 1   RUN TIME: 1126                            Specimen Inqui
ry                    RUN USER: INTERFACE                                       
                   ------------------------------------------------------------
--------------------------------PATIENT: THERONSHANNA JR.       ACCT #: [id number]
[id number] LOC:  [place]      U #: [id number]                                    
  AGE/SX: 49/M         ROOM:            RE/15/19REG DR:  Pete Johnson MD        :    [birth year]     BED:             DIS:                           
                    STATUS: DEP SDC      TLOC:           --------------------
------------------------------------------------------------------------ SPEC #:
[id number]     RECD: 05/15/     STATUS:  CHIKI ZHENG #: [id number]
[id number]                           MARK: 05/15/     [facility] DR: Pete Johnson MD         ENTERED:  05/15/    SP TYPE: GASTRIC BX     OTHR DR: Jayson Seo od, DO         ORDERED:  GROSS                                         
                                    COPIES TO:   Pete Johnson MD   [address]   [place], [place] [address] [phone]    Jayson Caal DO   [address]   [place], [place] [address] [phone] PROCEDURES: GROSS (19-) TISSUES:           1. GASTRIC CORPUS - POUCH BX         2. G/E JUNCTION - BX
        CLINICAL HISTORY    COLLECTION DATE: 5/15/19       PERSISTENT NAUSEA, D
YSPHAGIA, ABDOMINAL PAIN         FINAL DIAGNOSIS    Gastric pouch biopsy:       
    GASTRIC MUCOSA, NO PATHOLOGIC ALTERATION        NEGATIVE FOR HELICOBACTER P
YLORI       G.E.- Junction, biopsy:        SQUAMOUS ESOPHAGEAL MUCOSA WITH REACT
ANA EPITHELIAL CHANGES          INCLUDING AN INCREASE IN THE HEIGHT OF THE PAPIL
LAE        NEGATIVE FOR METAPLASIA, DYSPLASIA, AND MALIGNANCY           DMW/sm  
D   [phone]                                   ** CONTINUED ON NEXT PAGE
** ---------------------------------------------------------------------------
-----------------RUN DATE: 19                         [facility]      
                   PAGE 2   RUN TIME: 1126                            Specimen 
Inquiry                    RUN USER: INTERFACE                                  
                        -------------------------------------------------------
-------------------------------------SPEC #: [id number]    PATIENT: JANESSA CRUMP JR.        #[id number]  (Continued)-------------------------------
-------------------------------------------------------------            MACROSC
OPIC    The first specimen is received in formalin, labeled with the patient's n
josé luis,   and identified as "gastric pouch bx".  It consists of tan biopsy tissue  
measuring 0.6 cm in length.  An H E and a Giemsa stain will be prepared.       
The second specimen is received in formalin, labeled with the patient's name,   
identified as "g.e.-junction bx".  It consists of tan biopsy tissue measuring   
0.25 cm.            GROSS PERFORMED AT [facility]   [address] [place] [address]   [phone]
[phone]           MICROSCOPIC    All of the stains, including any controls perfor
med, stain   appropriately.       MICROSCOPIC PERFORMED AT [facility]   [address] [place] [address]   [phone]
[phone]         PERFORMING SITE    Diagnosis performed at:        [facility]        [address] 
[facility], [place] [address] [phone]-------------------
------------------------------------------------------------------------- Signed
SIGNATURE ON FILE                        Aditi Max MD 19 1126 ---
--------------------------------------------------------------------------------
---------                                            ** END OF REPORT ** GLUBED
2019-05-15 06:22:00* 



             Test Item    Value        Reference Range Interpretation Comments

 

             GLUBED (test code = GLUBED) 107 mg/dL           H            

Performed by certified  

at [facility]





BASIC METABOLIC [id number] 14:50:00* 



             Test Item    Value        Reference Range Interpretation Comments

 

             SODIUM (test code = NA) 139 mmol/L   136-145      N             

 

             POTASSIUM (test code = K) 4.1 mmol/L   3.5-5.1      N             

 

             CHLORIDE (test code = CL) 103.0 mmol/L        N             

 

             CARBON DIOXIDE (test code = CO2) 31.0 mmol/L  21-32        N       

      

 

             ANION GAP (test code = GAP) 9.1          10-20        L            

 

 

             GLUCOSE (test code = GLU) 99 mg/dL            N             

 

             BLOOD UREA NITROGEN (test code = BUN) 11 mg/dL     7-18         N  

           

 

             GLOMERULAR FILTRATION RATE (test code = GFR) > 60 mL/min  >=60     

                 Estimated GFR by

using Modified MDRD formula.Chronic kidney disease is defined as either kidney 
damageor GFR <60 mL/min/1.73 m2 for >3 months.

 

             CREATININE (test code = CREAT) 0.80 mg/dL   0.7-1.3      N         

    

 

             BUN/CREATININE RATIO (test code = BUN/CREA) 13.8         10-20     

   N             

 

             CALCIUM (test code = CA) 9.3 mg/dL    8.5-10.1     N             





BASIC METABOLIC [id number] 14:40:00* 



             Test Item    Value        Reference Range Interpretation Comments

 

             SODIUM (test code = NA) 139 mmol/L   136-145      N             

 

             POTASSIUM (test code = K) 4.1 mmol/L   3.5-5.1      N             

 

             CHLORIDE (test code = CL) 103.0 mmol/L        N             

 

             CARBON DIOXIDE (test code = CO2)  mmol/L      21-32                

      

 

             ANION GAP (test code = GAP)              10-20                     

 

 

             GLUCOSE (test code = GLU)  mg/dL                            

 

             BLOOD UREA NITROGEN (test code = BUN)  mg/dL       7-18            

           

 

             GLOMERULAR FILTRATION RATE (test code = GFR)  mL/min      >=60     

                  

 

             CREATININE (test code = CREAT)  mg/dL       0.7-1.3                

    

 

             BUN/CREATININE RATIO (test code = BUN/CREA)              10-20     

                 

 

             CALCIUM (test code = CA)  mg/dL       8.5-10.1                   





CBC W/AUTO [id number] 13:08:00* 



             Test Item    Value        Reference Range Interpretation Comments

 

             WHITE BLOOD CELL (test code = WBC) 5.7 K/mm3    4.5-12.5     N     

        

 

             RED BLOOD CELL (test code = RBC) 4.72 mill/mm3 4.0-5.8      N      

       

 

             HEMOGLOBIN (test code = HGB) 11.9 gram/dL 13.0-17.5    L           

  

 

             HEMATOCRIT (test code = HCT) 38.4 %       42.0-52.0    L           

  

 

             MEAN CELL VOLUME (test code = MCV) 81.4 fL      80-98        N     

        

 

             MEAN CELL HGB (test code = MCH) 25.2 picogram 27.0-33.0    L       

      

 

             MEAN CELL HGB CONCETRATION (test code = MCHC) 31.0 gram/dL 33.0-36.

0    L             

 

             RED CELL DISTRIBUTION WIDTH (test code = RDW) 14.4 %       11.6-16.

2    N             

 

             RED CELL DISTRIBUTION WIDTH SD (test code = RDW-SD) 42.1 fL      37

.0-51.0    N             

 

             PLATELET COUNT (test code = PLT) 250 K/mm3    150-450      N       

      

 

             MEAN PLATELET VOLUME (test code = MPV) 10.7 fL      6.7-11.0     N 

            

 

             NEUTROPHIL % (test code = NT%) 41.9 %       39.0-69.0    N         

    

 

             IMMATURE GRANULOCYTE % (test code = IG%) 0.3 %        0.0-5.0      

N             

 

             LYMPHOCYTE % (test code = LY%) 47.7 %       25.0-55.0    N         

    

 

             MONOCYTE % (test code = MO%) 7.1 %        0.0-10.0     N           

  

 

             EOSINOPHIL % (test code = EO%) 2.3 %        0.0-5.0      N         

    

 

             BASOPHIL % (test code = BA%) 0.7 %        0.0-1.0      N           

  

 

             NUCLEATED RBC % (test code = NRBC%) 0.0 %        0-0          N    

         

 

             NEUTROPHIL # (test code = NT#) 2.40 K/mm3   1.8-7.7      N         

    

 

             IMMATURE GRANULOCYTE # (test code = IG#) 0.02 x10 3/uL 0-0.03      

 N             

 

             LYMPHOCYTE # (test code = LY#) 2.74 K/mm3   1.0-5.0      N         

    

 

             MONOCYTE # (test code = MO#) 0.41 K/mm3   0-0.8        N           

  

 

             EOSINOPHIL # (test code = EO#) 0.13 K/mm3   0.0-0.5      N         

    

 

             BASOPHIL # (test code = BA#) 0.04 K/mm3   0.0-0.2      N           

  

 

             NUCLEATED RBC # (test code = NRBC#) 0.00 K/mm3   0.0-0.1      N    

         

 

             MANUAL DIFF REQUIRED (test code = MDIFF) NO                        

              





- HEPA IMAG INCL GB W [id number] 12:28:00 FAX: Angel Ford MD        
              [place]: B   St: ADM FAX:         Pete Johnson   [phone]
   FAX: Jayson Salazar [phone]   
------------------------------------------------------------------------------- 
Name:   SHANNA CRUMP                     [facility]                     :
1970  Age/S: 48/M           [address] [facility]                Unit #: 
[id number]      Loc: [address]       [place]  [address]              Phys: 
Pete Johnson MD                                              Acct: [id number]
[id number] Dis Date:               Status: ADM IN                                 
ONE #: [phone]     Exam Date:     2019     1209                   FAX
#: [phone]     Reason: ABDOMINAL PAIN FOR A MONTH                         
EXAMS:                                               CPT CODE:      [id number] 
PA IMAG INCL GB W PHA                    [id number]                    HISTORY: Abdom
inal pain.               COMPARISON: Ultrasound from previous day.              
HIDA scan: 5.5 mCi of technetium 99m Choletec and 2 mcg of CCK.       Sequential
images obtained.               Homogeneous uptake within the liver. Excretion 
into the biliary system       as well as the gallbladder and small bowel. Ejecti
on fraction       calculated to 12% at 30 minutes. The normal should be greater 
than       35%.                 IMPRESSION:                   Depressed ejection
fraction of 12% at 30 minutes may suggest chronic         gallbladder dysmotili
ty and/or biliary dyskinesia.          ** Electronically Signed by RUSLAN dietrich on 2019 at 1228 **                      Reported and signed by: Wu Grande M.D.                      CC: Angel Gandhi MD; Pete Johnson MD; Jayson Zapata                                                                
              Technologist: ALBA SCHERER                                      
  Trnscrd Date/Time/By: 2019 (1228) : By: [email or web address]           Orig Print 
D/T: S: 2019 (8255)                         PAGE  1                       
Signed Report                               [id number] 12:24:00* 



             Test Item    Value        Reference Range Interpretation Comments

 

             GLUBED (test code = GLUBED) 216 mg/dL           H            

Performed by certified  

at [facility]





BASIC METABOLIC [id number] 07:59:00* 



             Test Item    Value        Reference Range Interpretation Comments

 

             SODIUM (test code = NA) 141 mmol/L   136-145      N             

 

             POTASSIUM (test code = K) 3.8 mmol/L   3.5-5.1      N             

 

             CHLORIDE (test code = CL) 107.0 mmol/L        N             

 

             CARBON DIOXIDE (test code = CO2) 30.0 mmol/L  21-32        N       

      

 

             ANION GAP (test code = GAP) 7.8          10-20        L            

 

 

             GLUCOSE (test code = GLU) 149 mg/dL           H             

 

             BLOOD UREA NITROGEN (test code = BUN) 7 mg/dL      7-18         N  

           

 

             GLOMERULAR FILTRATION RATE (test code = GFR) > 60 mL/min  >=60     

                 Estimated GFR by

using Modified MDRD formula.Chronic kidney disease is defined as either kidney 
damageor GFR <60 mL/min/1.73 m2 for >3 months.

 

             CREATININE (test code = CREAT) 0.80 mg/dL   0.7-1.3      N         

    

 

             BUN/CREATININE RATIO (test code = BUN/CREA) 9.0          10-20     

   L             

 

             CALCIUM (test code = CA) 8.8 mg/dL    8.5-10.1     N             





BASIC METABOLIC [id number] 07:58:00* 



             Test Item    Value        Reference Range Interpretation Comments

 

             SODIUM (test code = NA) 141 mmol/L   136-145      N             

 

             POTASSIUM (test code = K) 3.8 mmol/L   3.5-5.1      N             

 

             CHLORIDE (test code = CL) 107.0 mmol/L        N             

 

             CARBON DIOXIDE (test code = CO2)  mmol/L      21-32                

      

 

             ANION GAP (test code = GAP)              10-20                     

 

 

             GLUCOSE (test code = GLU)  mg/dL                            

 

             BLOOD UREA NITROGEN (test code = BUN)  mg/dL       7-18            

           

 

             GLOMERULAR FILTRATION RATE (test code = GFR)  mL/min      >=60     

                  

 

             CREATININE (test code = CREAT)  mg/dL       0.7-1.3                

    

 

             BUN/CREATININE RATIO (test code = BUN/CREA)              10-20     

                 

 

             CALCIUM (test code = CA)  mg/dL       8.5-10.1                   





[id number] 06:05:00* 



             Test Item    Value        Reference Range Interpretation Comments

 

             GLUBED (test code = GLUBED) 160 mg/dL           H            

Performed by certified  

at [facility]





[id number] 04:12:00* 



             Test Item    Value        Reference Range Interpretation Comments

 

             GLUBED (test code = GLUBED) 226 mg/dL           H            

Performed by certified  

at [facility]





[id number] 20:20:00* 



             Test Item    Value        Reference Range Interpretation Comments

 

             GLUBED (test code = GLUBED) 205 mg/dL           H            

Performed by certified  

at [facility]





- US ABDOMEN [id number] 19:33:00  Name: SHANNA CRUMP                      
[facility]                     : 1970 Age/S: 48  / M         [address] 
[facility]                Unit #: [id number]     Loc:               [place]  [address]              Phys: Pete Johnson MD                             
                Acct: [id number]  Dis Date:               Status: ADM IN      
                           PHONE #: [phone]     Exam Date: 2019  1854
                    FAX #: [phone]      Reason: ABDOMINAL PAIN             
                        EXAMS:                                               CPT
CODE:      [id number] US ABDOMEN LTD                             [address]           
        EXAM: Ultrasound abdomen, limited;               INFORMATION: Abdominal 
pain;               FINDINGS:       Liver is is of normal size and shape. It 
shows diffusely increased       echogenicity; no focal lesions.       The 
gallbladder is of normal diameter and wall thickness; a stone is       seen in 
the neck of the gallbladder and sludge is also seen within the       
gallbladder.       No dilatation of intra or extrahepatic bile ducts.       The 
pancreas is unremarkable.       The right kidney is of normal size and shape; no
hydronephrosis, no       stones and no parenchymal abnormalities.       The 
right kidney measures 10.4 x 6 x 4.8 cm.       Imaged portions of the IVC and 
abdominal aorta are unremarkable.                 IMPRESSION:         1. 
Cholecystolithiasis and sludge within the gallbladder.         2. Fatty liver.  
                ** Electronically Signed by RUSLAN Falk **           **
            on 2019 at 1933             **                      Reported 
and signed by: Hardeep Falk M.D.                   CC: Angel Gandhi MD; 
Pete Johnson MD; Jayson Caal                                       
                                       Technologist: Trish King RDMS  
                           [facility] Date/Time: 2019 () Katherine        
               Orig Print D/T: S: 2019 ()     Probe:                  
    PAGE  1                       Signed Report                               
HEPATIC FUNCTION [id number] 18:42:00* 



             Test Item    Value        Reference Range Interpretation Comments

 

             TOTAL PROTEIN (test code = PROT) 6.8 gram/dL  6.4-8.2      N       

      

 

             ALBUMIN (test code = ALB) 3.7 g/dL     3.4-5.0      N             

 

             GLOBULIN (test code = GLOB) 3.1 gram/dL  2.7-4.2      N            

 

 

             ALBUMIN/GLOBULIN RATIO (test code = A/G) 1.2          0.75-1.50    

N             

 

             BILIRUBIN TOTAL (test code = BILT) 0.30 mg/dL   0.0-1.0      N     

        

 

             BILIRUBIN DIRECT (test code = BILD) 0.08 mg/dL   0.0-0.20     N    

         

 

             SGOT/AST (test code = AST) 19 IUnit/L   15-37        N             

 

             SGPT/ALT (test code = ALT) 23 IUnit/L   12-78        N             

 

             ALKALINE PHOSPHATASE TOTAL (test code = ALKP) 82 IUnit/L     

     N            **Note change 

in reference range due to change in reagent.**





[id number] 17:16:00* 



             Test Item    Value        Reference Range Interpretation Comments

 

             GLUBED (test code = GLUBED) 234 mg/dL           H            

Performed by certified  

at [facility]





[id number] 12:01:00* 



             Test Item    Value        Reference Range Interpretation Comments

 

             GLUBED (test code = GLUBED) 258 mg/dL           H            

Performed by certified  

at [facility]





- XR [id number] 10:53:00 FAX: Angel Ford MD                   
   [place]:    St: [facility] FAX: Jayson Salazar [phone]   
------------------------------------------------------------------------------- 
Name:   CRUMPSHANNA                     [facility]                     :
1970  Age/S: 48/M           [address] [facility]                Unit #: 
[id number]      Loc: [address]       [place]  [address]              Phys: 
Angel Gandhi MD                                                      Acct: 
[id number] Dis Date:               Status: ADM IN                             
   PHONE #: [phone]     Exam Date:     2019     1019                 
 FAX #: [phone]     Reason: Hx Francie-en-Y bypass. Abd pain. Eval per surgeon
   EXAMS:                                               CPT CODE:      [id number]
XR ESOPHAGUS                               87525                    HISTORY: 
Difficulty swallowing.               COMPARISON: None available.               
Single contrast esophagram.               Esophagus is distended well with free 
passage of contrast. No       obstruction is noted. No aspiration was visible 
during the study. The       peristalsis was normal. There is free passage 
through the GE junction       into the Francie-en-Y.                 IMPRESSION:   
               No esophageal obstruction or constriction with free passage of   
     contrast in its entirety with normal peristalsis.                   
Fluoroscopy time utilized was 0.4 minutes and 17 images were obtained         
during the study.          ** Electronically Signed by RUSLAN Grande on 
2019 at 1053 **                      Reported and signed by: Wu Grande M.D.                     CC: Angel Gandhi MD; Jayson Caal                  
                                                                                
   Technologist: Daniela Marvin RT(R)                                   Trnscrd 
Date/Time/By: 2019 (1050) : By: [email or web address]           Orig Print D/T: S: 
2019 (6371)                         PAGE  1                       Signed 
Report                               [id number] 05:14:00* 



             Test Item    Value        Reference Range Interpretation Comments

 

             GLUBED (test code = GLUBED) 196 mg/dL           H            

Performed by certified  

at [facility]





URINALYSIS [id number] 00:38:00* 



             Test Item    Value        Reference Range Interpretation Comments

 

             UA COLOR (test code = COLU) LIGHT YELLOW YELLOW                    

 

 

             UA APPEARANCE (test code = APPU) CLEAR        CLEAR                

      

 

             UA GLUCOSE DIPSTICK (test code = DGLUU) >=500 mg/dL  NEGATIVE     A

             

 

             UA BILIRUBIN DIPSTICK (test code = BILU) NEGATIVE mg/dL NEGATIVE   

                

 

             UA KETONE DIPSTICK (test code = KETU) 80 mg/dL     NEGATIVE     A  

           

 

             UA SPECIFIC GRAVITY (test code = SGU) 1.015        1.001-1.035     

           

 

             UA BLOOD DIPSTICK (test code = SHWETA) Negative     NEGATIVE          

         

 

             UA PH DIPSTICK (test code = KYM) 5.0          5.0-8.0              

      

 

             UA PROTEIN DIPSTICK (test code = PROU) Negative mg/dL NEGATIVE     

              

 

             UA UROBILINIOGEN DIPSTICK (test code = URO) NEGATIVE mg/dL NEGATIVE

                   

 

             UA NITRITE DIPSTICK (test code = RADHA) NEGATIVE     NEGATIVE       

            

 

             UA LEUKOCYTE ESTERASE W REFLEX (test code = LEUUR) NEGATIVE     NEG

ATIVE                   

 

             UA WBC (test code = WBCU) 0-5 #/HPF    0-5                        

 

             UA RBC (test code = RBCU) 0-2 #/HPF    0-5                        

 

             UA HYALINE CAST (test code = HYALU) 0-2 #/LPF    0-5               

         

 

             UA MUCUS (test code = MUCU) FEW #/LPF    FEW                       

 





Urine Source? Clean Catch- CT ABD PELVIS [id number] 00:38:00  Name: 
SHANNA CRUMP                      [facility]                     : 
1970 Age/S: 48  / M         [address]                Unit #: [id number]
[id number]     Loc:               [place]  [address]              Phys: WILLY Soto  DO                                                 Acct: [id number]  Di
s Date:               Status: REG ER                                  PHONE #: [phone]
[phone]     Exam Date: 2019                     FAX #: [phone]
[phone]      Reason: ABD pain, h/o Francie-en-Y bypass, r/o obstruction     EXAMS:     
                                         CPT CODE:      [id number] CT ABD PELVIS 
W/CONT                       [address]                            EXAM:  - CT ABD P
MELBA W/CONT               HISTORY: Abdominal pain.               TECHNIQUE: Axi
al tomograms through the abdomen and pelvis were       obtained after enteric an
d intravenous contrast.         Coronal and sagittal reformatted images are prov
ided.       This exam was performed according to our departmental       dose-opt
imization program, which includes automated exposure control,       adjustment o
f the mA and/or kV according to patient size and/or use of       iterative recon
struction technique.               COMPARISON: 2016.               FI
NDINGS:               The visualized lung bases are clear.       Previous gastri
c surgical changes are present.       There is no fluid collection or free intra
peritoneal air.               The liver, spleen, pancreas, adrenal glands and ki
dneys demonstrate no       significant abnormalities.       There is no evidence
of renal calculi or hydronephrosis.               The appendix is not identifie
d. The bowel is unremarkable.       The bowel is not distended.               Th
ere is no adenopathy or free fluid.               The osseous structures demonst
rate degenerative change without focal       lesion.        Degenerative disc di
sease at L4-L5 level of lumbar spine.                Abdominal aorta is normal i
n size.                         IMPRESSION:                    No significant ab
normalities demonstrated.                   PAGE  1                       Signed
Report                    (CONTINUED)   Name: SHANNA CRUMP                      
[facility]                     : 1970 Age/S: 48  / M         [address] 
[facility]                Unit #: [id number]     Loc:               [place]  [address]              Phys: Sobia Soto  DO                                
                Acct: [id number]  Dis Date:               Status: REG ER      
                           PHONE #: [phone]     Exam Date: 20195
                    FAX #: [phone]      Reason: ABD pain, h/o Francie-en-Y 
bypass, r/o obstruction     EXAMS:                                              
CPT CODE:      [id number] CT ABD PELVIS W/CONT                       [address]       
       <Continued>      ** Electronically Signed by Armani Medel MD on 
2019 at 0038 **                      Reported and signed by: 
Armani Medel MD                                       CC: Jayson Caal Lauren DO                                                             
                                    Technologist:MALISSA GRIER        CT       
CTDI:        DLP:        Trnscb Date/Time: 2019 (0038) [email or web address]         
             Orig Print D/T: S: 2019 (0041)       CTDI:          DLP:     
    PAGE  2                       Signed Report                               
URINALYSIS [id number] 00:24:00* 



             Test Item    Value        Reference Range Interpretation Comments

 

             UA COLOR (test code = COLU) LIGHT YELLOW YELLOW                    

 

 

             UA APPEARANCE (test code = APPU) CLEAR        CLEAR                

      

 

             UA BILIRUBIN DIPSTICK (test code = BILU) NEGATIVE mg/dL NEGATIVE   

                

 

             UA SPECIFIC GRAVITY (test code = SGU) 1.015        1.001-1.035     

           

 

             UA BLOOD DIPSTICK (test code = SHWETA) Negative     NEGATIVE          

         

 

             UA PH DIPSTICK (test code = KYM) 5.0          5.0-8.0              

      

 

             UA PROTEIN DIPSTICK (test code = PROU) Negative mg/dL NEGATIVE     

              

 

             UA UROBILINIOGEN DIPSTICK (test code = URO) NEGATIVE mg/dL NEGATIVE

                   

 

             UA NITRITE DIPSTICK (test code = RADHA) NEGATIVE     NEGATIVE       

            

 

             UA LEUKOCYTE ESTERASE W REFLEX (test code = LEUUR) NEGATIVE     NEG

ATIVE                   

 

             UA WBC (test code = WBCU)  per HPF     0-5                        





Urine Source? Clean CatchBASIC METABOLIC [id number] 23:35:00* 



             Test Item    Value        Reference Range Interpretation Comments

 

             SODIUM (test code = NA) 138 mmol/L   136-145      N             

 

             POTASSIUM (test code = K) 4.0 mmol/L   3.5-5.1      N             

 

             CHLORIDE (test code = CL) 105.0 mmol/L        N             

 

             CARBON DIOXIDE (test code = CO2) 24.0 mmol/L  21-32        N       

      

 

             ANION GAP (test code = GAP) 13.0         10-20        N            

 

 

             GLUCOSE (test code = GLU) 207 mg/dL           H             

 

             BLOOD UREA NITROGEN (test code = BUN) 9 mg/dL      7-18         N  

           

 

             GLOMERULAR FILTRATION RATE (test code = GFR) > 60 mL/min  >=60     

                 Estimated GFR by

using Modified MDRD formula.Chronic kidney disease is defined as either kidney 
damageor GFR <60 mL/min/1.73 m2 for >3 months.

 

             CREATININE (test code = CREAT) 0.80 mg/dL   0.7-1.3      N         

    

 

             BUN/CREATININE RATIO (test code = BUN/CREA) 11.7         10-20     

   N             

 

             CALCIUM (test code = CA) 8.9 mg/dL    8.5-10.1     N             





HEPATIC FUNCTION [id number] 23:35:00* 



             Test Item    Value        Reference Range Interpretation Comments

 

             TOTAL PROTEIN (test code = PROT) 7.5 gram/dL  6.4-8.2      N       

      

 

             ALBUMIN (test code = ALB) 4.2 g/dL     3.4-5.0      N             

 

             GLOBULIN (test code = GLOB) 3.3 gram/dL  2.7-4.2      N            

 

 

             ALBUMIN/GLOBULIN RATIO (test code = A/G) 1.3          0.75-1.50    

N             

 

             BILIRUBIN TOTAL (test code = BILT) 0.40 mg/dL   0.0-1.0      N     

        

 

             BILIRUBIN DIRECT (test code = BILD) 0.11 mg/dL   0.0-0.20     N    

         

 

             SGOT/AST (test code = AST) 25 IUnit/L   15-37        N             

 

             SGPT/ALT (test code = ALT) 26 IUnit/L   12-78        N             

 

             ALKALINE PHOSPHATASE TOTAL (test code = ALKP) 93 IUnit/L     

     N            **Note change 

in reference range due to change in reagent.**





[id number] 23:35:00* 



             Test Item    Value        Reference Range Interpretation Comments

 

             LIPASE (test code = LIP) 70 U/L       73.0-393.0   L             





[id number] 23:35:00* 



             Test Item    Value        Reference Range Interpretation Comments

 

             TROPONIN-I (test code = TROPI) <0.015 ng/mL 0-0.045      N         

    





LACTIC [id number] 23:21:00* 



             Test Item    Value        Reference Range Interpretation Comments

 

             LACTIC ACID (test code = LACT) 1.1 mmol/L   0.4-1.9      N         

    





BASIC METABOLIC [id number] 23:16:00* 



             Test Item    Value        Reference Range Interpretation Comments

 

             SODIUM (test code = NA) 138 mmol/L   136-145      N             

 

             POTASSIUM (test code = K) 4.0 mmol/L   3.5-5.1      N             

 

             CHLORIDE (test code = CL) 105.0 mmol/L        N             

 

             CARBON DIOXIDE (test code = CO2)  mmol/L      21-32                

      

 

             ANION GAP (test code = GAP)              10-20                     

 

 

             GLUCOSE (test code = GLU)  mg/dL                            

 

             BLOOD UREA NITROGEN (test code = BUN)  mg/dL       7-18            

           

 

             GLOMERULAR FILTRATION RATE (test code = GFR)  mL/min      >=60     

                  

 

             CREATININE (test code = CREAT)  mg/dL       0.7-1.3                

    

 

             BUN/CREATININE RATIO (test code = BUN/CREA)              10-20     

                 

 

             CALCIUM (test code = CA)  mg/dL       8.5-10.1                   





HEPATIC FUNCTION [id number] 23:16:00* 



             Test Item    Value        Reference Range Interpretation Comments

 

             TOTAL PROTEIN (test code = PROT)  gram/dL     6.4-8.2              

      

 

             ALBUMIN (test code = ALB)  g/dL        3.4-5.0                    

 

             GLOBULIN (test code = GLOB)  gram/dL     2.7-4.2                   

 

 

             ALBUMIN/GLOBULIN RATIO (test code = A/G)              0.75-1.50    

              

 

             BILIRUBIN TOTAL (test code = BILT)  mg/dL       0.0-1.0            

        

 

             BILIRUBIN DIRECT (test code = BILD)  mg/dL       0.0-0.20          

         

 

             SGOT/AST (test code = AST)  IUnit/L     15-37                      

 

             SGPT/ALT (test code = ALT)  IUnit/L     12-78                      

 

             ALKALINE PHOSPHATASE TOTAL (test code = ALKP)  IUnit/L       

                   





[id number] 23:16:00* 



             Test Item    Value        Reference Range Interpretation Comments

 

             LIPASE (test code = LIP)  U/L         73.0-393.0                 





QICCHVMY--[id number] 23:16:00* 



             Test Item    Value        Reference Range Interpretation Comments

 

             TROPONIN-I (test code = TROPI)  ng/mL       0-0.045                

    





CBC W/O [id number] 22:58:00* 



             Test Item    Value        Reference Range Interpretation Comments

 

             WHITE BLOOD CELL (test code = WBC) 5.3 K/mm3    4.5-12.5     N     

        

 

             RED BLOOD CELL (test code = RBC) 4.61 mill/mm3 4.0-5.8      N      

       

 

             HEMOGLOBIN (test code = HGB) 11.9 gram/dL 13.0-17.5    L           

  

 

             HEMATOCRIT (test code = HCT) 38.1 %       42.0-52.0    L           

  

 

             MEAN CELL VOLUME (test code = MCV) 82.6 fL      80-98        N     

        

 

             MEAN CELL HGB (test code = MCH) 25.8 picogram 27.0-33.0    L       

      

 

             MEAN CELL HGB CONCETRATION (test code = MCHC) 31.2 gram/dL 33.0-36.

0    L             

 

             RED CELL DISTRIBUTION WIDTH (test code = RDW) 13.5 %       11.6-16.

2    N             

 

             PLATELET COUNT (test code = PLT) 127 K/mm3    150-450      L       

      

 

             MEAN PLATELET VOLUME (test code = MPV) 12.2 fL      6.7-11.0     H 

            





- XR CHEST 1 -31-80 22:07:00 FAX: Jayson Salazra [phone]
   [place]:    St: PRE FAX:         Sobia Soto  DO                 
------------------------------------------------------------------------------- 
Name:   SHANNA CRUMP                     [facility]                     :
1970  Age/S: 48/M           [address] [facility]                Unit #: 
[id number]      Loc: [place]  [address]              Phys: 
Sobia Soto DO                                                 Acct: 
[id number] Dis Date:               Status: PRE ER                             
   PHONE #: [phone]     Exam Date:     2019     1003                 
 FAX #: [phone]     Reason: ABDOMINAL PAIN                                 
   EXAMS:                                               CPT CODE:      [id number]
XR CHEST 1 V                               15084                    EXAM: Chest 
X-ray, 1 view;               CLINICAL HISTORY: Severe abdominal pain and fever; 
             FINDINGS:       The lungs are clear,  no infiltrates, no edema;    
   no effusions; no pneumothorax;       normal cardiomediastinal silhouette.    
  No free subdiaphragmatic air.                 IMPRESSION:          Normal 
chest x-ray.                             ** Electronically Signed by RUSLAN Falk **           **             on 2019 at 2201             **
                     Reported and signed by: Hardeep Falk M.D.              
          CC: Jayson Caal; Sobia Soto DO                            
                                                                     
Technologist: Daniela Marvin RT(R); DAYA MUÑOZ RT (R)    [facility] 
Date/Time/By: 2019 (6792) : By: ManeGRW           Orig Print D/T: S: 
2019 (6075)                         PAGE  1                       Signed 
Report                               ABDOMEN-1VIEW (KUB)2018 14:27:00    
[facility]   [address] [place], [place] [address]      Patient Name: SHANNA CRUMP JR   MR #: [id number]    
: 1970 Age/Sex: 48/M  Acct #: [id number] Req #: [id number]  Adm Physicia
n:     Ordered by: CLOVIS ROSADO MD  Report #: [id number]   Location: [facility]  Room
/Bed:     ______________________________________________________________________
_____________________________    Procedure: [id number] DX/ABDOMEN-1VIEW (KUB)  Ex
am Date:                             Exam Time:        REPORT STATUS: Signed    
EXAM: Abdomen 1  Views   INDICATION:           COMPARISON: None      FINDINGS:  
The diaphragms and more superior aspect of the abdomen are not included in the  
exam.   Mild to moderate amount of stool in the colon. Air-filled loops of bowel
  overlying the kidneys.   No dilated loops of small bowel.      No renal carlos
culi. Few tiny radiopacities overlying the right upper quadrant may   represent 
an artifact.      No abnormal soft tissue masses.      Mild degenerative changes
in the lumbar spine and pelvis.      IMPRESSION:   1.  Suboptimal evaluation of 
the abdomen and pelvis due to overlying bowel gas   and stools.   2.  No abnorm
al calcifications overlying the visualized renal shadows and   pelvis. If clinic
al concern for renal stone, consider CT abdomen and pelvis   without contrast re
nal stone protocol.      Signed by: Dr. Anitra Schmidt M.D. on 20 2:30 PM        Dictated By: ANITRA SCHMIDT MD  Electronically Signed
By: ANITRA SCHMIDT MD on 18 1430  Transcribed By: THIEN on  1430       COPY TO:   CLOVIS ROSADO MD        CHEM [id number] 
09:43:006.0[facility] AreaCHEM JSTQU5765-51-45 09:43:83971WBCorpus Christi Medical Center Bay AreaCHEM TPXAA8226-98-24 09:43:0012[facility] AreaCHEM PANEL
2017 09:43:82151VGCorpus Christi Medical Center Bay AreaCHEM KVMIO8683-11-49 09:43:000.43[facility]CHE[facility] AMILI6234-81-75 09:43:65697EMCorpus [facility]i Medical Center Bay AreaCHEM 
PUQOP1253-52-98 09:43:003.7[facility] AreaCHEM BDEDW0154-01-69 09:43:00
118[facility] AreaCHEM HCPWL0191-05-14 09:43:0021[facility]
CHEM HHTTC7325-53-79 09:43:009.7[facility] AreaNuqonuNIICQIPYLT7115-63-46 
09:43:0023.8[facility] AreaDcusrkAHGIKNSNNK7710-84-14 09:43:008.[address] [place], [place] [address]LIPIDS2017-07-07 09:43:0017[facility] AreaLIPIDS2017-07-07
09:43:0035[facility] AreaLIPIDS2017-07-07 09:43:0086[facility] AreaLIPIDS2017-07-07 09:43:94108ETCorpus Christi Medical Center Bay AreaLIPIDS2017-07-07 
09:43:003.37[facility] AreaLIPIDS2017-07-07 09:43:0066[facility] AreaCHEM MOTOS9430-50-01 19:22:002.[address] [place], [place] [address]ELECTROLYTES
2017 21:05:0010.[address] [place], [place] [address]XcmveiNIUVIFIXPMDG9889-39-77 21:05:96585
[facility] AreaCuihbdPEHLXYTTAIMO5097-19-57 21:05:008.8[facility]
JHYEGYOGWOCE7230-19-41 21:05:0095[facility] AreaBifjdxTCRXLIGZIXUF1678-55-13 
21:05:004.[address] [place], [place] [address]DjjojqWFZZXLYUQRJF4998-89-39 21:05:0031[facility] AreaPibmflNIZPYODIYAHH7083-13-92 21:05:76615BHCorp[facility] Area
YPQQFOJRDIPF2012-48-70 21:05:0013[facility] AreaFkkbiwGXXIAOQQMWZE5660-94-51 
21:05:40704QXCorpus [facility] Center Bay AreaDhcqpcBKXFXSQVDHFN6795-32-14 21:05:000.76[facility] AreaCxsjztPGETXNFWMB3236-43-14 21:05:0035.[address] [place], [place] [address]HEMATOLOGY
2017 21:05:007.0[facility] AreaHelaieAVZLZNRBKF0347-61-44 21:05:001.[address] [place], [place] [address]BcbmjbQTGEBRLIAV2996-40-11 21:05:000.[address] [place] [address]
EEPFWHQYOR7716-95-49 21:05:000.[address] [place], [place] [address]KnmpisOQTGNHYYSA8614-94-23 
21:05:000.[address] [place], [place] [address]YlgaulHPGBCJLZBI4096-22-21 21:05:003.[address] [place], [place] [address]XvcylzJCUXSAHXMK2734-46-30 21:05:002.[address] [place], [place] [address]HEMATOLOGY
2017 21:05:0055.[address] [place], [place] [address]MctqhyJGCJVGAJXK5584-48-43 21:05:009.[address] [place], [place] [address]MrczadMELAIZCPKF1728-37-91 21:05:0013.[address] [place] [address]
UODJCBAIXB6475-38-08 21:05:88551PJCorpus Christi Medical Center Bay AreaPmgwmtFAUHQBXRZZ8331-35-40 
21:05:00* 



             Test Item    Value        Reference Range Interpretation Comments

 

             MCH (test code = MCH) 30.4 pg      27.0-31.0                  





[facility] AreaTtfkoxZVNEEMWTHG0333-98-63 21:05:0034.[address] [place] [address]
LIZJFVSGCF2031-69-56 21:05:0038.[address] [place], [place] [address]KlcmjfGWBDFYDBAB0798-95-17 
21:05:0088.[address] [place], [place] [address]DnlkfyHXGBTUUCJS6591-45-39 21:05:0013.[address] [place], [place] [address]DlqntjWSDYPFXQLC7431-23-23 21:05:004.[address] [place], [place] [address]HEMATOLOGY
2017 21:05:006.[address] [place], [place] [address]PECIAL TZRSDFPPF7987-98-73 
21:05:009.[address] [place], [place] [address]BLOOD BANK DAPRKUM5204-90-99 17:00:00Negative 
(4/16/15 12:00 PM)[facility] AreaCHEM FAAOI0491-17-99 15:00:32432GGCorp[facility] Medical Center Bay AreaCHEM YJOSK5789-16-82 15:00:21010JZCorp[facility] Bay AreaCHEM 
OAPEP2590-90-21 15:00:009.2MShannon Medical CenterCHEM NHRIW2024-39-65 15:00:00
30[facility] AreaCHEM XSRAK6672-49-47 15:00:003.9[facility]
CHEM SREQP3769-16-04 15:00:0015[facility] AreaCHEM GYITC7571-00-59 
15:00:000.7[facility] AreaCHEM BQYKO5541-45-85 15:00:87992KBCorpus Christi Medical Center Bay AreaCHEM XSZHS6688-41-71 15:00:95462LFCorpus Christi Medical Center Bay AreaCHEM PANEL
2015 15:00:0011.9[facility] AreaGxrdzyYHOHOBYVEQ5233-32-19 15:00:000.[address] [place], [place] [address]JcrzhkVXREFCWBVV5648-80-00 15:00:000.2MSh[facility] Center
YIJRTEJHIK5476-11-86 15:00:002.2MShannon Medical CenterQwgsoqKDSGJGUCRF4486-94-08 
15:00:002.2MShannon Medical CenterZuhdsaBCHORZYFEN5651-56-11 15:00:005.[address] [place], [place] [address]DjbpgvWXNIAXLBTO3555-76-70 15:00:0062.7[facility] AreaHEMATOLOGY
2015 15:00:0027.4[facility] AreaXnwixzONHQAVJOOH5419-13-30 15:00:007.3MCH[facility] – Medical CenterTfydbcHVOEJLXMLY2862-75-05 15:00:000.4[facility]
OROIMQCBEW7049-09-05 15:00:009.8[facility] NvfbmiGKLPSZOSDS5575-33-11 
15:00:004.54[facility] DzxebcPDBJOKZOTD8749-81-26 15:00:008.[address] [place], [place] [address]XfzyrlROVFBRFJED2296-15-31 15:00:0014.[address] [place], [place] [address]HEMATOLOGY
2015 15:00:0040.7[facility] Bay AreaLgtfydHNUYJYLAVM3828-48-52 15:00:0089.7
[facility] AreaPpbfqsUHJIIUPMBW8704-98-44 15:00:87735PDCorpus Christi Medical Center [facility] Area
DEHVEKOEVP0227-79-08 15:00:0034.5[facility] AreaLtuerwDIRTEUFLSL8231-83-15 
15:00:0012.8[facility] AreaMcubwgIYIWIDYDNR1596-99-81 15:00:00* 



             Test Item    Value        Reference Range Interpretation Comments

 

             MCH (test code = MCH) 31.0 pg      27.0-31.0                  





[facility] AreaHjlwrjFVRELTDODD1072-16-70 15:00:001.02[facility]
DYGODDOQLP8977-37-07 15:00:00* 



             Test Item    Value        Reference Range Interpretation Comments

 

             PTT (test code = PTT) 37.1 s       22.9-35.8                  





[facility] AreaQqvkycGRCESMWXOL8534-63-97 15:00:00* 



             Test Item    Value        Reference Range Interpretation Comments

 

             PT (test code = PT) 13.4 s       12.0-14.7                  





[facility] AreaABDOMEN-1VIEW (KUB)    [place] [address]      Patient Name: 
SHANNA CRUMP JR   MR #: [id number]    : 1970 Age/Sex: 48/M  Acct #: 
[id number] Req #: [id number]  Adm Physician:     Ordered by: PEDRO ROSADO MD  
Report #: [id number]   Location: [facility]  Room/Bed:     
__________________________________________________________________________
_________________________    Procedure:  DX/ABDOMEN-1VIEW (KUB)  Exam D
ate: 05/10/18                            Exam Time: 1230       REPORT STATUS: Si
gned    PROCEDURE:   X-RAY ABDOMEN - KUB       COMPARISON:   None.       INDICAT
IONS:   CALCULUS OF KIDNEY       FINDINGS:      There is a non-obstructed bowel-
gas pattern. Moderate amount of stool.    Left upper quadrant surgical clips and
bowel sutures. Several punctate    radiodensity overlying the left renal shadow 
but adjacent to these    surgical clips. There are no acute osseous abnormaliti
es. The lung    bases are clear.       CONCLUSION:      Several punctate radiode
nsity overlying the left renal shadow. However,    this is adjacent to the surgi
carlos clips. This is indeterminate and may    represent renal stones versus prior 
postsurgical changes.        Dictated by:  Fawad Monson M.D. on 5/10/2018 at 12:59  
     Electronically approved by:  Fawad Monson M.D. on 5/10/2018 at 12:59           
    Dictated By: FAWAD MONSON MD  Electronically Signed By: FAWAD MONSON MD on 05/10/18 
125  Transcribed By: NOBLE on 05/10/18 1259       COPY TO:   PEDRO ROSADO MD

## 2020-05-28 NOTE — XMS REPORT
Clinical Summary

                             Created on: 2020



Norman Epperson Jr.

External Reference #: [id number]

: 1970

Sex: Male



Demographics





                          Address                   [address] [place] [address] [address]

[place]  [address]

 

                          Home Phone                [phone]

 

                          Preferred Language        English

 

                          Marital Status            

 

                          Hoahaoism Affiliation     Unknown

 

                          Race                      White

 

                          Ethnic Group              Non-





Author





                          Author                    [facility]

 

                          Organization              [facility]

 

                          Address                   Unknown

 

                          Phone                     Unavailable







Support





                Name            Relationship    Address         Phone

 

                Amy Epperson     ECON            Unknown         [phone]







Care Team Providers





                    Care Team Member Name Role                Phone

 

                    Jayson Caal DO PCP                 [phone]







Allergies

No Known Allergies



Medications





                          End Date                  Status



              Medication   Sig          Dispensed    Refills      Start  



                                         Date  

 

                                                    Active



                 escitalopram (LEXAPRO) 20  TK 2 TS PO      2               

3201  



                     MG tablet           QAM                 8  

 

                                                    Active



                 oxyCODone (OxyCONTIN) 40  TAKE ONE (1)    0                 



                     mg tablet,oral      TABLET(S) BY        9  



                           only,ext.rel.12 hr ER     MOUTH TWICE A     



                           tablet                    DAY.     

 

                                                    Active



                     HYDROcodone-acetaminophen    0                    

 



                           (NORCO)  mg per     9  



                                         tablet      

 

                                                    Active



                     gabapentin (NEURONTIN)    0                     



                           600 mg tablet             9  

 

                                                    Active



                     TiZANidine (ZANAFLEX) 4    1                   12/10/201  



                           MG capsule                8  

 

                                                    Active



                     hydroCHLOROthiazide    1                     



                           (HYDRODIURIL) 25 MG       8  



                                         tablet      

 

                                                    Active



                     lisinopril          1                     



                           (PRINIVIL,ZESTRIL) 20 mg     8  



                                         tablet      

 

                                                    Active



                 zolpidem (AMBIEN) 10 mg  TK 1 T PO       2               01/10/

201  



                     tablet              QHS FOR             9  



                                         INSOMNIA     

 

                                                    Active



                     NOVOLIN 70/30 U-100    1                     



                           INSULIN 100 unit/mL       8  



                                         (70-30) injection      

 

                                                    Active



                     cyanocobalamin (VITAMIN  Take 1,000          0   



                           B-12) 1000 MCG tablet     mcg by mouth     



                                         daily.     

 

                                                    Active



                     multivit-minerals/ferrous  Take by             0   



                           fum (MULTI VITAMIN ORAL)  mouth.     

 

                                                    Active



                     potassium gluconate 595  Take by             0   



                           mg (99 mg) tablet         mouth.     

 

                                                    Active



                     calcium carb/vit    Take by             0   



                           D3/minerals               mouth.     



                                         (CALCIUM-VITAMIN D ORAL)      

 

                                                    Active



                     cholecalciferol, vitamin  Take 2,000          0   



                           D3, (VITAMIN D3) 2,000    Units by     



                           unit capsule capsule      mouth daily.     

 

                                                    Active



                     ginkgo biloba (GINKGO  Take by             0   



                           ORAL)                     mouth.     

 

                          2020                



              acetaminophen-codeine  Take 1-2     15 tablet    0              



                     (TYLENOL WITH CODEINE #3)  tablets by          0  



                           300-30 mg per             mouth every 6     



                           tabletIndications: acute  (six) hours     



                           pain                      as needed for     



                                         moderate pain     



                                         for up to 15     



                                         days .acute     



                                         pain.     







Active Problems





Not on file



Encounters





                          Care Team                 Description



                     Date                Type                Specialty  

 

                                        



Radames Del Cid MD                    Lumbar radiculopathy (Primary Dx); 

Lumbar sprain, initial encounter



                     2020          Emergency           Emergency Medicine 

 



after 2019



Family History





   



                 Medical History  Relation        Name            Comments

 

   



                           Hypertension              Father  

 

   



                           Kidney disease            Father  

 

   



                           Scoliosis                 Father  

 

   



                           Hypertension              Mother  

 

   



                           Lung cancer               Mother  

 

   



                           Thyroid disease           Mother  







   



                 Relation        Name            Status          Comments

 

   



                                         Father   

 

   



                                         Mother   







Social History





                                        Date



                 Tobacco Use     Types           Packs/Day       Years Used 

 

                                         



                                         Never Smoker    

 

    



                                         Smokeless Tobacco: Never   



                                         Used   







                    Drinks/Week         oz/Week             Comments



                                         Alcohol Use   

 

                                                             



                                         No   







  



                     Alcohol Habits      Answer              Date Recorded

 

  



                     How often do you have a drink containing alcohol?  Never   

            2019

 

  



                           How many drinks containing alcohol do you have on  No

t asked 



                                         a typical day when you are drinking?  

 

  



                           How often do you have six or more drinks on one  Not 

asked 



                                         occasion?  







 



                           Sex Assigned at Birth     Date Recorded

 

 



                                         Not on file 







                                        Industry



                           Job Start Date            Occupation 

 

                                        Not on file



                           Not on file               Not on file 







                                        Travel End



                           Travel History            Travel Start 

 





                                         No recent travel history available.







Last Filed Vital Signs





                    Reading             Time Taken          Comments



                                         Vital Sign   

 

                    145/88              2020  6:15 PM CST  



                                         Blood Pressure   

 

                    60                  2020  6:15 PM CST  



                                         Pulse   

 

                    37 C (98.6 F)   2020  2:52 PM CST  



                                         Temperature   

 

                    18                  2020  6:15 PM CST  



                                         Respiratory Rate   

 

                    99%                 2020  6:15 PM CST  



                                         Oxygen Saturation   

 

                    -                   -                    



                                         Inhaled Oxygen   



                                         Concentration   

 

                    83.9 kg (185 lb)    2020  2:52 PM CST  



                                         Weight   

 

                    177.8 cm (5' 10")   2020  2:52 PM CST  



                                         Height   

 

                    26.54               2020  2:52 PM CST  



                                         Body Mass Index   







Plan of Treatment





   



                 Health Maintenance  Due Date        Last Done       Comments

 

   



                           DIABETIC RETINAL EYE EXAM  1970  

 

   



                           DIABETIC FOOT EXAM        04/15/1980  

 

   



                           URINE MICROALBUMIN        04/15/1980  

 

   



                           COLONOSCOPY SCREENING     04/15/2020  

 

   



                           SHINGLES VACCINES (#1)    04/15/2020  

 

   



                           INFLUENZA VACCINE         2020  







Procedures





                                        Comments



                 Procedure Name  Priority        Date/Time       Associated Diag

nosis 

 

                                        



Results for this procedure are in the results section.



                     XR HIP 2-3 VIEWS LEFT  STAT                2020  



                                         5:14 PM CST  

 

                                        



Results for this procedure are in the results section.



                     CT LUMBAR SPINE WO  STAT                2020  



                           CONTRAST                  4:11 PM CST  



after 2019



Results

* XR Hip 2-3 View Left (2020  5:14 PM CST)







                                         Specimen

 









 



                           Narrative                 Performed At

 

 



                           EXAMINATION: XR HIP 2-3 VIEWS LEFT  [facility]



                                         CLINICAL HISTORY: s p fall 



                                         TECHNIQUE:  3 views of left hip o

btained. 



                                         COMPARISON: None. 



                                         IMPRESSION: 



                                         Mild to moderate OA. An osseous bump at

 the femoral head neck junction; 

clinical 



                                         evidence of femoral acetabular impingem

ent. No acute fracture or dislocation 



                                         identified. 



                                         [id number] 







                                        Procedure Note

 

                                        



Hm Interface, Radiology Results Incoming - 2020  5:19 PM CST



EXAMINATION:  XR HIP 2-3 VIEWS LEFT



CLINICAL HISTORY:  s p fall



TECHNIQUE:   3  views of left hip  obtained.



COMPARISON:  None.



IMPRESSION:

Mild to moderate OA. An osseous bump at the femoral head neck junction; clinical
evidence of femoral acetabular impingement. No acute fracture or dislocation 
identified. 







[facility]-[id number]











   



                 Performing Organization  Address         [facility]  Ph

one Number

 

   



                     [facility]          [address] [place] [address] 





* CT Lumbar Spine Wo Contrast (2020  4:11 PM CST)







                                         Specimen

 









 



                           Narrative                 Performed At

 

 



                           EXAMINATION: CT LUMBAR SPINE WO CONTRAST  [facility]

T



                                         CLINICAL HISTORY: s p fall, 



                                         COMPARISON: None. 



                                         Technique: Multiple axial CT images of 

the lumbar spine are obtained without 

the 



                                         use of intravenous contrast. Coronal an

d sagittal 3-D reconstructions are 



                                         obtained. 



                                         CT scans are performed using radiation 

dose reduction techniques. Technical 



                                         factors are evaluated and adjusted to e

nsure appropriate moderation of 

exposure. 



                                         Automated dose management technology 

is applied to adjust radiation exposure 



                                         while achieving a 



                                         diagnostic quality image. 



                                         FINDINGS: 



                                         The visualized portions of the retroper

itoneum are unremarkable. The abdominal 



                                         aorta has no aneurysmal dilatation. The

re is no free fluid seen within the 



                                         pelvis. 



                                         The vertebral bodies maintain normal he

ight and alignment. There is no acute 



                                         fracture or subluxation. 



                                         Degenerative changes are present within

 the cervical spine. There are small 



                                         osteophytes seen posteriorly at level L

4-L5. There is minimal annular bulging 



                                         seen at level L4-L5. There is no narrow

ing of the spinal canal. 



                                         The SI joints are unremarkable. The sac

rum demonstrates no abnormality. 



                                         IMPRESSION: 



                                         1. There is no acute fracture or sublux

ation. 



                                         2. There are minimal degenerative lara

es present consistent with 



                                         osteoarthritis. 



                                         3. There are no osseous lesions present

. 







                                        Procedure Note

 

                                        



 Interface, Radiology Results Incoming - 2020  4:20 PM CST



EXAMINATION:  CT LUMBAR SPINE WO CONTRAST



CLINICAL HISTORY:  s p fall, 



COMPARISON:  None.



Technique: Multiple axial CT images of the lumbar spine are obtained without the
use of intravenous contrast. Coronal and sagittal 3-D reconstructions are 
obtained.



CT scans are performed using radiation dose reduction techniques.  Technical 
factors are evaluated and adjusted to ensure appropriate moderation of exposure.
 Automated dose management technology is applied to adjust radiation exposure 
while achieving a 

diagnostic quality image.









FINDINGS:



The visualized portions of the retroperitoneum are unremarkable. The abdominal 
aorta has no aneurysmal dilatation. There is no free fluid seen within the 
pelvis.



The vertebral bodies maintain normal height and alignment. There is no acute 
fracture or subluxation. 



Degenerative changes are present within the cervical spine. There are small 
osteophytes seen posteriorly at level L4-L5. There is minimal annular bulging 
seen at level L4-L5. There is no narrowing of the spinal canal.



The SI joints are unremarkable. The sacrum demonstrates no abnormality.





IMPRESSION:



                                        1. There is no acute fracture or subluxa

tion.

                                        2. There are minimal degenerative change

s present consistent with 

osteoarthritis.

                                        3. There are no osseous lesions present.









   



                 Performing Organization  Address         [facility]  Ph

one Number

 

   



                      [facility]          [address] [place] [address] 





after 2019



Insurance





                                        Type



            Payer      Benefit    Subscriber ID  Effective  Phone      Address 



                           Plan /                    Dates   



                                         Group     

 

                                        Medicare



              MEDICARE     MEDICARE     xxxxxxxxxxx  3/1/2014-P   [place], 



                     PART [place] AND          resent              [place] 



                                         B     

 

                                        PPO



                 BCBS            BCBS            xxxxxxxxxxxx    2018-P   



                           CHOICE                    resent   



                                         PPO/[employer]IE JAMES PPO     







     



            Guarantor Name  Account    Relation to  Date of    Phone      Oanh sanders Address



                     Type                Patient             Birth  

 

     



            Norman Epperson DANI Cesar.  Personal/F  Self       1970  [phone] [address] 

[id number]



                     amily               (Home)              [place] [address]







Advance Directives





For more information, please contact: [phone]







                          Patient Representative    Explanation



                           Type                      Date Recorded  

 

                                                     



                           Advance Directives,       2020  6:26 PM  



                                         Living Will and   



                                         Medical Power of

## 2020-05-28 NOTE — XMS REPORT
Continuity of Care Document

                             Created on: 2020



SHANNA CRUMP JR

External Reference #: [id number]

: 1970

Sex: Male



Demographics





                          Address                   [address] [place] [address] [address]

[place]  [address]

 

                          Home Phone                [phone]

 

                          Preferred Language        English

 

                          Marital Status            Unknown

 

                          Muslim Affiliation     Unknown

 

                          Race                      Unknown

 

                          Ethnic Group              Unknown





Author





                          Author                    Diane Watters Coubic

 SHANNA Mcclain              [employer]

 Exchange

 

                          Address                   Unknown

 

                          Phone                     Unavailable







Care Team Providers





                    Care Team Member Name Role                Phone

 

                    [facility] [employer] Unavailable         Un

available



                                    



Problems

                    



                    Problem                         Status                      

   Onset Date       

                          Classification                         Date Reported  

         

                          Comments                         Source               

     

 

                          STENOSIS, SPONYLOSIS, DISC DEGENERATION               

          Active          

                    2017                                                  

      

                                                    [facility]     

         

     

 

                          LUM STENOSIS, LUM SPONDYLOSIS, LUM DISC               

          Active          

                    2015                                                  

      

                                                    [facility]     

         

     

 

                    Anxiety (finding)                         Active            

                    

                          Problem                         06/15/2019            

         

                                                    [facility]

PID Malone            

       

 

                    Backache (finding)                         Active           

                    

                          Problem                         06/15/2019            

        

                                                    [facility]

PID Malone           

        

 

                          Diabetes mellitus (disorder)                         A

ctive                     

                                             Problem                         06/

15/2019          

                                                    [facility]

PID Malone 

                  

 

                          Depressive disorder (disorder)                        

 Active                   

                                             Problem                         06/

15/2019        

                                                    [facility]

PID 

Malone                    

 

                          Obstructive sleep apnea syndrome (disorder)           

              Active      

                                             Problem                         

06/15/2019                                                   [facility] OPID Malone                    

 

                    Pain (finding)                         Active               

                    

                          Problem                         06/15/2019            

            

                                                    [facility]

PID Malone               

    

 

                          Spinal stenosis in cervical region (disorder)         

                Active    

                                             Problem                         

06/15/2019                                                   [facility] OPID Malone                    

 

                    Final:                                                      

                    

                                             2015                         

                    

                                        [facility]                 

   

 

                    SPIN STEN,LUMBR W YAAKOV                         Active      

                    

                                                                                

                                        [facility]                 

   

 

                    LUMBOSACRAL SPONDYLOSIS                         Active      

                    

                                                                                

                                        [facility]                 

   



                                                                                
                                                                                
                                                                                
     



Medications

                    



                    Medication                         Details                  

       Route        

                          Status                         Patient Instructions   

          

                          Ordering Provider                         Order Date  

               

                                        Source                    

 

                          Lisinopril                         Notes: (Same as: Pr

inivil, Zestril)          

                                             No Longer Active                   

      

                                                    2017                  

   

                                        [facility]                 

   

 

                          lisinopril 20 mg oral tablet                         2

0 mg = 1 tab, PO, Daily, 0

Refill(s)                                                   Active              

 

                                                                      2017

           

                                        [facility]                 

   

 

                                        pneumococcal capsular polysaccharide typ

e 1 vaccine / pneumococcal capsular 

polysaccharide type 10A vaccine / pneumococcal capsular polysaccharide type 11A 
vaccine / pneumococcal capsular polysaccharide type 12F vaccine / pneumococcal 
capsular polysacchar                         Notes: (Same as: Pneumovax 23)  

Refrigerate                                                   Inactive          

 

                                                                      2017

       

                                        [facility]                 

   

 

                          Magnesium Oxide                         Notes: (Same a

s: Mag-Ox 400) Magnesium 

oxide [id number] elemental magnesium Dose=____mg magnesium oxide (___mg 
elemental magnesium)                                                   No Longer

 

Active                                                                          

 

                          2017                         [facility]

nter                    

 

                                        NPH Insulin, Human 70 UNT/ML / Regular I

nsulin, Human 30 UNT/ML Injectable 

Suspension                              Notes: Roll in palms of hands gently; Do

 not 

shake. (Same as: NovoLIN Mix 70/30) Do not hold insulin without contacting 
prescriber  WASTE: F/P - Black; E - Municipal Trash Bin                         

                          No Longer Active                                      

  

                                             2017                         

[facility]                    

 

                          Cefazolin                         Notes: (Same As: Anc

ef, Kefzol)    *** 

MEDICATION WASTE *** Product Size:  1000 mg Product Wasted:  ___ mg             
                                             No Longer Active                   

         

                                                    2017                  

      

                                        [facility]                 

   

 

                          Insulin regular                           60 units)  W

ASTE: F/P - Black; E - 

Municipal Trash Bin  Stable for 28 days at room temperature Expires in ______ 
days from ______________Date                                                   N

o

Longer Active                                                                   

 

                          2017                         [facility]

nter               

    

 

                          Glucagon                         1 mg, Route: IM, Drug

 form: PDR/INJ, PRN, 

Dosing Weight 97.727, kg, PRN Blood Glucose Results, Start date: 17 
16:02:00 CDT, Duration: 30 day, Stop date: 17 16:01:00 CDT                
                                             No Longer Active                   

            

                                             2017                         

[facility]                    

 

                          Dextrose 50% Syringe                         25 gm, 50

 mL, Route: IVP, Drug 

Form: INJ, Dosing Weight 97.727, kg, PRN, PRN Blood Glucose Results, Start date:
17 16:02:00 CDT, Duration: 30 day, Stop date: 17 16:01:00 CDT       
                                                    No Longer Active            

          

                                                                      2017

                  

                                        [facility]                 

   

 

                          gabapentin                         Notes: (Same as: Ne

urontin)                  

                                             No Longer Active                   

              

                                             2017                         

[facility]                    

 

                          Acetaminophen                         Notes: Max aceta

minophen 4000 mg/day (4 

gm/day).  (Same as: Tylenol Extra Strength)                                     

                    No Longer Active                                            

        

                          2017                         [facility]

nter

                   

 

                          Oxycontin                         Notes: Do not crush 

or chew. (Same as: 

OxyContin)                                                   No Longer Active   

 

                                                                      2017

                                        [facility]                 

   

 

                          Oxycodone Hydrochloride 5 MG Oral Tablet              

           Notes: (Same 

as: Roxicodone)                                                   No Longer 

Active                                                                          

 

                          2017                         [facility]

nt                    

 

                          ketOROLAC 30 mg/mL injectable solution                

          4 days.         

                                                    No Longer Active            

            

                                                                      2017

                    

                                        [facility]                 

   

 

                                        24 HR tramadol hydrochloride 100 MG Exte

nded Release Tablet                     

                          Notes: Not to exceed 400mg/day. (Same As: Ultram)     

                       

                     Inactive                                                   

                          2017                         [facility]                    

 

                          tramadol hydrochloride 50 MG Oral Tablet              

           50 mg, Route: 

PO, Drug form: TAB, Q6H, Dosing Weight 97.727, kg, PRN Pain Score 1-3, Start 
date: 17 11:01:00 CDT, Duration: 30 day, Stop date: 17 11:00:00 CDT 
                                                    Inactive                    

    

                                                                      2017

                    

                                        [facility]                 

   

 

                          Hydromorphone                         Notes: Same as: 

Dilaudid                  

                                             Inactive                           

              

                                             2017                         

[facility]                    

 

                          Oxycodone                         Notes: (Same as: Winifred

icodone)                  

                                             Inactive                           

              

                                             2017                         

[facility]                    

 

                          Ondansetron                         Notes: (Same as: JEFE berry)   *** MEDICATION 

WASTE *** Product Size:  4 mg Product Wasted:  ___ mg                           

                      Inactive                                                  

                          2017                         [facility]                    

 

                          neostigmine (ANES)                         Route: IV, 

Drug form: INJ, ONCE, Stop

date: 17 10:53:00 CDT                                                   

Inactive                                                                        

 

                          2017                         [facility]

nter                    

 

                          glycopyrrolate (ANES)                         Route: I

V, Drug form: INJ, ONCE, 

Stop date: 17 10:53:00 CDT                                                

                    Inactive                                                    

                   

                          2017                         [facility]

nter                   



 

                          ondansetron (ANES)                         Route: IV, 

Drug form: INJ, ONCE, Stop

date: 17 10:53:00 CDT                                                   

Inactive                                                                        

 

                          2017                         [facility]

nter                    

 

                    ketOROLAC (ANES)                         IV, ONCE           

                    

                    Inactive                                                    

  

                          2017                         [facility]

nter  

                 

 

                          LR 1000 mL INJ (ANES)                         Route: I

V, Total Volume: 1,000, 

Start date: 17 10:08:00 CDT, Stop date: 17 11:08:00 CDT             
                                             Inactive                           

         

                                             2017                         

[facility]                    

 

                          phenylephrine (ANES)                         Route: IV

, Drug form: INJ, ONCE, 

Stop date: 17 10:07:00 CDT                                                

                    Inactive                                                    

                   

                          2017                         [facility]

nter                   



 

                          dexamethasone (ANES)                         Route: IV

, Drug form: INJ, ONCE, 

Stop date: 17 9:57:00 CDT                                                 

                    Inactive                                                    

                    

                          2017                         [facility]

nter                    

 

                          rocuronium (ANES)                         Route: IV, D

rug form: INJ, ONCE, Stop 

date: 17 9:42:00 CDT                                                   

Inactive                                                                        

 

                          2017                         [facility]

nter                    

 

                          lidocaine (ANES)                         Route: IV, Dr

ug form: INJ, ONCE, Stop 

date: 17 9:42:00 CDT                                                   

Inactive                                                                        

 

                          2017                         [facility]

nter                    

 

                          propofol (ANES)                         Route: IV, Matty

g form: INJ, ONCE, Stop 

date: 17 9:42:00 CDT                                                   

Inactive                                                                        

 

                          2017                         [facility]

nt                    

 

                          fentaNYL (ANES)                         Route: IV, Matty

g form: INJ, ONCE, Stop 

date: 17 9:42:00 CDT                                                   

Inactive                                                                        

 

                          2017                         [facility]

nt                    

 

                          ceFAZolin (ANES)                         Route: IV, Dr

ug form: INJ, ONCE, Stop 

date: 17 9:42:00 CDT                                                   

Inactive                                                                        

 

                          2017                         [facility]

nter                    

 

                          midazolam (ANES)                         Route: IV, Dr

ug form: SOLN, ONCE, Stop 

date: 17 9:37:00 CDT                                                   

Inactive                                                                        

 

                          2017                         [facility]

nter                    

 

                          Protonix                         Notes: For IV push re

constitute with 10 ml 0.9%

sodium chloride and push over 2 minutes. (Same as: Protonix)                    
                                             No Longer Active                   

                

                                             2017                         

[facility]                    

 

                          SUFentanil (ANES) (ANES)                         Route

: IV, Drug form: INJ, 

Start date: 17 9:00:00 CDT, Stop date: 17 10:00:00 CDT              
                                             Inactive                           

          

                                             2017                         

[facility]                    

 

                                        sodium chloride 0.9% 100 ml INJ (ANES) +

 ketAMINE (ANES) (ANES)                 

                                        Route: IV, Drug form: INJ, Start date: 17 9:00:00 CDT, Stop date: 

17 10:00:00 CDT                                                   Inactive

 

                                                                         

2017                              [facility]                 

   

 

                                        sodium chloride 0.9% 100 ml INJ (ANES) +

 magnesium sulfate (ANES) (ANES)        

                                        Route: IV, Drug form: INJ, Start date: 17 9:00:00 CDT, 

Stop date: 17 10:00:00 CDT                                                

                    Inactive                                                    

                   

                          2017                         [facility]

nter                   



 

                                        sodium chloride 0.9% 100 ml INJ (ANES) +

 lidocaine (ANES) (ANES)                

                                        Route: IV, Drug form: INJ, Start date: 17 9:00:00 CDT, Stop date: 

17 10:00:00 CDT                                                   Inactive

 

                                                                         

2017                              [facility]                 

   

 

                          Oxycontin                         40 mg, 2 tab, Route:

 PO, Drug form: ERTAB, 

Q12H, Dosing Weight 97.727, kg, Start date: 17 9:00:00 CDT, Duration: 30 
day, Stop date: 17 21:00:00 CDT                                           

                    Inactive                                                    

              

                          2017                         [facility]

nter              

     

 

                          Dexamethasone                         Notes: Concentra

tion: 4mg/ml              

                                             No Longer Active                   

          

                                             2017                         

[facility]                    

 

                          Docusate Sodium 100 MG Oral Capsule [Colace]          

               Notes: 

(Same as: Colace) (Do Not Crush)                                                

                    No Longer Active                                            

                  

                          2017                         [facility]

nter         

           

 

                          Metformin hydrochloride 500 MG Oral Tablet            

             Notes: (Same 

as: Glucophage)  Take with meal                                                 

                    No Longer Active                                            

                   

                          2017                         [facility]

nter          

          

 

                          ReliOn/NovoLIN 70/30                         30 unit, 

Route: SUB-Q, Daily, 

Dosing Weight 97.727, kg, Start date: 17 9:00:00 CDT, Duration: 30 day, 
Stop date: 17 9:00:00 CDT                                                 

                    Inactive                                                    

                   

                          2017                         [facility]

nter                  

  

 

                          gabapentin 600 MG Oral Tablet                         

Notes: (Same as: 

Neurontin)                                                   Inactive           

 

                                                                      2017

       

                                        [facility]                 

   

 

                          Escitalopram                         Notes: (Same as: 

Lexapro)                  

                                             No Longer Active                   

             

                                             2017                         

[facility]                    

 

                          Zofran                         Notes: (Same as: Zofran

)   *** MEDICATION WASTE 

*** Product Size:  4 mg Product Wasted:  ___ mg                                 

                          No Longer Active                                      

         

                                             2017                         

[facility]                    

 

                          tramadol hydrochloride 50 MG Oral Tablet              

           Notes: Not to 

exceed 400mg/day. (Same As: Ultram)                                             

                    No Longer Active                                            

               

                          2017                         [facility]

nter      

              

 

                          tizanidine                         Notes: (Same As: Za

naflex)                   

                                             No Longer Active                   

              

                                             2017                         

[facility]                    

 

                          sennosides, USP                         Notes: (Same a

s: Senokot)               

                                             No Longer Active                   

          

                                                    2017                  

      

                                        [facility]                 

   

 

                          Phenergan                         Notes: Do not give I

V push.  (Same as: 

Phenergan)                                                   No Longer Active   

 

                                                                       

2017                              [facility]                 

   

 

                          sodium chloride 0.9% 1000 ml INJ 1,000 mL             

            1,000 mL, 

Rate: 75 ml/hr, Infuse over: 13.3 hr, Route: IV, Dosing Weight 97.727 kg, Total 
Volume: 1,000, Start date: 17 8:42:00 CDT, Duration: 30 day, Stop date: 
17 8:41:00 CDT                                                   No Longer

 

Active                                                                          

 

                          2017                         [facility]

nter                    

 

                          Morphine                         Notes: (Same as:MORPh

ine Sulfate)              

                                             Inactive                           

         

                                             2017                         

[facility]                    

 

                          Diphenhydramine                         Notes: (Same a

s: Benadryl)              

                                             No Longer Active                   

         

                                                    2017                  

     

                                        [facility]                 

   

 

                          cyclobenzaprine                         Notes: (Same A

s: Flexeril)              

                                             Inactive                           

         

                                             2017                         

[facility]                    

 

                                        Benzocaine 15 MG / Menthol 3.6 MG Lozeng

e [Cepacol Sore Throat Pain Relief 

15/3.6]                                 Notes: Cepacol lozenges  Dispense 1 box 

= 16 

lozenges  (Same As: Cepacol Lozenges)                                           

                    No Longer Active                                            

              

                          2017                         [facility]

nter      

             

 

                    Ambien                         Notes: (Same As: Ambien)     

                    

                          No Longer Active                                      

  

                                             2017                         

[facility]                    

 

                          Lorazepam                         Notes: (Same as: Christopher villegas)                      

                                             No Longer Active                   

                  

                                             2017                         

[facility]                    

 

                          ReliOn/NovoLIN R                         10 unit, Rout

e: SUB-Q, Drug form: SOLN,

PRN, Dosing Weight 97.727, kg, PRN Blood Glucose Results, Start date: 17 
8:39:00 CDT, Duration: 30 day, Stop date: 17 8:38:00 CDT                  
                                             Inactive                           

              

                                             2017                         

[facility]                    

 

                          Insulin regular                         6 unit, Route:

 SUB-Q, ONCE, Dosing 

Weight 97.727, kg, Start date: 17 8:01:00 CDT, Stop date: 17 8:01:00
CDT                                                   Inactive                  

 

                                                                      2017

               

                                        [facility]                 

   

 

                                        Acetaminophen 325 MG / Hydrocodone Yesenia

trate 10 MG Oral Tablet [Norco 10/325]  

                                        Notes: Do not exceed 4gm/day of acetamin

ophen.  (Same as: 

Norco 325/10)                                                   Inactive        

 

                                                                      2017

     

                                        [facility]                 

   

 

                          gabapentin 300 MG Oral Capsule                        

 Notes: (Same as: 

Neurontin)                                                   Inactive           

 

                                                                      2017

        

                                        [facility]                 

   

 

                          tizanidine 4 mg oral capsule                         4

 mg = 1 cap, PO, TID, As 

needed                                                   Active                 

 

                                                                      2017

              

                                        [facility]                 

   

 

                          Metformin hydrochloride 500 MG Oral Tablet            

             500 mg = 1 

tab, PO, BID                                                   Active           

 

                                                                      2017

        

                                        [facility]                 

   

 

                          gabapentin 600 MG Oral Tablet                         

600 mg = 1 tab, PO, BID   

                                              Active                            

                                                    2017                  

      

                                        [facility]                 

   

 

                          Lorazepam 1 MG Oral Tablet                         1 m

g = 1 tab, PO, PRN        

                                             Active                             

    

                                             2017                         

[facility]                    

 

                                        12 HR Oxycodone Hydrochloride 40 MG Exte

nded Release Tablet [Oxycontin]         

                          40 mg = 1 tab, PO, Q12H                               

          

                    Active                                                      

           

                          2017                         [facility]

nter            

        

 

                          Diclofenac Sodium 0.01 MG/MG Topical Gel              

           TOP            

                                             No Longer Active                   

       

                                                    2017                  

   

                                        [facility]                 

   

 

                                        Regular Insulin, Human 100 UNT/ML Inject

able Solution [Novolin R]               

                    10 unit, SUB-Q, PRN                                         

         No

Longer Active                                                                   

 

                          2017                         [facility]

nter               

    

 

                          ReliOn/NovoLIN 70/30                         30 unit, 

SUB-Q, Daily              

                                             No Longer Active                   

          

                                             2017                         

[facility]                    

 

                          escitalopram 20 mg oral tablet                        

 20 mg = 1 tab, PO, Daily 

                                                Active                          

                                                    2017                  

    

                                        [facility]                 

   

 

                          Lidocaine Hydrochloride 0.03 MG/MG Topical Gel        

                 1 appl, 

TOP                                                   No Longer Active          

 

                                                                      2017

       

                                        [facility]                 

   

 

                          Oxycontin                         Notes: (Same as: Oxy

Contin)                   

                                             No Longer Active                   

               

                                             2015                         

[facility]                    

 

                          Dexamethasone                         Notes: Concentra

tion: 4mg/ml              

                                             No Longer Active                   

          

                                             2015                         

[facility]                    

 

                          Vitamin B-12 500 mcg oral tablet                      

   500 microgram = 1 tab, 

PO, Daily                                                   Active              

 

                                                                      2015

           

                                        [facility]                 

   

 

                          Calcium 600 +D oral tablet                         1 t

ab, PO, Daily             

                                             Active                             

         

                                             2015                         

[facility]                    

 

                    multivitamin                         1 tab, PO, Daily       

                    

                      Active                                                    

                          2015                         [facility]

nter

                   

 

                          ferrous sulfate 325 MG Oral Tablet                    

     325 mg = 1 tab, PO, 

Daily                                                   Active                  

 

                                                                      2015

               

                                        [facility]                 

   

 

                          lisinopril 2.5 mg oral tablet                         

2.5 mg = 1 tab, PO, Daily,

# 30 tab                                                   Active               

 

                                                                      2015

            

                                        [facility]                 

   

 

                          NovoLIN 70/30                         20 - 30 units, S

UB-Q, TID-Before Meals    

                                             Active                             

                                             2015                         

[facility]                    

 

                          ceFAZolin (SCIP) + Sodium Chloride 0.9%  mL     

                    Notes:

(Same As: Ancef, Kefzol)  Cefazolin **FOR IV SET ONLY**   *** MEDICATION WASTE 
*** Product Size:  1000 mg Product Wasted:  ___ mg                              

                          No Longer Active                                      

       

                                             2015                         

[facility]                    

 

                          Ofirmev                         Notes: Infuse over 15 

minutes  Do not exceed 

4gm/day of acetaminophen    *** MEDICATION WASTE *** Product Size:  1000 mg 
Product Wasted:  _0__ mg                                                   No 

Longer Active                                                                   

 

                          2015                         [facility]

nter               

    

 

                          ketOROLAC 30 mg/mL injectable solution                

          4 days   *** 

MEDICATION WASTE *** Product Size:  30 mg Product Wasted:  __0_ mg              
                                             No Longer Active                   

          

                                             2015                         

[facility]                    

 

                          Docusate Sodium 100 MG Oral Capsule [Colace]          

               Notes: 

(Same as: Colace) (Do Not Crush)                                                

                    No Longer Active                                            

                  

                          2015                         [facility]

nter         

           

 

                          benzocaine-menthol topical                         Not

es: Cepacol lozenges  

Dispense 1 box = 16 lozenges  (Same As: Cepacol Lozenges)                       
                                             No Longer Active                   

                  

                                             2015                         

[facility]                    

 

                          Ondansetron                         Notes: (Same as: JEFE berry)   *** MEDICATION 

WASTE *** Product Size:  4 mg Product Wasted:  ___ mg                           

                          Inactive                                              

   

                                             2015                         

[facility]                    

 

                          Naloxone                         Notes: (Same as: Narc

an)                       

                                             Inactive                           

                  

                                             2015                         

[facility]                    

 

                          Flumazenil                         Notes: (Same as: Ro

mazicon)                  

                                             Inactive                           

             

                                             2015                         

[facility]                    

 

                          Meperidine                         Notes: (Same as: Kevin thompson)  "Use Precaution in

 Elderly, Seizure disorders, and Renal impairment"                              

                    Inactive                                                    

                          2015                         [facility]                    

 

                          Hydromorphone                         Notes: Same as: 

Dilaudid                  

                                             Inactive                           

             

                                             2015                         

[facility]                    

 

                          Labetalol                         10 mg, 2 mL, Route: 

IVP, Drug form: INJ, 

Q5Min, Dosing Weight 88.636, kg, PRN Elevated BP, Start date: 04/16/15 13:56:00,
 Duration: 5 doses or times, Stop date: Limited # of times                      
                                             Inactive                           

                 

                                             2015                         

[facility]                    

 

                          Insulin regular                           60 units)  S

table for 28 days at room 

temperature Expires in ______ days from ______________Date                      
                                             No Longer Active                   

                 

                                             2015                         

[facility]                    

 

                          Dextrose 50% Syringe                         12.5 gm, 

25 mL, Route: IVP, Drug 

Form: INJ, Dosing Weight 88.636, kg, PRN, PRN Blood Glucose Results, Start date:
 04/16/15 13:20:00, Duration: 30 day, Stop date: 05/16/15 13:19:00              
                                             No Longer Active                   

         

                                                    2015                  

     

                                        [facility]                 

   

 

                          Glucagon                         1 mg, Route: IM, Drug

 form: PDR/INJ, PRN, 

Dosing Weight 88.636, kg, PRN Blood Glucose Results, Start date: 04/16/15 
13:20:00, Duration: 30 day, Stop date: 05/16/15 13:19:00                        
                                             No Longer Active                   

                   

                                             2015                         

[facility]                    

 

                          Dilaudid                         Notes: Same as: Dilau

did                       

                                             No Longer Active                   

                  

                                             2015                         

[facility]                    

 

                          Dilaudid                         Notes: Same as: Dilau

did                       

                                             No Longer Active                   

                  

                                             2015                         

[facility]                    

 

                          Oxycodone Hydrochloride 5 MG Oral Tablet              

           Notes: (Same 

as: Roxicodone)                                                   No Longer 

Active                                                                          

 

                          2015                         [facility]

nter                    

 

                          Zofran                         Notes: (Same as: Zofran

)   *** MEDICATION WASTE 

*** Product Size:  4 mg Product Wasted:  ___ mg                                 

                          No Longer Active                                      

         

                                             2015                         

[facility]                    

 

                          normal saline 0.9% IV 1,000 mL                        

 1,000 mL, Rate: 75 ml/hr,

 Infuse over: 13.3 hr, Route: IV, Dosing Weight 88.636 kg, Total Volume: 1,000, 
Start date: 04/16/15 13:15:00, Duration: 30 day, Stop date: 05/16/15 13:14:00   
                                                    No Longer Active            

     

                                                                      2015

            

                                        [facility]                 

   

 

                          Phenergan                         Notes: Do not give I

V push.  (Same as: 

Phenergan)                                                   No Longer Active   

 

                                                                       

2015                              [facility]                 

   

 

                    Ambien                         Notes: (Same As: Ambien)     

                    

                          No Longer Active                                      

 

                                             2015                         

[facility]                    

 

                          Cepacol Lozenge                         1 lozenge, Rou

te: MUCOUS MEM, Q2H, Drug 

form: ALEXIS, PRN Sore Throat, Start date: 04/16/15 13:15:00, Duration: 30 day, 
Stop date: 05/16/15 13:14:00                                                   

Inactive                                                                        

 

                          2015                         [facility]

nter                   

 

 

                          Ancef                         2 gm, Route: IVPB, Drug 

form: INJ, ONCE, Dosing 

Weight 88.636, kg, Start date: 04/16/15 13:12:00, Duration: 1 doses or times, 
Stop date: 04/16/15 13:12:00                                                   

Inactive                                                                        

 

                          2015                         [facility]

nter                   

 

 

                          ceFAZolin                         2 gm, 50 mL, Route: 

IVPB, Drug form: INJ, PRE 

OP, Start date: 04/16/15 7:00:00, Duration: 1 day, Stop date: 04/17/15 6:59:00  
                                                    No Longer Active            

    

                                                                      2015

           

                                        [facility]                 

   

 

                          NovoLIN 70/30                         SUB-Q, TID-Befor

e Meals, 0 Refill(s)      

                                                    No Longer Active            

        

                                                                      2015

               

                                        [facility]                 

   

 

                                        Acetaminophen 325 MG / Hydrocodone Yesenia

trate 10 MG Oral Tablet [Norco 10/325]  

                                        1-2 tab, PO, Q4-6H, PRN Pain, # 30 tab, 

0 Refill(s)       

                                             Active                             

   

                                             2015                         

[facility]                    

 

                          Flexeril                         5 mg, PO, TID, PRN Mu

scle Spasm, # 30 tab, 0 

Refill(s)                                                   Active              

 

                                                                      2015

           

                                        [facility]                 

   



                                                                                
                                                                                
                                                                                
                                                                                
                                                                                
                                                                                
                                                                                
                                                                                
                                                                                
                                                                                
                                                                                
                                                                                
                                                                                
                                                                                
                                                                                
                                                                                
                                                                                
                                                                                
                                                                                
                                                                                
                                                                                
                                               



Allergies, Adverse Reactions, Alerts

                    



                    Substance                         Category                  

       Reaction     

                          Severity                         Reaction type        

       

                    Status                         Date Reported                

         

Comments                                Source                    

 

                          No Known Medication Allergies                         

Assertion                 

                                                                      Drug aller

gy           

                                                                                

       

                                         [facility]                    



                                                        



Immunizations

                    



                    Immunization                         Date Given             

            Site    

                          Status                         Last Updated           

      

                          Comments                         Source               

     

 

                          pneumococcal 23-valent vaccine                        

 2017               

                    Left deltoid                         completed              

           

[place]                                                   [facility], [facility]                    



                                            



Results





                    Order Name                         Results                  

       Value        

                          Reference Range                         Date          

          

                    Interpretation                         Comments             

            

Source                    

 

                CHEM PANEL                         Calcium Lvl     6.0          

               8.5 -

10.5                         2017                                         

                                        Result Comment: Critical Result(s) cabral

d to Suzanneapril Ruth at 2017 

06:05 byJw.  Read back OK.                         [facility]      

             

 

                CHEM PANEL                         eGFR            137          

                           

                    2017                                                  

Result 

Comment: The eGFR is calculated using the CKD-EPI formula. In most young, 
healthy individuals the eGFR will be >90 mL/min/1.73m2. The eGFR declines with 
age. An eGFR of 60-89 may be normal in some populations, particularly the 
elderly, for whom the CKD-EPI formula has not been extensively validated. Use of
the eGFR is not recommended in the following populations:<br/><br/>Individuals 
with unstable creatinine concentrations, including pregnant patients and those 
with serious co-morbid conditions.<br/><br/>Patients with extremes in muscle 
mass or diet. <br/><br/>The data above are obtained from the National Kidney 
Disease Education Program (NKDEP) which additionally recommends that when the 
eGFR is used in patients with extremes of body mass index for purposes of drug 
dosing, the eGFR should be multiplied by the estimated BMI.                     
                                        [facility]                 

   

 

                CHEM PANEL                         BUN             12           

              7 - 22        

                    2017                                                  

    

                                        [facility]                 

   

 

                CHEM PANEL                         Glucose Lvl     205          

               70 - 

99                         2017                                           

                                                    [facility]     

               

 

                CHEM PANEL                         Creatinine Lvl  0.43         

                

0.50 - 1.40                         2017                                  

                                                    [facility]     

            

  

 

                CHEM PANEL                         Sodium Lvl      145          

               135 - 

145                         2017                                          

                                                    [facility]     

               

 

                CHEM PANEL                         Potassium Lvl   3.7          

               3.5

- 5.1                         2017                                        

                                                    [facility]     

               

 

                CHEM PANEL                         Chloride Lvl    118          

               95 -

109                         2017                                          

                                                    [facility]     

               

 

                CHEM PANEL                         CO2             21           

              24 - 32       

                    2017                                                  

   

                                        [facility]                 

   

 

                CHEM PANEL                         AGAP            9.7          

               10.0 - 20.0 

                          2017                                            

   

                                                    [facility]     

               

 

                HEMATOLOGY                         Hct             23.8         

                42.0 - 54.0 

                          2017                                            

   

                                                    [facility]     

               

 

                HEMATOLOGY                         Hgb             8.2          

               14.0 - 18.0  

                          2017                                            

    

                                                    [facility]     

               

 

                LIPIDS                         VLDL            17               

                           

                    2017                                                  

             

                                        [facility]                 

   

 

                LIPIDS                         HDL             35               

          >=61 mg/dL        

                    2017                                                  

    

                                        [facility]                 

   

 

                LIPIDS                         Trig            86               

          <=149 mg/dL      

                    2017                                                  

  

                                        [facility]                 

   

 

                LIPIDS                         Chol            118              

           <=199 mg/dL     

                    2017                                                  

 

                                        [facility]                 

   

 

                LIPIDS                         CHD Risk        3.37             

            4.00 - 7.30

                          2017                                            

  

                                                    [facility]     

               

 

                LIPIDS                         LDL (Calculated) 66              

           <=99 

mg/dL                         2017                                        

                                                    [facility]     

               

 

                CHEM PANEL                         Magnesium Lvl   2.1          

               1.8

- 2.4                         2017                                        

                                                    [facility]     

               

 

                ELECTROLYTES                         AGAP            10.1       

                  10.0 - 

20.0                         2017                                         

                                                    [facility]     

               

 

                ELECTROLYTES                         eGFR            109        

                           

                    2017                                                  

Result

Comment: The eGFR is calculated using the CKD-EPI formula. In most young, 
healthy individuals the eGFR will be >90 mL/min/1.73m2. The eGFR declines with 
age. An eGFR of 60-89 may be normal in some populations, particularly the 
elderly, for whom the CKD-EPI formula has not been extensively validated. Use of
the eGFR is not recommended in the following populations:<br/><br/>Individuals 
with unstable creatinine concentrations, including pregnant patients and those 
with serious co-morbid conditions.<br/><br/>Patients with extremes in muscle 
mass or diet. <br/><br/>The data above are obtained from the National Kidney 
Disease Education Program (NKDEP) which additionally recommends that when the 
eGFR is used in patients with extremes of body mass index for purposes of drug 
dosing, the eGFR should be multiplied by the estimated BMI.                     
                                        [facility]                 

   

 

                ELECTROLYTES                         Calcium Lvl     8.8        

                 8.5

- 10.5                         2017                                       

                                                    [facility]     

               

 

                ELECTROLYTES                         Glucose Lvl     95         

                70 -

99                         2017                                           

                                                    [facility]     

               

 

                ELECTROLYTES                         Potassium Lvl   4.1        

                 

3.5 - 5.1                         2017                                    

                                                    [facility]     

              



 

                ELECTROLYTES                         CO2             31         

                24 - 32     

                    2017                                                  

 

                                        [facility]                 

   

 

                ELECTROLYTES                         Chloride Lvl    101        

                 95

- 109                         2017                                        

                                                    [facility]     

               

 

                ELECTROLYTES                         BUN             13         

                7 - 22      

                    2017                                                  

  

                                        [facility]                 

   

 

                ELECTROLYTES                         Sodium Lvl      138        

                 135 

- 145                         2017                                        

                                                    [facility]     

               

 

                    ELECTROLYTES                         Creatinine Lvl      0.7

6                        

                    0.50 - 1.40                         2017              

                    

                                                    [facility]     

            

  

 

                HEMATOLOGY                         Lymphocytes     35.1         

                20.0

- 40.0                         2017                                       

                                                    [facility]     

               

 

                HEMATOLOGY                         Monocytes       7.0          

               2.0 - 

12.0                         2017                                         

                                                    [facility]     

               

 

                HEMATOLOGY                         Eosinophils     1.6          

               0.0 -

4.0                         2017                                          

                                                    [facility]     

               

 

                HEMATOLOGY                         Eosinophils #   0.1          

               0.0

- 0.5                         2017                                        

                                                    [facility]     

               

 

                HEMATOLOGY                         Monocytes #     0.5          

               0.0 -

0.8                         2017                                          

                                                    [facility]     

               

 

                HEMATOLOGY                         Basophils       0.6          

               0.0 - 

1.0                         2017                                          

                                                    [facility]     

               

 

                HEMATOLOGY                         Segs-Bands #    3.6          

               1.5 

- 8.1                         2017                                        

                                                    [facility]     

               

 

                HEMATOLOGY                         Lymphocytes #   2.3          

               1.0

- 5.5                         2017                                        

                                                    [facility]     

               

 

                HEMATOLOGY                         Segs            55.7         

                45.0 - 75.0

                          2017                                            

  

                                                    [facility]     

               

 

                HEMATOLOGY                         MPV             9.2          

               7.4 - 10.4   

                          2017                                            

     

                                                    [facility]     

               

 

                HEMATOLOGY                         RDW             13.4         

                11.5 - 14.5 

                          2017                                            

   

                                                    [facility]     

               

 

                HEMATOLOGY                         Platelet        205          

               133 - 

450                         2017                                          

                                                    [facility]     

               

 

                HEMATOLOGY                         MCH             30.4         

                27.0 - 31.0 

                          2017                                            

   

                                                    [facility]     

               

 

                HEMATOLOGY                         MCHC            34.3         

                32.0 - 36.0

                          2017                                            

  

                                                    [facility]     

               

 

                HEMATOLOGY                         Hct             38.3         

                42.0 - 54.0 

                          2017                                            

   

                                                    [facility]     

               

 

                HEMATOLOGY                         MCV             88.6         

                80.0 - 94.0 

                          2017                                            

   

                                                    [facility]     

               

 

                HEMATOLOGY                         Hgb             13.1         

                14.0 - 18.0 

                          2017                                            

   

                                                    [facility]     

               

 

                HEMATOLOGY                         RBC             4.32         

                4.70 - 6.10 

                          2017                                            

   

                                                    [facility]     

               

 

                HEMATOLOGY                         WBC             6.5          

               3.7 - 10.4   

                          2017                                            

     

                                                    [facility]     

               

 

                SPECIAL CHEMISTRY                         Hgb A1C         9.4   

                      

<=5.6 %                         2017                                      

                                                    [facility]     

               

 

                    BLOOD BANK RESULTS                         Antibody Scrn    

   Negative 

                    (4/16/15 12:00 PM)                                          

        2015  

                                                                        [facility]                    

 

                BLOOD BANK RESULTS                         ABO/Rh          O POS

                         

                          2015                                            

  

                                                    [facility]     

               

 

                CHEM PANEL                         eGFR            115          

                           

                    2015                                                  

<sup>1</sup>Result Comment: The eGFR is calculated using the CKD-EPI formula. In
most young, healthy individuals the eGFR will be >90 mL/min/1.73m2. The eGFR 
declines with age. An eGFR of 60-89 may be normal in some populations, 
particularly the elderly, for whom the CKD-EPI formula has not been extensively 
validated. Use of the eGFR is not recommended in the following populations:&
lt;br/><br/>Individuals with unstable creatinine concentrations, including 
pregnant patients and those with serious co-morbid conditions.<br/><br/>Patients
with extremes in muscle mass or diet. <br/><br/>The data above are obtained from
the National Kidney Disease Education Program (NKDEP) which additionally 
recommends that when the eGFR is used in patients with extremes of body mass 
index for purposes of drug dosing, the eGFR should be multiplied by the 
estimated BMI.                          [facility]                 

 

 

 

                CHEM PANEL                         Chloride Lvl    101          

               95 -

109                         2015                                          

                                                    [facility]     

               

 

                CHEM PANEL                         Calcium Lvl     9.2          

               8.5 -

10.5                         2015                                         

                                                    [facility]     

               

 

                CHEM PANEL                         CO2             30           

              24 - 32       

                    2015                                                  

   

                                        [facility]                 

   

 

                CHEM PANEL                         Potassium Lvl   3.9          

               3.5

- 5.1                         2015                                        

                                                    [facility]     

               

 

                CHEM PANEL                         BUN             15           

              7 - 22        

                    2015                                                  

    

                                        [facility]                 

   

 

                CHEM PANEL                         Creatinine Lvl  0.7          

               

0.5 - 1.4                         2015                                    

                                                    [facility]     

              



 

                CHEM PANEL                         Sodium Lvl      139          

               135 - 

145                         2015                                          

                                                    [facility]     

               

 

                CHEM PANEL                         Glucose Lvl     146          

               70 - 

99                         2015                                           

                                        <sup>2</sup>Interpretive Data: Adult ref

erence range values reflect the 

clinical guidelines<br/>of the [employer].                   
                                        [facility]                 

   

 

                CHEM PANEL                         AGAP            11.9         

                10.0 - 20.0

                          2015                                            

  

                                                    [facility]     

               

 

                HEMATOLOGY                         Monocytes #     0.6          

               0.0 -

0.8                         2015                                          

                                                    [facility]     

               

 

                HEMATOLOGY                         Eosinophils #   0.2          

               0.0

- 0.5                         2015                                        

                                                    [facility]     

               

 

                HEMATOLOGY                         Lymphocytes #   2.2          

               1.0

- 5.5                         2015                                        

                                                    [facility]     

               

 

                HEMATOLOGY                         Eosinophils     2.2          

               0.0 -

4.0                         2015                                          

                                                    [facility]     

               

 

                HEMATOLOGY                         Segs-Bands #    5.1          

               1.5 

- 8.1                         2015                                        

                                                    [facility]     

               

 

                HEMATOLOGY                         Segs            62.7         

                45.0 - 75.0

                          2015                                            

  

                                                    [facility]     

               

 

                HEMATOLOGY                         Lymphocytes     27.4         

                20.0

- 40.0                         2015                                       

                                                    [facility]     

               

 

                HEMATOLOGY                         Monocytes       7.3          

               2.0 - 

12.0                         2015                                         

                                                    [facility]     

               

 

                HEMATOLOGY                         Basophils       0.4          

               0.0 - 

1.0                         2015                                          

                                                    [facility]     

               

 

                HEMATOLOGY                         MPV             9.8          

               7.4 - 10.4   

                          2015                                            

     

                                                    [facility]     

               

 

                HEMATOLOGY                         RBC             4.54         

                4.70 - 6.10 

                          2015                                            

   

                                                    [facility]     

               

 

                HEMATOLOGY                         WBC             8.1          

               3.7 - 10.4   

                          2015                                            

     

                                                    [facility]     

               

 

                HEMATOLOGY                         Hgb             14.1         

                14.0 - 18.0 

                          2015                                            

   

                                                    [facility]     

               

 

                HEMATOLOGY                         Hct             40.7         

                42.0 - 54.0 

                          2015                                            

   

                                                    [facility]     

               

 

                HEMATOLOGY                         MCV             89.7         

                80.0 - 94.0 

                          2015                                            

   

                                                    [facility]     

               

 

                HEMATOLOGY                         Platelet        142          

               133 - 

450                         2015                                          

                                                    [facility]     

               

 

                HEMATOLOGY                         MCHC            34.5         

                32.0 - 36.0

                          2015                                            

  

                                                    [facility]     

               

 

                HEMATOLOGY                         RDW             12.8         

                11.5 - 14.5 

                          2015                                            

   

                                                    [facility]     

               

 

                HEMATOLOGY                         MCH             31.0         

                27.0 - 31.0 

                          2015                                            

   

                                                    [facility]     

               

 

                HEMATOLOGY                         INR             1.02         

                0.85 - 1.17 

                          2015                                            

   

                                        <sup>3</sup>Interpretive Data: RECOMMEND

ED RANGES FOR PROTIME INR:<br/>   2.0-

3.0 for most medical and surgical thromboembolic states.<br/>   2.5-3.5 for 
artificial heart valves and recurrent embolism.<br/><br/>INR SHOULD BE USED ONLY
FOR PATIENTS ON STABLE ANTICOAGULANT THERAPY.                         [facility]                    

 

                HEMATOLOGY                         PTT             37.1         

                22.9 - 35.8 

                          2015                                            

   

                                        <sup>4</sup>Interpretive Data: Heparin T

herapeutic Range:  57 - 92 Seconds    

                                        [facility]                 

   

 

                HEMATOLOGY                         PT              13.4         

                12.0 - 14.7  

                          2015                                            

    

                                                    [facility]     

               



                                                                                
                                                                                
                                                                                
                                                                                
                                                                                
                                                                                
                                                                                
                                                                                
                                                                                
                              



Pathology Reports





                                        No Data Provided for This Section       

             



                                                



Diagnostic Reports

                    



                    Report                         Value                        

 Date               

                                        Source                    

 

                          Abdomen AP DX                         EXAM: Abdomen AP

 DX

DATE: 2019 14:10 CDT.

INDICATION: N20.0 Calculus of kidney.

COMPARISON: None available.

TECHNIQUE: AP view of the abdomen. A total of 2 images were obtained.

FINDINGS:

Lines, Tubes and Hardware: Multiple surgical clips project over the left upper 
quadrant.

Lower Thorax: Unremarkable where visible.

Abdomen and Bowel: Nonobstructive bowel gas pattern. A moderate to large amount 
of formed stool is seen from the right colon to the rectum.

Calcifications: There is a questionable ovoid density which projects at the 
expected level of the lower pole of the right kidney. It measures approximately 
1.4 cm craniocaudal.

Bones and Soft Tissues: No acute abnormality. 

IMPRESSION:

                                        1.  1.4 cm calcification projects at the

 expected level of the lower pole of the

RIGHT kidney. The exact location is unclear. This could be within the transverse
colon at the hepatic flexure and could represent ingested, calcified material. 
Alternatively, this could represent a renal stone. Consider further evaluation 
with a renal US and/or renal stone CT.

                                        2.  Nonobstructive bowel gas pattern.

                                        3.  Moderate to large stool burden.

                          2019                          GABRIELA Restrepo   

 

               



                                                                    



Consultation Notes

                    



                                        No Data Provided for This Section       

             



                                                            



Discharge Summaries

                    



                                        No Data Provided for This Section       

             



                                                            



History and Physicals

                    



                                        No Data Provided for This Section       

             



                                                                



Vital Signs

                     



                    Vital Sign                         Value                    

     Date           

                          Comments                         Source               

     

 

                    Temperature Oral (F)                         98.0 F         

                

2017                                                   [facility]                    

 

                    Respitory Rate                         17                   

       2017   

                                                    [facility]     

      

        

 

                    Heart Rate                         86                       

   2017       

                                                    [facility]     

          

    

 

                    Systolic (mm Hg)                         108                

          2017

                                                    [facility]     

   

           

 

                    Diastolic (mm Hg)                         64                

          2017

                                                    [facility]     

   

           

 

                    Temperature Oral (F)                         98.1 F         

                

2017                                                   [facility]                    

 

                    Heart Rate                         109                      

    2017      

                                                    [facility]     

         

     

 

                    Temperature Oral (F)                         98.3 F         

                

2017                                                   [facility]                    

 

                    Systolic (mm Hg)                         168                

          2017

                                                    [facility]     

   

           

 

                    Diastolic (mm Hg)                         77                

          2017

                                                    [facility]     

   

           

 

                    Respitory Rate                         18                   

       2017   

                                                    [facility]     

      

        

 

                    Heart Rate                         84                       

   2017       

                                                    [facility]     

          

    

 

                    Systolic (mm Hg)                         143                

          2017

                                                    [facility]     

   

           

 

                    Diastolic (mm Hg)                         78                

          2017

                                                    [facility]     

   

           

 

                    Respitory Rate                         17                   

       2017   

                                                    [facility]     

      

        

 

                    Height                         177.8 cm                     

    2017      

                                                    [facility]     

         

     

 

                    Weight                         97.727                       

   2017       

                                                    [facility]     

          

    

 

                    BMI Calculated                         30.91                

          2017

                                                    [facility]     

   

           

 

                    BMI Calculated                         31.06                

          2017

                                                    [facility]     

   

           

 

                    Weight                         98.182                       

   2017       

                                                    [facility]     

          

    

 

                    Height                         177.8 cm                     

    2017      

                                                    [facility]     

         

     

 

                    Respitory Rate                         20                   

       2015   

                                                    [facility]     

      

        

 

                    Heart Rate                         76                       

   2015       

                                                    [facility]     

          

    

 

                    Temperature Oral (F)                         98.1 F         

                

2015                                                   [facility]                    

 

                    Systolic (mm Hg)                         105                

          2015

                                                    [facility]     

   

           

 

                    Diastolic (mm Hg)                         67                

          2015

                                                    [facility]     

   

           

 

                    Respitory Rate                         18                   

       2015   

                                                    [facility]     

      

        

 

                    Heart Rate                         77                       

   2015       

                                                    [facility]     

          

    

 

                    Temperature Oral (F)                         98.4 F         

                

2015                                                   [facility]                    

 

                    Systolic (mm Hg)                         105                

          2015

                                                    [facility]     

   

           

 

                    Diastolic (mm Hg)                         68                

          2015

                                                    [facility]     

   

           

 

                    Respitory Rate                         18                   

       2015   

                                                    [facility]     

      

        

 

                    Temperature Oral (F)                         98.2 F         

                

2015                                                   [facility]                    

 

                    Systolic (mm Hg)                         123                

          2015

                                                    [facility]     

   

           

 

                    Diastolic (mm Hg)                         72                

          2015

                                                    [facility]     

   

           

 

                    Heart Rate                         89                       

   2015       

                                                    [facility]     

          

    

 

                    Weight                         88.636                       

   2015       

                                                    [facility]     

          

    

 

                    BMI Calculated                         28.04                

          2015

                                                    [facility]     

   

           

 

                    Weight                         88.636                       

   2015       

                                                    [facility]     

          

    

 

                    Height                         177.8 cm                     

    2015      

                                                    [facility]     

         

     

 

                    Weight                         90.909                       

   2015       

                                                    [facility]     

          

    

 

                    BMI Calculated                         28.76                

          2015

                                                    [facility]     

   

           

 

                    Height                         177.8 cm                     

    2015      

                                                    [facility]     

         

     



                                                                                
                                                                                
                                                                                
                                                                                
                                                                                
                                                                                
                                                                                
                                                                                
                                                                                
                               



Encounters

                    



                    Location                         Location Details           

              

Encounter Type                         Encounter Number                         

Reason For Visit                         Attending Provider                     

                    ADM Date                         DC Date                    

     Status      

                                        Source                    

 

                    [facility]                                   

               

Inpatient                         [id number]                                  

                          Jose Watt                          2015                                                  

[facility]                                   

               

Bedded Outpatient                         [id number]                          

                          Jose Watt                          2017                                                  

[facility]                    

                          

                          Outpt Diag Services                         [id number]

00                     

                                             Beatrice Eubanks                     

     2019                                            

    

                                         [facility]                    



                                                                                
               



Procedures

                    



                    Procedure                         Code                      

   Date             

                    Perfomer                         Comments                   

      

Source                    

 

                    Gastric bypass                         [id number]            

                    

                                                                      [facility]                    

 

                    Operation                         [id number]                 

                    

                                                                      [facility]                    

 

                          Tonsillectomy and adenoidectomy                       

  [id number]                

                                                                                

            

                                        [facility]

                    



                                                                                
               



Assessment and Plan

                    



                    Assessment and Plan                         Date            

             Source 

                  

 

                                        Extracted from:Title: Clinical Document

Author: Matt Valdes PA-C

Date: 17

Discharge Summary



Patient Name: Shanna Crump

FIN: [id number]

Admission Date: 17

Discharge Date: 17

Attending Physician: Jose Watt DO/NAYA

Diagnosis:  cervical spinal stenosis

HPI/Hospital Course: Patient admitted for an elective posterior cervical 
decompression.  Surgery was without complication.  Pain management and 
hospitalist followed patient throughout admission.  POD#1 he met all discharge 
criteria and was discharged to home.

Procedures: 17- Dr. Watt- bilateral posterior C3-C7 decompression

Discharge Medications: see med rec

Discharge Condition: good

Patient/Family Discharge Instructions: discharged to home on carb controlled 
diet with written activity and wound care instructions.

Follow up Appointments: Dr. Watt- 2 weeks



Extracted from:Title: Clinical Document

Author: Matt Valdes PA-C

Date: 17

NSERVIN

POD#1 posterior cervical decompression

doing well.  pain controlled.  ready for discharge.

strength 5/5 throughout

incision c/d/i

d/c drain

d/c home



Extracted from:Title: [facility] Consult Note

Author: Carroll Hodgson DO

Date: 17



Patient:   SHANNA CRUMP JR            MRN: [id number]            FIN: 
[id number]

Age:   47 years     Sex:  Male     :  1970

Associated Diagnoses:   None

Author:   Carroll Hodgson DO

Basic Information

Referral source:  Jose Watt DO.

Reason for consultation: Complex Acute Pain

Chief Complaint

post op pain control

History of Present Illness

          The patient presents with Location: cervical area

Quality: sharp, throbbing

Severity: 9/10

Timing: since surgery

Modifying factors: pain med with mild relief, moving makes it worse

 .

                                        46 yo M with pmhx of chronic cervical sp

inal stenosis from multiple previous 

traumas, chronic low back pain, DM, depression, NOEMI who received B/L C3-C7 
laminotomy on . [facility] has been consulted for post op acute pain control. The 
patient reports that the medications he has received post operatively in PACU 
have not been helping him to alleviate his pain. The pain is described as sharp 
and throbbing and graded 9/10. Due to his chronic spine pain, he takes oxycontin
40mg bid.



Histories

Past Medical History:

Active

Diabetes mellitus (189440589)

Back pain ([id number])

Cervical stenosis of spine ([id number])

Anxiety ([id number])

Depression ([id number])

NOEMI (obstructive sleep apnea) ([id number])

Family History:

Cancer

Grandparent

Lung

Mother

Type 2 diabetes mellitus

Grandparent

Procedure history:

Operation ([id number]).

Gastric bypass ([id number]).

Operation ([id number]).

Tonsillectomy and adenoidectomy ([id number]).

Social History

Social and Psychosocial Habits

Tobacco

                                        2017  Use: Never smoker

  Exposure to Tobacco Smoke None

  Cigarette Smoking Last 365 Days No

  Reg Smoking Cessation Counseling No

                                        .

Health Status

Allergies:

Allergic Reactions (Selected)

Severity Not Documented

NKDA- No reactions were documented.,

Allergies (1) Active        Reaction

NKDA        None Documented

Current medications:  (Selected)

Inpatient Medications

Ordered

Ambien: 10 mg, 2 tab, PO, Bedtime, PRN: Insomnia

Cepacol Sore Throat Cherry Sugar Free 15 mg-3.6 mg mucous membrane lozenge: 1 
lozenge, PO, Q2H, PRN: as needed for sore throat

Colace 100 mg oral capsule: 100 mg, 1 cap, PO, BID

Dextrose 50% Syringe: 12.5 gm, 25 mL, IVP, PRN, PRN: Blood Glucose Results

Dextrose 50% Syringe: 25 gm, 50 mL, IVP, PRN, PRN: Blood Glucose Results

Insulin regular: 10 unit, 0.1 mL, SUB-Q, TID-Before Meals, PRN: Blood Glucose 
Results

Insulin regular: 2 unit, 0.02 mL, SUB-Q, TID-Before Meals, PRN: Blood Glucose 
Results

Insulin regular: 4 unit, 0.04 mL, SUB-Q, TID-Before Meals, PRN: Blood Glucose 
Results

Insulin regular: 6 unit, 0.06 mL, SUB-Q, TID-Before Meals, PRN: Blood Glucose 
Results

Insulin regular: 8 unit, 0.08 mL, SUB-Q, TID-Before Meals, PRN: Blood Glucose 
Results

LORazepam: 1 mg, 1 tab, PO, PRN, PRN: as needed for anxiety

Phenergan: 12.5 mg, 0.5 mL, IVPB, Q4H, PRN: Nausea and Vomiting

Protonix: 40 mg, IVP, Daily

Zofran: 4 mg, 2 mL, IV, Q4H, PRN: Nausea

acetaminophen: 1,000 mg, 2 tab, PO, Q6Hnow

ceFAZolin (SCIP) + sodium chloride 0.9%  mL: 2 gm, 200 ml/hr, IVPB, 
ABXQ8H

dexamethasone: 4 mg, 1 mL, IVP, Q12H

diphenhydrAMINE: 25 mg, 1 cap, PO, TID, PRN: Itching

escitalopram: 20 mg, 2 tab, PO, Daily

gabapentin: 600 mg, 2 cap, PO, Q8Hnow

glucagon: 1 mg, IM, PRN, PRN: Blood Glucose Results

insulin isophane-insulin regular 70/30: 20 unit, 0.2 mL, SUB-Q, QAM and PM

ketOROLAC 30 mg/mL injectable solution: 15 mg, 1 mL, IV, Q6H

magnesium oxide: 400 mg, 1 tab, PO, Daily

metFORMIN 500 mg oral tablet: 500 mg, 1 tab, PO, BID

oxyCODONE 5 mg immediate release: 10 mg, 2 tab, PO, Q6H, PRN: Pain Score 7-10

oxyCODONE 5 mg immediate release: 5 mg, 1 tab, PO, Q6H, PRN: Pain Score 4-6

oxyCONTIN: 40 mg, 2 tab, PO, [id number]

pneumococcal 23-valent vaccine: 0.5 mL, IM, Daily

senna: 8.6 mg, 1 tab, PO, Daily, PRN: Constipation

sodium chloride 0.9% 1000 ml INJ 1,000 mL: 75 ml/hr, IV, Stop: 17 8:41:00 
CDT

tizanidine: 4 mg, 1 tab, PO, Q8H, PRN: as needed for muscle spasm

tramadol 50 mg oral tablet: 50 mg, 1 tab, PO, Q4H, PRN: Pain Score 1-3

Documented Medications

Suspended

Calcium 600 +D oral tablet: 1 tab, PO, Daily

LORazepam 1 mg oral tablet: 1 mg, 1 tab, PO, PRN

Norco 10/325 oral tablet: 1 tab, PO, TID, PRN: Pain, 0 Refill(s)

diclofenac topical 1% gel: TOP

escitalopram 20 mg oral tablet: 20 mg, 1 tab, PO, Daily

gabapentin 600 mg oral tablet: 600 mg, 1 tab, PO, BID

lidocaine 3% topical gel: 1 appl, TOP

metFORMIN 500 mg oral tablet: 500 mg, 1 tab, PO, BID

multivitamin: 1 tab, PO, Daily

oxyCONTIN 40 mg oral tablet, extended release: 40 mg, 1 tab, PO, Q12H

tizanidine 4 mg oral capsule: 4 mg, 1 cap, PO, TID, As needed,

Medications (33) Active

Scheduled: (13)

acetaminophen 500 mg TAB  1,000 mg 2 tab, PO, Q6Hnow

ceFAZolin 1 gm VL INJ + sodium chloride 0.9%  mL  2 gm, IVPB, ABXQ8H

dexamethasone 4mg/ml INJ  4 mg 1 mL, IVP, Q12H

docusate sodium 100 mg CAP  100 mg 1 cap, PO, BID

escitalopram 10 mg TAB  20 mg 2 tab, PO, Daily

gabapentin 300 mg CAP  600 mg 2 cap, PO, Q8Hnow

insulin NPH-REG (mix) 70/30 INJ 10ml  20 unit 0.2 mL, SUB-Q, QAM and PM

ketOROLAC 15 mg/1 ml INJ VL  15 mg 1 mL, IV, Q6H

magnesium oxide (242 mg elemental) tab  400 mg 1 tab, PO, Daily

metFORMIN 500 mg TAB  500 mg 1 tab, PO, BID

oxyCODONE 20 mg ERT  40 mg 2 tab, PO, [id number]

pantoprazole 40 mg INJ  40 mg, IVP, Daily

pneumococcal 23-valent vaccine  0.5 mL, IM, Daily

Continuous: (1)

sodium chloride 0.9% 1000 ml INJ 1,000 mL  1,000 mL, IV, 75 ml/hr

PRN: (19)

benzocaine-menthol 15 mg-3.6 mg Lozenges 16's  1 lozenge, PO, Q2H

Dextrose 50% 50 ml INJ syringe  12.5 gm 25 mL, IVP, PRN

Dextrose 50% 50 ml INJ syringe  25 gm 50 mL, IVP, PRN

diphenhydrAMINE 25 mg CAP  25 mg 1 cap, PO, TID

glucagon recombinant 1 mg PDR  1 mg, IM, PRN

insulin reg human rec 100 unit/ml INJ 3 ml Vial  2 unit 0.02 mL, SUB-Q, TID-
Before Meals

insulin reg human rec 100 unit/ml INJ 3 ml Vial  4 unit 0.04 mL, SUB-Q, TID-
Before Meals

insulin reg human rec 100 unit/ml INJ 3 ml Vial  6 unit 0.06 mL, SUB-Q, TID-
Before Meals

insulin reg human rec 100 unit/ml INJ 3 ml Vial  8 unit 0.08 mL, SUB-Q, TID-
Before Meals

insulin reg human rec 100 unit/ml INJ 3 ml Vial  10 unit 0.1 mL, SUB-Q, TID-
Before Meals

LORazepam 1 mg TAB  1 mg 1 tab, PO, PRN

ondansetron 4 mg/2ml INJ VL  4 mg 2 mL, IV, Q4H

oxyCODONE IR 5 mg TAB  5 mg 1 tab, PO, Q6H

oxyCODONE IR 5 mg TAB  10 mg 2 tab, PO, Q6H

promethazine 25 mg/1 ml INJ  12.5 mg 0.5 mL, IVPB, Q4H

senna 8.6 mg TAB  8.6 mg 1 tab, PO, Daily

tizanidine 4 mg TAB  4 mg 1 tab, PO, Q8H

traMADol 50 mg TAB  50 mg 1 tab, PO, Q4H

zolpidem 5 mg TAB  10 mg 2 tab, PO, Bedtime

Problem list:

Active Problems (7)

Anxiety 

Back pain 

Cervical stenosis of spine 

Depression 

Diabetes mellitus 

NOEMI (obstructive sleep apnea) 

Pain 



Review of Systems

Constitutional:  Negative.

Respiratory:  Negative.

Cardiovascular:  Negative.

Abdomen: Negative

Musculoskeletal: back pain, muscle pain

Neurologic:  Negative.

All other systems are negative



Physical Examination

VS/Measurements

Measurements from flowsheet : Measurements

                                        2017 07:18

Heparin Dosing Weight (kg)

                                        82.89

                                        2017 07:18

Height

                                        177.8 cm

Height Collection Method

Stated

Weight

                                        97.727 kg

Dosing Weight Difference Percent

                                        -0.463 %

Dosing Weight Collection Method

Estimated

Body Surface Area

                                        2.197 m2

Body Mass Index

                                        30.91 m2

,

Vital Signs (last 24 hrs)_____        Last Charted___________

Temp Oral        97.6 DegF  (:)

Heart Rate Peripheral        79 bpm  (:)

Resp Rate            16 BRMIN  ()

SBP        H 147mmHg  ()

DBP        87 mmHg  ()

SpO2        94 %  ()

Weight        97.727 kg  ()

Height        177.8 cm  ()

BMI        30.91  ()

PE

General:  Alert and oriented, moderate distress with pain

Respiratory:  Respirations are non-labored.

Cardiovascular:  RRR

Abdomen: soft, ND, NT

Integumentary:  Warm.

Neurologic:  Alert

Cognition and Speech:  Oriented.

Psychiatric:  Cooperative.



Review / Management

Results review:

Labs (Last four charted values)

WBC                     6.5    ()

Hgb                     L 13.1    ()

Hct                     L 38.3    ()

Plt                     205    ()

Na                      138    ()

K                       4.1    ()

CO2                     31    ()

Cl                      101    ()

Cr                      0.76    ()

BUN                     13    ()

Glucose Random          95    ()

Mg                      2.1    ()

Ca                      8.8    ()    .

Impression and Plan

Diagnosis

Orders

                                        46 yo M with pmhx of chronic cervical sp

inal stenosis from multiple previous 

traumas, chronic low back pain, DM, depression, NOEMI who received B/L C3-C7 
laminotomy on . [facility] has been consulted for post op acute pain control.

                                        - Resuming his home med of oxycontin 40m

g q12h

                                        - Adding oxycodone 5/10mg q6h prn for br

eak-through

                                        - Adding gabapentin 600mg q8h and 1g ace

taminophone q6h

                                        - Mg level at 2.1. adding Magnesium oxid

e 400mg qD for next 3 days

                                        - Thank you for the opportunity to parti

cipate in this patient's care. [facility] will

continue to follow

Education and Follow-up:       Counseled: Patient, Regarding treatment, 
Regarding medications.

Approximate patient care time: 45 min

Greater than 50% of this time was spent on counselling and coordination of care 
on the matters mentioned above in assessment and recommendations

Addendum by Toni Villalobos MD on 2017 13:37

TEACHING PHYSICIAN ADDENDUM: I saw and personally examined this patient and  
discussed the plan of care with this resident. I have reviewed the note below 
and agree with the history, examination findings and the plan of care.



Extracted from:Title: Surgical procedure note

Author: Jose Watt DO

Date: 17

Procedure:

                                        1) Bilateral C3-C4 laminotomy, partial f

acetectomy, with facet undercutting and 

C4 root foraminotomies (00589-41)

                                        2) Bilateral C4-C5 laminotomy, partial f

ectectomy, with facet undercutting and 

C5 root foraminotomies (34656-21)

                                        3) Bilateral C5-C6 laminotomy, partial f

acetectomy, with facet undercutting and 

C6 root foraminotomies (25852-63)

                                        4) Bilateral C6-C7 laminotomy, partial f

acetectomy, with facet undercutting and 

C7 root foraminotomies (76674-92)



Preop Diagnosis:

                                        1) Spinal stenosis, cervical region (M48

.02)

                                        2) Other cervical disc degeneration, hig

h cervical region (M50.31)

                                        3) Other cervical disc degeneration, C4-

C5 interspace level (M50.321)

                                        4) Other cervical disc degeneration, C5-

C6 interspace level (M50.322)

                                        5) Other cervical disc degeneration, C6-

C7 interspace level (M50.323)

                                        6) Other spondylosis with myelopathy, ce

rvical region  (M47.12)

Postop Diagnosis:

                                        1) Spinal stenosis, cervical region (M48

.02)

                                        2) Other cervical disc degeneration, hig

h cervical region (M50.31)

                                        3) Other cervical disc degeneration, C4-

C5 interspace level (M50.321)

                                        4) Other cervical disc degeneration, C5-

C6 interspace level (M50.322)

                                        5) Other cervical disc degeneration, C6-

C7 interspace level (M50.323)

                                        6) Other spondylosis with myelopathy, ce

rvical region  (M47.12)

Surgeon:

Jose Watt DO, FACOS

Assistant:

Matt Valdes PA-C

EBL:

                                        150 cc

Blood given:

None

Drain:

                                        10 French Hemovac drain placed

Complications:

None

Dosposition:

Recovery in stable condition

Procedure:

Following obtainment of informed consent the following procedure was performed. 
The patient was brought to the operating room on the transport cart in the 
supine position.  Smooth induction of anesthesia was achieved with adequate 
venous line access secured.  The patient was then turned prone upon a Alfie 
frame with the arms tucked and appropriate points padded.

                                        10 Minute Betadine soap solution scrub p

rep was carried out directed at the 

posterior cervical region.  Sterile draping was then applied.  A midline 
incision was made centered from C3 thru C7 posteriorly.  Elevation of the fascia
and musculature tissues off the osseus spine was carried out to expose the 
spinous processes and laminae of C3 thru C7 bilaterally.

Micro drill in combination with 1 mm., 2 mm., and 3 mm. Kerrison punches were 
used to create laminotomies beginning on the left side.  Facet undercutting 
along with expansion of the foramina using micro curette to complete 
foraminotomies were carried on.  Confirmation of root freedom was achieved using
micro nerve hook probe.  Hemostasis was achieved.

With this completed on the left side, in a similar fashion, laminotomies, 
partial facetectomies, and foraminotomies were achieved in sequence on the right
side.  Again, confirmation of root freedom was achieved on the right side as 
well.  Hemostasis was achieved and confirmed.

Copious amounts of antibiotic irrigation was used within the wound.  Hemostasis 
was confirmed.  A 10 French Hemovac Drain was placed deep within the wound and 
brought out through a separate stab point.

The wound was closed using 0 Neurolon on the deep fascial layers in an 
interrupted fashion.  2-0 Vicryl was used on the superficial fascial layers and 
staples used on the skin surface.

The final sponge and needles counts were noted to be correct times two.  There 
were no identified intraoperative anesthetic or surgical complications.

Jose Watt DO, FACOS

                          2017                         [facility]                    

 

                                        Extracted from:Title: POD #1

Author: Lourdes Hawthorne

Date: 4/17/15

Doing well, resting comfortably in bed. Pain minimal, well controlled. Up, 
ambulating. No HA, N/V, dizziness. Voiding, no BM.

A&Ox3

VSS, afebrile

Wound flat, no EED, steri strips intact, cleansed/redressed

Motor 4+/5 left ankle dorsiflexion, otherwise 5/5

Pedal pulses 2+ b/l

A/P

D/W Shukri

Elevated BGM overnight 400's - due to dextrose in abx fluid, corrected with 
sliding scale

Home today

Disussed activity/wound care

He has his postop RX

Lourdes Hawthorne PA-C

                          2015                         [facility]                    



                                                             



Plan of Care





                                        No Data Provided for This Section       

             



                                                                



Social History

                    



                    Social History                         Date                 

        Source      

             

 

                                        Social History TypeResponse

Smoking Status

Never smoker; Exposure to Tobacco Smoke None; Cigarette Smoking Last 365 Days 
No; Reg Smoking Cessation Counseling No

entered on: 2017                          [facility]   

 

               

 

                                        Social History TypeResponse

Smoking Status

Never smoker; Exposure to Tobacco Smoke None; Cigarette Smoking Last 365 Days 
No; Reg Smoking Cessation Counseling No

                          2017                         [facility]                    



                                                                                
   



Family History

                    



                                        No Data Provided for This Section       

             



                                                            



Advance Directives

                    



                                        No Data Provided for This Section       

             



                                                            



Functional Status

                    



                                        No Data Provided for This Section
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
## 2021-06-11 ENCOUNTER — HOSPITAL ENCOUNTER (EMERGENCY)
Dept: HOSPITAL 88 - ER | Age: 51
Discharge: HOME | End: 2021-06-11
Payer: COMMERCIAL

## 2021-06-11 VITALS — HEIGHT: 71 IN | BODY MASS INDEX: 28.7 KG/M2 | WEIGHT: 205 LBS

## 2021-06-11 VITALS — SYSTOLIC BLOOD PRESSURE: 135 MMHG | DIASTOLIC BLOOD PRESSURE: 73 MMHG

## 2021-06-11 DIAGNOSIS — G89.29: ICD-10-CM
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
## 2021-06-11 DIAGNOSIS — M54.9: ICD-10-CM

## 2021-06-11 DIAGNOSIS — S61.011A: Primary | ICD-10-CM

## 2021-06-11 DIAGNOSIS — Z98.84: ICD-10-CM

## 2021-06-11 DIAGNOSIS — E11.40: ICD-10-CM

## 2021-06-11 PROCEDURE — 73120 X-RAY EXAM OF HAND: CPT

## 2021-06-11 PROCEDURE — 90714 TD VACC NO PRESV 7 YRS+ IM: CPT

## 2021-06-11 PROCEDURE — 12042 INTMD RPR N-HF/GENIT2.6-7.5: CPT

## 2021-06-11 PROCEDURE — 99283 EMERGENCY DEPT VISIT LOW MDM: CPT

## 2021-06-11 PROCEDURE — 90471 IMMUNIZATION ADMIN: CPT

## 2021-10-27 ENCOUNTER — HOSPITAL ENCOUNTER (EMERGENCY)
Dept: HOSPITAL 88 - ER | Age: 51
Discharge: HOME | End: 2021-10-27
Payer: COMMERCIAL

## 2021-10-27 ENCOUNTER — HOSPITAL ENCOUNTER (EMERGENCY)
Dept: HOSPITAL 88 - ER | Age: 51
Discharge: LEFT BEFORE DISCHARGE | End: 2021-10-27
Payer: COMMERCIAL

## 2021-10-27 VITALS — SYSTOLIC BLOOD PRESSURE: 155 MMHG | DIASTOLIC BLOOD PRESSURE: 75 MMHG

## 2021-10-27 VITALS — HEIGHT: 71 IN | WEIGHT: 205 LBS | BODY MASS INDEX: 28.7 KG/M2

## 2021-10-27 DIAGNOSIS — J01.10: Primary | ICD-10-CM

## 2021-10-27 DIAGNOSIS — E11.40: ICD-10-CM

## 2021-10-27 DIAGNOSIS — H10.13: ICD-10-CM

## 2021-10-27 DIAGNOSIS — G47.00: ICD-10-CM

## 2021-10-27 DIAGNOSIS — R07.9: Primary | ICD-10-CM

## 2021-10-27 DIAGNOSIS — Z98.84: ICD-10-CM

## 2021-10-27 DIAGNOSIS — R06.02: ICD-10-CM

## 2021-10-27 LAB
ALBUMIN SERPL-MCNC: 3.9 G/DL (ref 3.5–5)
ALBUMIN/GLOB SERPL: 1.1 {RATIO} (ref 0.8–2)
ALP SERPL-CCNC: 60 IU/L (ref 40–150)
ALT SERPL-CCNC: 24 IU/L (ref 0–55)
ANION GAP SERPL CALC-SCNC: 15 MMOL/L (ref 8–16)
BASOPHILS # BLD AUTO: 0 10*3/UL (ref 0–0.1)
BASOPHILS NFR BLD AUTO: 0.4 % (ref 0–1)
BUN SERPL-MCNC: 17 MG/DL (ref 7–26)
BUN/CREAT SERPL: 22 (ref 6–25)
CALCIUM SERPL-MCNC: 9 MG/DL (ref 8.4–10.2)
CHLORIDE SERPL-SCNC: 105 MMOL/L (ref 98–107)
CO2 SERPL-SCNC: 23 MMOL/L (ref 22–29)
DEPRECATED NEUTROPHILS # BLD AUTO: 3.9 10*3/UL (ref 2.1–6.9)
EGFRCR SERPLBLD CKD-EPI 2021: 107 ML/MIN (ref 60–?)
EOSINOPHIL # BLD AUTO: 0.1 10*3/UL (ref 0–0.4)
EOSINOPHIL NFR BLD AUTO: 0.9 % (ref 0–6)
ERYTHROCYTE [DISTWIDTH] IN CORD BLOOD: 15.7 % (ref 11.7–14.4)
GLOBULIN PLAS-MCNC: 3.5 G/DL (ref 2.3–3.5)
GLUCOSE SERPLBLD-MCNC: 87 MG/DL (ref 74–118)
HCT VFR BLD AUTO: 37.1 % (ref 38.2–49.6)
HGB BLD-MCNC: 11.5 G/DL (ref 14–18)
LYMPHOCYTES # BLD: 2.2 10*3/UL (ref 1–3.2)
LYMPHOCYTES NFR BLD AUTO: 32.7 % (ref 18–39.1)
MCH RBC QN AUTO: 25.3 PG (ref 28–32)
MCHC RBC AUTO-ENTMCNC: 31 G/DL (ref 31–35)
MCV RBC AUTO: 81.5 FL (ref 81–99)
MONOCYTES # BLD AUTO: 0.5 10*3/UL (ref 0.2–0.8)
MONOCYTES NFR BLD AUTO: 6.7 % (ref 4.4–11.3)
NEUTS SEG NFR BLD AUTO: 58.9 % (ref 38.7–80)
PLATELET # BLD AUTO: 294 X10E3/UL (ref 140–360)
POTASSIUM SERPL-SCNC: 4 MMOL/L (ref 3.5–5.1)
RBC # BLD AUTO: 4.55 X10E6/UL (ref 4.3–5.7)
SODIUM SERPL-SCNC: 139 MMOL/L (ref 136–145)

## 2021-10-27 PROCEDURE — 36415 COLL VENOUS BLD VENIPUNCTURE: CPT

## 2021-10-27 PROCEDURE — 99282 EMERGENCY DEPT VISIT SF MDM: CPT

## 2021-10-27 PROCEDURE — 85025 COMPLETE CBC W/AUTO DIFF WBC: CPT

## 2021-10-27 PROCEDURE — 99284 EMERGENCY DEPT VISIT MOD MDM: CPT

## 2021-10-27 PROCEDURE — 93005 ELECTROCARDIOGRAM TRACING: CPT

## 2021-10-27 PROCEDURE — 71045 X-RAY EXAM CHEST 1 VIEW: CPT

## 2021-10-27 PROCEDURE — 80053 COMPREHEN METABOLIC PANEL: CPT

## 2021-10-27 PROCEDURE — 84484 ASSAY OF TROPONIN QUANT: CPT

## 2022-02-07 LAB
ANION GAP SERPL CALC-SCNC: 15.4 MMOL/L (ref 8–16)
BUN SERPL-MCNC: 9 MG/DL (ref 7–26)
BUN/CREAT SERPL: 10 (ref 6–25)
CALCIUM SERPL-MCNC: 9 MG/DL (ref 8.4–10.2)
CHLORIDE SERPL-SCNC: 101 MMOL/L (ref 98–107)
CO2 SERPL-SCNC: 24 MMOL/L (ref 22–29)
EGFRCR SERPLBLD CKD-EPI 2021: 94 ML/MIN (ref 60–?)
GLUCOSE SERPLBLD-MCNC: 320 MG/DL (ref 74–118)
POTASSIUM SERPL-SCNC: 4.4 MMOL/L (ref 3.5–5.1)
SODIUM SERPL-SCNC: 136 MMOL/L (ref 136–145)

## 2022-02-08 ENCOUNTER — HOSPITAL ENCOUNTER (OUTPATIENT)
Dept: HOSPITAL 88 - OR | Age: 52
Discharge: HOME | End: 2022-02-08
Attending: SPECIALIST
Payer: COMMERCIAL

## 2022-02-08 VITALS — DIASTOLIC BLOOD PRESSURE: 81 MMHG | SYSTOLIC BLOOD PRESSURE: 157 MMHG

## 2022-02-08 DIAGNOSIS — I10: ICD-10-CM

## 2022-02-08 DIAGNOSIS — Z79.4: ICD-10-CM

## 2022-02-08 DIAGNOSIS — M54.9: ICD-10-CM

## 2022-02-08 DIAGNOSIS — Z01.812: ICD-10-CM

## 2022-02-08 DIAGNOSIS — Z20.822: ICD-10-CM

## 2022-02-08 DIAGNOSIS — S62.635B: Primary | ICD-10-CM

## 2022-02-08 DIAGNOSIS — E11.9: ICD-10-CM

## 2022-02-08 DIAGNOSIS — K76.0: ICD-10-CM
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
## 2022-02-08 DIAGNOSIS — Z79.899: ICD-10-CM

## 2022-02-08 PROCEDURE — 80048 BASIC METABOLIC PNL TOTAL CA: CPT

## 2022-02-08 PROCEDURE — 36415 COLL VENOUS BLD VENIPUNCTURE: CPT

## 2022-02-08 PROCEDURE — 82948 REAGENT STRIP/BLOOD GLUCOSE: CPT

## 2022-02-08 PROCEDURE — 11011 DEBRIDE SKIN MUSC AT FX SITE: CPT

## 2022-02-08 PROCEDURE — 26756 PIN FINGER FRACTURE EACH: CPT

## 2022-02-08 PROCEDURE — 76000 FLUOROSCOPY <1 HR PHYS/QHP: CPT

## 2022-02-13 ENCOUNTER — HOSPITAL ENCOUNTER (EMERGENCY)
Dept: HOSPITAL 88 - ER | Age: 52
Discharge: HOME | End: 2022-02-13
Payer: COMMERCIAL

## 2022-02-13 VITALS — BODY MASS INDEX: 28.7 KG/M2 | WEIGHT: 205 LBS | HEIGHT: 71 IN

## 2022-02-13 VITALS — DIASTOLIC BLOOD PRESSURE: 80 MMHG | SYSTOLIC BLOOD PRESSURE: 135 MMHG

## 2022-02-13 DIAGNOSIS — R11.2: ICD-10-CM

## 2022-02-13 DIAGNOSIS — R53.1: ICD-10-CM

## 2022-02-13 DIAGNOSIS — E11.65: ICD-10-CM

## 2022-02-13 DIAGNOSIS — Z20.822: ICD-10-CM

## 2022-02-13 DIAGNOSIS — R19.7: Primary | ICD-10-CM

## 2022-02-13 DIAGNOSIS — G47.00: ICD-10-CM

## 2022-02-13 DIAGNOSIS — E11.40: ICD-10-CM

## 2022-02-13 LAB
ALBUMIN SERPL-MCNC: 3.5 G/DL (ref 3.5–5)
ALBUMIN/GLOB SERPL: 1.1 {RATIO} (ref 0.8–2)
ALP SERPL-CCNC: 74 IU/L (ref 40–150)
ALT SERPL-CCNC: 21 IU/L (ref 0–55)
AMPHETAMINES UR QL SCN>1000 NG/ML: NEGATIVE
ANION GAP SERPL CALC-SCNC: 18.5 MMOL/L (ref 8–16)
BACTERIA URNS QL MICRO: (no result) /HPF
BASOPHILS # BLD AUTO: 0.1 10*3/UL (ref 0–0.1)
BASOPHILS NFR BLD AUTO: 0.8 % (ref 0–1)
BENZODIAZ UR QL SCN: POSITIVE
BUN SERPL-MCNC: 10 MG/DL (ref 7–26)
BUN/CREAT SERPL: 10 (ref 6–25)
CALCIUM SERPL-MCNC: 8.9 MG/DL (ref 8.4–10.2)
CHLORIDE SERPL-SCNC: 101 MMOL/L (ref 98–107)
CLARITY UR: CLEAR
CO2 SERPL-SCNC: 23 MMOL/L (ref 22–29)
COLOR UR: YELLOW
DEPRECATED NEUTROPHILS # BLD AUTO: 3.3 10*3/UL (ref 2.1–6.9)
DEPRECATED RBC URNS MANUAL-ACNC: (no result) /HPF (ref 0–5)
EGFRCR SERPLBLD CKD-EPI 2021: 83 ML/MIN (ref 60–?)
EOSINOPHIL # BLD AUTO: 0.2 10*3/UL (ref 0–0.4)
EOSINOPHIL NFR BLD AUTO: 2.8 % (ref 0–6)
EPI CELLS URNS QL MICRO: (no result) /LPF
ERYTHROCYTE [DISTWIDTH] IN CORD BLOOD: 15.2 % (ref 11.7–14.4)
GLOBULIN PLAS-MCNC: 3.2 G/DL (ref 2.3–3.5)
GLUCOSE SERPLBLD-MCNC: 303 MG/DL (ref 74–118)
HCT VFR BLD AUTO: 37.1 % (ref 38.2–49.6)
HGB BLD-MCNC: 11.3 G/DL (ref 14–18)
KETONES UR QL STRIP.AUTO: NEGATIVE
LEUKOCYTE ESTERASE UR QL STRIP.AUTO: NEGATIVE
LIPASE SERPL-CCNC: 82 U/L (ref 8–78)
LYMPHOCYTES # BLD: 2.3 10*3/UL (ref 1–3.2)
LYMPHOCYTES NFR BLD AUTO: 36.1 % (ref 18–39.1)
MCH RBC QN AUTO: 25.5 PG (ref 28–32)
MCHC RBC AUTO-ENTMCNC: 30.5 G/DL (ref 31–35)
MCV RBC AUTO: 83.7 FL (ref 81–99)
MONOCYTES # BLD AUTO: 0.5 10*3/UL (ref 0.2–0.8)
MONOCYTES NFR BLD AUTO: 8.4 % (ref 4.4–11.3)
NEUTS SEG NFR BLD AUTO: 51.7 % (ref 38.7–80)
NITRITE UR QL STRIP.AUTO: NEGATIVE
PCP UR QL SCN: NEGATIVE
PLATELET # BLD AUTO: 309 X10E3/UL (ref 140–360)
POTASSIUM SERPL-SCNC: 3.5 MMOL/L (ref 3.5–5.1)
PROT UR QL STRIP.AUTO: NEGATIVE
RBC # BLD AUTO: 4.43 X10E6/UL (ref 4.3–5.7)
SODIUM SERPL-SCNC: 139 MMOL/L (ref 136–145)
SP GR UR STRIP: 1.01 (ref 1.01–1.02)
UROBILINOGEN UR STRIP-MCNC: 0.2 MG/DL (ref 0.2–1)
WBC #/AREA URNS HPF: (no result) /HPF (ref 0–5)

## 2022-02-13 PROCEDURE — 84484 ASSAY OF TROPONIN QUANT: CPT

## 2022-02-13 PROCEDURE — 72125 CT NECK SPINE W/O DYE: CPT

## 2022-02-13 PROCEDURE — 93005 ELECTROCARDIOGRAM TRACING: CPT

## 2022-02-13 PROCEDURE — 80307 DRUG TEST PRSMV CHEM ANLYZR: CPT

## 2022-02-13 PROCEDURE — 85025 COMPLETE CBC W/AUTO DIFF WBC: CPT

## 2022-02-13 PROCEDURE — 74177 CT ABD & PELVIS W/CONTRAST: CPT

## 2022-02-13 PROCEDURE — 81001 URINALYSIS AUTO W/SCOPE: CPT

## 2022-02-13 PROCEDURE — 99284 EMERGENCY DEPT VISIT MOD MDM: CPT

## 2022-02-13 PROCEDURE — 36415 COLL VENOUS BLD VENIPUNCTURE: CPT

## 2022-02-13 PROCEDURE — 80320 DRUG SCREEN QUANTALCOHOLS: CPT

## 2022-02-13 PROCEDURE — 80053 COMPREHEN METABOLIC PANEL: CPT

## 2022-02-13 PROCEDURE — 83690 ASSAY OF LIPASE: CPT

## 2022-02-13 PROCEDURE — 70450 CT HEAD/BRAIN W/O DYE: CPT
